# Patient Record
Sex: FEMALE | Race: BLACK OR AFRICAN AMERICAN | NOT HISPANIC OR LATINO | URBAN - METROPOLITAN AREA
[De-identification: names, ages, dates, MRNs, and addresses within clinical notes are randomized per-mention and may not be internally consistent; named-entity substitution may affect disease eponyms.]

---

## 2017-04-19 ENCOUNTER — EMERGENCY (EMERGENCY)
Facility: HOSPITAL | Age: 77
LOS: 1 days | Discharge: HOME | End: 2017-04-19
Attending: EMERGENCY MEDICINE | Admitting: EMERGENCY MEDICINE
Payer: MEDICARE

## 2017-04-19 VITALS
DIASTOLIC BLOOD PRESSURE: 89 MMHG | OXYGEN SATURATION: 99 % | TEMPERATURE: 97 F | HEART RATE: 101 BPM | SYSTOLIC BLOOD PRESSURE: 170 MMHG | RESPIRATION RATE: 18 BRPM

## 2017-04-19 DIAGNOSIS — K57.90 DIVERTICULOSIS OF INTESTINE, PART UNSPECIFIED, WITHOUT PERFORATION OR ABSCESS WITHOUT BLEEDING: ICD-10-CM

## 2017-04-19 DIAGNOSIS — K62.5 HEMORRHAGE OF ANUS AND RECTUM: ICD-10-CM

## 2017-04-19 PROCEDURE — 99284 EMERGENCY DEPT VISIT MOD MDM: CPT | Mod: GC

## 2017-04-19 NOTE — ED ADULT NURSE NOTE - OBJECTIVE STATEMENT
76 y.o female A&Ox4 ambulatory with cane presents to the ed c.o one day of LLQ pain with scant amount of blood with wiping at home. one episode of diarrhea, pt had taken a laxative two days prior. PMH HTN, hysterectomy 3 three years ago, with increased edema of b/l lower extremities and wounds to the bottom of her right second toe and left second toe, states she follows with primary physician. pt was taking tylenol at home for the pain, last time she took it was wednesday morning. Wednesday states she was getting out of her chair and fell to the ground, denies hitting her head / loc. patient takes a baby aspirin daily. abdomen is soft, no changes in pain when moving to the LLQ. denies chest pain, states she is short of breath at baseline and had RODGERS. denies fevers. daughter at the bedside, lives with the patient . Patient undressed and placed into gown, call bell in hand and side rails up for safety. warm blanket provided, vital signs stable, pt in no acute distress.

## 2017-04-20 VITALS
DIASTOLIC BLOOD PRESSURE: 80 MMHG | SYSTOLIC BLOOD PRESSURE: 158 MMHG | RESPIRATION RATE: 18 BRPM | HEART RATE: 90 BPM | OXYGEN SATURATION: 98 %

## 2017-04-20 LAB
ALBUMIN SERPL ELPH-MCNC: 4.5 G/DL — SIGNIFICANT CHANGE UP (ref 3.3–5)
ALP SERPL-CCNC: 57 U/L — SIGNIFICANT CHANGE UP (ref 40–120)
ALT FLD-CCNC: 17 U/L RC — SIGNIFICANT CHANGE UP (ref 10–45)
ANION GAP SERPL CALC-SCNC: 13 MMOL/L — SIGNIFICANT CHANGE UP (ref 5–17)
APPEARANCE UR: CLEAR — SIGNIFICANT CHANGE UP
APTT BLD: 28.6 SEC — SIGNIFICANT CHANGE UP (ref 27.5–37.4)
AST SERPL-CCNC: 27 U/L — SIGNIFICANT CHANGE UP (ref 10–40)
BASOPHILS # BLD AUTO: 0 K/UL — SIGNIFICANT CHANGE UP (ref 0–0.2)
BASOPHILS NFR BLD AUTO: 0.8 % — SIGNIFICANT CHANGE UP (ref 0–2)
BILIRUB SERPL-MCNC: 0.6 MG/DL — SIGNIFICANT CHANGE UP (ref 0.2–1.2)
BILIRUB UR-MCNC: NEGATIVE — SIGNIFICANT CHANGE UP
BLD GP AB SCN SERPL QL: NEGATIVE — SIGNIFICANT CHANGE UP
BUN SERPL-MCNC: 13 MG/DL — SIGNIFICANT CHANGE UP (ref 7–23)
CALCIUM SERPL-MCNC: 9.7 MG/DL — SIGNIFICANT CHANGE UP (ref 8.4–10.5)
CHLORIDE SERPL-SCNC: 104 MMOL/L — SIGNIFICANT CHANGE UP (ref 96–108)
CO2 SERPL-SCNC: 27 MMOL/L — SIGNIFICANT CHANGE UP (ref 22–31)
COLOR SPEC: YELLOW — SIGNIFICANT CHANGE UP
CREAT SERPL-MCNC: 0.78 MG/DL — SIGNIFICANT CHANGE UP (ref 0.5–1.3)
DIFF PNL FLD: NEGATIVE — SIGNIFICANT CHANGE UP
EOSINOPHIL # BLD AUTO: 0.2 K/UL — SIGNIFICANT CHANGE UP (ref 0–0.5)
EOSINOPHIL NFR BLD AUTO: 4.6 % — SIGNIFICANT CHANGE UP (ref 0–6)
GLUCOSE SERPL-MCNC: 97 MG/DL — SIGNIFICANT CHANGE UP (ref 70–99)
GLUCOSE UR QL: NEGATIVE — SIGNIFICANT CHANGE UP
HCT VFR BLD CALC: 37.5 % — SIGNIFICANT CHANGE UP (ref 34.5–45)
HGB BLD-MCNC: 12.9 G/DL — SIGNIFICANT CHANGE UP (ref 11.5–15.5)
INR BLD: 1.08 RATIO — SIGNIFICANT CHANGE UP (ref 0.88–1.16)
KETONES UR-MCNC: NEGATIVE — SIGNIFICANT CHANGE UP
LEUKOCYTE ESTERASE UR-ACNC: NEGATIVE — SIGNIFICANT CHANGE UP
LYMPHOCYTES # BLD AUTO: 1.6 K/UL — SIGNIFICANT CHANGE UP (ref 1–3.3)
LYMPHOCYTES # BLD AUTO: 33 % — SIGNIFICANT CHANGE UP (ref 13–44)
MCHC RBC-ENTMCNC: 27.6 PG — SIGNIFICANT CHANGE UP (ref 27–34)
MCHC RBC-ENTMCNC: 34.4 GM/DL — SIGNIFICANT CHANGE UP (ref 32–36)
MCV RBC AUTO: 80.2 FL — SIGNIFICANT CHANGE UP (ref 80–100)
MONOCYTES # BLD AUTO: 0.3 K/UL — SIGNIFICANT CHANGE UP (ref 0–0.9)
MONOCYTES NFR BLD AUTO: 6.4 % — SIGNIFICANT CHANGE UP (ref 2–14)
NEUTROPHILS # BLD AUTO: 2.7 K/UL — SIGNIFICANT CHANGE UP (ref 1.8–7.4)
NEUTROPHILS NFR BLD AUTO: 55.3 % — SIGNIFICANT CHANGE UP (ref 43–77)
NITRITE UR-MCNC: NEGATIVE — SIGNIFICANT CHANGE UP
PH UR: 5.5 — SIGNIFICANT CHANGE UP (ref 5–8)
PLATELET # BLD AUTO: 237 K/UL — SIGNIFICANT CHANGE UP (ref 150–400)
POTASSIUM SERPL-MCNC: 3.7 MMOL/L — SIGNIFICANT CHANGE UP (ref 3.5–5.3)
POTASSIUM SERPL-SCNC: 3.7 MMOL/L — SIGNIFICANT CHANGE UP (ref 3.5–5.3)
PROT SERPL-MCNC: 7.9 G/DL — SIGNIFICANT CHANGE UP (ref 6–8.3)
PROT UR-MCNC: SIGNIFICANT CHANGE UP
PROTHROM AB SERPL-ACNC: 11.7 SEC — SIGNIFICANT CHANGE UP (ref 9.8–12.7)
RBC # BLD: 4.68 M/UL — SIGNIFICANT CHANGE UP (ref 3.8–5.2)
RBC # FLD: 15.4 % — HIGH (ref 10.3–14.5)
RH IG SCN BLD-IMP: POSITIVE — SIGNIFICANT CHANGE UP
SODIUM SERPL-SCNC: 144 MMOL/L — SIGNIFICANT CHANGE UP (ref 135–145)
SP GR SPEC: 1.01 — SIGNIFICANT CHANGE UP (ref 1.01–1.02)
UROBILINOGEN FLD QL: NEGATIVE — SIGNIFICANT CHANGE UP
WBC # BLD: 4.9 K/UL — SIGNIFICANT CHANGE UP (ref 3.8–10.5)
WBC # FLD AUTO: 4.9 K/UL — SIGNIFICANT CHANGE UP (ref 3.8–10.5)

## 2017-04-20 PROCEDURE — 85610 PROTHROMBIN TIME: CPT

## 2017-04-20 PROCEDURE — 86901 BLOOD TYPING SEROLOGIC RH(D): CPT

## 2017-04-20 PROCEDURE — 86900 BLOOD TYPING SEROLOGIC ABO: CPT

## 2017-04-20 PROCEDURE — 82272 OCCULT BLD FECES 1-3 TESTS: CPT

## 2017-04-20 PROCEDURE — 81003 URINALYSIS AUTO W/O SCOPE: CPT

## 2017-04-20 PROCEDURE — 99284 EMERGENCY DEPT VISIT MOD MDM: CPT | Mod: 25

## 2017-04-20 PROCEDURE — 85027 COMPLETE CBC AUTOMATED: CPT

## 2017-04-20 PROCEDURE — 80053 COMPREHEN METABOLIC PANEL: CPT

## 2017-04-20 PROCEDURE — 74177 CT ABD & PELVIS W/CONTRAST: CPT | Mod: 26

## 2017-04-20 PROCEDURE — 74177 CT ABD & PELVIS W/CONTRAST: CPT

## 2017-04-20 PROCEDURE — 85730 THROMBOPLASTIN TIME PARTIAL: CPT

## 2017-04-20 PROCEDURE — 86850 RBC ANTIBODY SCREEN: CPT

## 2017-04-20 RX ORDER — SODIUM CHLORIDE 9 MG/ML
1000 INJECTION INTRAMUSCULAR; INTRAVENOUS; SUBCUTANEOUS
Qty: 0 | Refills: 0 | Status: DISCONTINUED | OUTPATIENT
Start: 2017-04-20 | End: 2017-04-23

## 2017-04-20 RX ORDER — SODIUM CHLORIDE 9 MG/ML
3 INJECTION INTRAMUSCULAR; INTRAVENOUS; SUBCUTANEOUS ONCE
Qty: 0 | Refills: 0 | Status: COMPLETED | OUTPATIENT
Start: 2017-04-20 | End: 2017-04-20

## 2017-04-20 RX ADMIN — SODIUM CHLORIDE 3 MILLILITER(S): 9 INJECTION INTRAMUSCULAR; INTRAVENOUS; SUBCUTANEOUS at 03:00

## 2017-04-20 NOTE — ED PROVIDER NOTE - MEDICAL DECISION MAKING DETAILS
77yo F with intermittent left lower quadrant pain over last week, worse in certain positions, no fever/chills. +diarrhea today, 1 episode of BRBPR on toilet tissue this am, no syncope/dizziness. no CP/SOB. colonoscopy many years ago. +mild pain with defecation. never had before. pain mild. now resolved.   ROS: no CP/SOB. no cough. no rash. no bleeding. no urinary complaints. no weakness. no vision changes. no HA. no neck/back pain. no extremity swelling.  Well appearing, VS wnl, mild ttp, will CT r/o diverticulitis, check H/H, sounds to be mild bleeding, could potentially DC

## 2017-04-20 NOTE — ED PROVIDER NOTE - PHYSICAL EXAMINATION
Gen: NAD, AOx3  Head: NCAT  HEENT: PERRL, oral mucosa moist, normal conjunctiva, neck supple  Lung: CTAB, no respiratory distress  CV: rrr, no murmur, Normal perfusion  Abd: soft, +LLQ ttp w/o rebound/guarding, ND  Rectal- no hemorrhoids/fissures, brown stool, +guaiac   MSK: b/l nonpitting edema, no visible deformities  Neuro: No focal neurologic deficits  Skin: No rash   Psych: normal affect

## 2017-04-20 NOTE — ED PROVIDER NOTE - PROGRESS NOTE DETAILS
PT doing well, H/H stable. CT scan shows diverticulosis with no itis. Will have pt follow up with GI as outpt. Pt no bloody BM's here.

## 2017-06-26 ENCOUNTER — APPOINTMENT (OUTPATIENT)
Dept: VASCULAR SURGERY | Facility: CLINIC | Age: 77
End: 2017-06-26

## 2017-06-30 ENCOUNTER — APPOINTMENT (OUTPATIENT)
Dept: VASCULAR SURGERY | Facility: CLINIC | Age: 77
End: 2017-06-30

## 2017-06-30 VITALS
HEART RATE: 98 BPM | TEMPERATURE: 98.3 F | WEIGHT: 170 LBS | DIASTOLIC BLOOD PRESSURE: 82 MMHG | HEIGHT: 59 IN | SYSTOLIC BLOOD PRESSURE: 143 MMHG | BODY MASS INDEX: 34.27 KG/M2

## 2017-07-17 ENCOUNTER — INPATIENT (INPATIENT)
Facility: HOSPITAL | Age: 77
LOS: 2 days | Discharge: HOME | End: 2017-07-20
Attending: INTERNAL MEDICINE | Admitting: INTERNAL MEDICINE
Payer: MEDICARE

## 2017-07-17 VITALS
OXYGEN SATURATION: 99 % | RESPIRATION RATE: 18 BRPM | DIASTOLIC BLOOD PRESSURE: 85 MMHG | SYSTOLIC BLOOD PRESSURE: 153 MMHG | TEMPERATURE: 97 F | HEART RATE: 98 BPM

## 2017-07-17 DIAGNOSIS — I48.92 UNSPECIFIED ATRIAL FLUTTER: ICD-10-CM

## 2017-07-17 DIAGNOSIS — I10 ESSENTIAL (PRIMARY) HYPERTENSION: ICD-10-CM

## 2017-07-17 DIAGNOSIS — Z29.9 ENCOUNTER FOR PROPHYLACTIC MEASURES, UNSPECIFIED: ICD-10-CM

## 2017-07-17 LAB
ALBUMIN SERPL ELPH-MCNC: 4.4 G/DL — SIGNIFICANT CHANGE UP (ref 3.3–5)
ALP SERPL-CCNC: 54 U/L — SIGNIFICANT CHANGE UP (ref 40–120)
ALT FLD-CCNC: 9 U/L — SIGNIFICANT CHANGE UP (ref 4–33)
APTT BLD: 28.6 SEC — SIGNIFICANT CHANGE UP (ref 27.5–37.4)
AST SERPL-CCNC: 18 U/L — SIGNIFICANT CHANGE UP (ref 4–32)
BASOPHILS # BLD AUTO: 0.04 K/UL — SIGNIFICANT CHANGE UP (ref 0–0.2)
BASOPHILS NFR BLD AUTO: 0.7 % — SIGNIFICANT CHANGE UP (ref 0–2)
BILIRUB SERPL-MCNC: 0.3 MG/DL — SIGNIFICANT CHANGE UP (ref 0.2–1.2)
BUN SERPL-MCNC: 17 MG/DL — SIGNIFICANT CHANGE UP (ref 7–23)
CALCIUM SERPL-MCNC: 9.9 MG/DL — SIGNIFICANT CHANGE UP (ref 8.4–10.5)
CHLORIDE SERPL-SCNC: 103 MMOL/L — SIGNIFICANT CHANGE UP (ref 98–107)
CK SERPL-CCNC: 202 U/L — HIGH (ref 25–170)
CO2 SERPL-SCNC: 29 MMOL/L — SIGNIFICANT CHANGE UP (ref 22–31)
CREAT SERPL-MCNC: 0.92 MG/DL — SIGNIFICANT CHANGE UP (ref 0.5–1.3)
EOSINOPHIL # BLD AUTO: 0.2 K/UL — SIGNIFICANT CHANGE UP (ref 0–0.5)
EOSINOPHIL NFR BLD AUTO: 3.4 % — SIGNIFICANT CHANGE UP (ref 0–6)
GLUCOSE SERPL-MCNC: 94 MG/DL — SIGNIFICANT CHANGE UP (ref 70–99)
HCT VFR BLD CALC: 36.6 % — SIGNIFICANT CHANGE UP (ref 34.5–45)
HGB BLD-MCNC: 12.5 G/DL — SIGNIFICANT CHANGE UP (ref 11.5–15.5)
IMM GRANULOCYTES # BLD AUTO: 0.01 # — SIGNIFICANT CHANGE UP
IMM GRANULOCYTES NFR BLD AUTO: 0.2 % — SIGNIFICANT CHANGE UP (ref 0–1.5)
INR BLD: 1 — SIGNIFICANT CHANGE UP (ref 0.88–1.17)
LYMPHOCYTES # BLD AUTO: 1.79 K/UL — SIGNIFICANT CHANGE UP (ref 1–3.3)
LYMPHOCYTES # BLD AUTO: 30.9 % — SIGNIFICANT CHANGE UP (ref 13–44)
MCHC RBC-ENTMCNC: 25.8 PG — LOW (ref 27–34)
MCHC RBC-ENTMCNC: 34.2 % — SIGNIFICANT CHANGE UP (ref 32–36)
MCV RBC AUTO: 75.6 FL — LOW (ref 80–100)
MONOCYTES # BLD AUTO: 0.39 K/UL — SIGNIFICANT CHANGE UP (ref 0–0.9)
MONOCYTES NFR BLD AUTO: 6.7 % — SIGNIFICANT CHANGE UP (ref 2–14)
NEUTROPHILS # BLD AUTO: 3.37 K/UL — SIGNIFICANT CHANGE UP (ref 1.8–7.4)
NEUTROPHILS NFR BLD AUTO: 58.1 % — SIGNIFICANT CHANGE UP (ref 43–77)
NRBC # FLD: 0 — SIGNIFICANT CHANGE UP
PLATELET # BLD AUTO: 316 K/UL — SIGNIFICANT CHANGE UP (ref 150–400)
PMV BLD: 10.4 FL — SIGNIFICANT CHANGE UP (ref 7–13)
POTASSIUM SERPL-MCNC: 4.3 MMOL/L — SIGNIFICANT CHANGE UP (ref 3.5–5.3)
POTASSIUM SERPL-SCNC: 4.3 MMOL/L — SIGNIFICANT CHANGE UP (ref 3.5–5.3)
PROT SERPL-MCNC: 7.7 G/DL — SIGNIFICANT CHANGE UP (ref 6–8.3)
PROTHROM AB SERPL-ACNC: 11.2 SEC — SIGNIFICANT CHANGE UP (ref 9.8–13.1)
RBC # BLD: 4.84 M/UL — SIGNIFICANT CHANGE UP (ref 3.8–5.2)
RBC # FLD: 15.8 % — HIGH (ref 10.3–14.5)
SODIUM SERPL-SCNC: 144 MMOL/L — SIGNIFICANT CHANGE UP (ref 135–145)
TROPONIN T SERPL-MCNC: < 0.06 NG/ML — SIGNIFICANT CHANGE UP (ref 0–0.06)
TSH SERPL-MCNC: 2.13 UIU/ML — SIGNIFICANT CHANGE UP (ref 0.27–4.2)
WBC # BLD: 5.8 K/UL — SIGNIFICANT CHANGE UP (ref 3.8–10.5)
WBC # FLD AUTO: 5.8 K/UL — SIGNIFICANT CHANGE UP (ref 3.8–10.5)

## 2017-07-17 PROCEDURE — 71010: CPT | Mod: 26

## 2017-07-17 RX ORDER — FUROSEMIDE 40 MG
20 TABLET ORAL DAILY
Qty: 0 | Refills: 0 | Status: DISCONTINUED | OUTPATIENT
Start: 2017-07-17 | End: 2017-07-20

## 2017-07-17 RX ORDER — ACETAMINOPHEN 500 MG
650 TABLET ORAL ONCE
Qty: 0 | Refills: 0 | Status: COMPLETED | OUTPATIENT
Start: 2017-07-17 | End: 2017-07-17

## 2017-07-17 RX ORDER — HEPARIN SODIUM 5000 [USP'U]/ML
6000 INJECTION INTRAVENOUS; SUBCUTANEOUS EVERY 6 HOURS
Qty: 0 | Refills: 0 | Status: DISCONTINUED | OUTPATIENT
Start: 2017-07-17 | End: 2017-07-19

## 2017-07-17 RX ORDER — HEPARIN SODIUM 5000 [USP'U]/ML
INJECTION INTRAVENOUS; SUBCUTANEOUS
Qty: 25000 | Refills: 0 | Status: DISCONTINUED | OUTPATIENT
Start: 2017-07-17 | End: 2017-07-19

## 2017-07-17 RX ORDER — HEPARIN SODIUM 5000 [USP'U]/ML
3000 INJECTION INTRAVENOUS; SUBCUTANEOUS EVERY 6 HOURS
Qty: 0 | Refills: 0 | Status: DISCONTINUED | OUTPATIENT
Start: 2017-07-17 | End: 2017-07-19

## 2017-07-17 RX ORDER — POTASSIUM CHLORIDE 20 MEQ
0 PACKET (EA) ORAL
Qty: 0 | Refills: 0 | COMMUNITY

## 2017-07-17 RX ADMIN — Medication 650 MILLIGRAM(S): at 22:08

## 2017-07-17 RX ADMIN — HEPARIN SODIUM 1300 UNIT(S)/HR: 5000 INJECTION INTRAVENOUS; SUBCUTANEOUS at 23:06

## 2017-07-17 RX ADMIN — Medication 650 MILLIGRAM(S): at 21:05

## 2017-07-17 NOTE — ED PROVIDER NOTE - MEDICAL DECISION MAKING DETAILS
pt sent here for admission for atrial flutter  Pt withsubscute RODGERS  chronic LE edema  EKG with flutter  rate controlled - prior EKG here with flutter  similar rate  no ECHO  no eval for AC apparent   will discuss with cardiology pt sent here for admission for atrial flutter  Pt with subscute RODGERS  chronic LE edema  EKG with flutter  rate controlled - prior EKG here with flutter  similar rate  no ECHO  no eval for AC apparent   will discuss with cardiology

## 2017-07-17 NOTE — ED ADULT NURSE REASSESSMENT NOTE - NS ED NURSE REASSESS COMMENT FT1
pt. asleep but easily arousable, in NAD, breathes with ease, denies any pain at this time. pt. admitted, verbal report given to LUIS Clemente in RADS1.

## 2017-07-17 NOTE — ED ADULT TRIAGE NOTE - CHIEF COMPLAINT QUOTE
arrives from pmd office for eval of new onset a flutter.  Pt c/o sob for the past few weeks.  pt denies chest pain.

## 2017-07-17 NOTE — H&P ADULT - NSHPREVIEWOFSYSTEMS_GEN_ALL_CORE
Constitutional: No fever, fatigue or weight loss.  Skin: No rash.  Eyes: No recent vision problems or eye pain.  ENT: No congestion, ear pain, or sore throat.  Endocrine: No thyroid problems.  Cardiovascular: No chest pain or palpitation.  Respiratory: + shortness of breath. No cough, congestion, or wheezing.  Gastrointestinal: No abdominal pain, nausea, vomiting, or diarrhea.  Genitourinary: No dysuria.  Musculoskeletal: No joint swelling.  Neurologic: No headache.

## 2017-07-17 NOTE — ED ADULT NURSE REASSESSMENT NOTE - NS ED NURSE REASSESS COMMENT FT1
rec'd pt. a&ox4, noted a-flutter on cardiac monitor, with g20 saline lock on left ac with no ss of infiltration. denies any chest pain nor SOB at this time. c/o pain on neck at this time. MD made aware. pillow provided. will continue to monitor

## 2017-07-17 NOTE — ED ADULT NURSE NOTE - OBJECTIVE STATEMENT
pt to rm21. alert,oriented x3. skin warm,dry. denies ch pains. reports increased diff breathing x 3 days. eval by dr friedman.  sent from [facility] md for eval of a flutter. will continue to monitor. float rn

## 2017-07-17 NOTE — ED PROVIDER NOTE - OBJECTIVE STATEMENT
Pt sent in for admission for atrial flutter  Pt has chronic exertional dypsnes  b/l LE edema  no reprted cough  chest pain or fever  denies diarrhea or vomiting  denies abd pain  Saw outpt MD who referred her to Dr Suárez who saw her today and sent her to the ED

## 2017-07-17 NOTE — H&P ADULT - NSHPLABSRESULTS_GEN_ALL_CORE
LABS:                        12.5   5.80  )-----------( 316      ( 17 Jul 2017 18:25 )             36.6     07-17    144  |  103  |  17  ----------------------------<  94  4.3   |  29  |  0.92    Ca    9.9      17 Jul 2017 18:25    TPro  7.7  /  Alb  4.4  /  TBili  0.3  /  DBili  x   /  AST  18  /  ALT  9   /  AlkPhos  54  07-17    PT/INR - ( 17 Jul 2017 18:25 )   PT: 11.2 SEC;   INR: 1.00          PTT - ( 17 Jul 2017 18:25 )  PTT:28.6 SEC    CAPILLARY BLOOD GLUCOSE    CARDIAC MARKERS ( 17 Jul 2017 18:25 )  x     / < 0.06 ng/mL / 202 u/L / x     / x

## 2017-07-17 NOTE — H&P ADULT - PROBLEM SELECTOR PLAN 1
Admit to telemetry. Rate is currently controlled.   [id number] score of 4. Start heparin drip with no bolus, full anticoagulation.   TTE ordered. Venous doppler ordered.   check cbc,tsh,lipid, hemoglobin a1c, bmp with mag and phos.   f/u MD note

## 2017-07-17 NOTE — H&P ADULT - PSH
angiogram of RLE to R/O DVT-  ~ 3-4 years ago- Negative test  angiogram of RLE to R/O DVT-  ~ 3-4 years ago- Negative test  S/P dilatation and curettage  in 1994

## 2017-07-17 NOTE — H&P ADULT - HISTORY OF PRESENT ILLNESS
77 y/o F with h/o HTN presents to the ED for atrial flutter. Pt states she saw her out patient doctor who referred her to Dr. Suárez for her atrial flutter. Pt was sent her to be admitted by Dr. Sow. Pt states she has shortness of breath when she walks a few blocks. Pt also states she has lower extremity edema for the past few weeks. Pt denies LOC, syncope, fever, chills, chest pain, palpitations, numbness, tingling, fever, chills, dysuria, urinary/bowel incontinence or any other complaints at this time.

## 2017-07-17 NOTE — ED PROVIDER NOTE - PROGRESS NOTE DETAILS
Meet  called service x2  never got through (on phone for 10 min) also called page number no response  sent in for adm  discussed case with cards  PA

## 2017-07-17 NOTE — H&P ADULT - NSHPPHYSICALEXAM_GEN_ALL_CORE
GENERAL APPEARANCE: Well developed, well nourished, alert and cooperative, and appears to be in no acute distress.  HEAD: normocephalic.  EYES: PERRL, EOMI.   EARS: External auditory canals clear, hearing grossly intact.  NECK: Neck supple, non-tender without lymphadenopathy, masses or thyromegaly.  CARDIAC: Normal S1 and S2. No S3, S4 or murmurs. Rhythm is regular.   LUNGS: Clear to auscultation and percussion without rales, rhonchi, wheezing or diminished breath sounds.  ABDOMEN: Positive bowel sounds. Soft, nondistended, nontender. No guarding or rebound. No masses.  EXTREMITIES: No significant deformity or joint abnormality. 2+ edema. Peripheral pulses intact.   NEUROLOGICAL: Strength and sensation symmetric and intact throughout.   SKIN: Skin normal color, texture and turgor with no lesions or eruptions.  PSYCHIATRIC: The mental examination revealed the patient was oriented to person, place, and time. The patient was able to demonstrate good judgement and reason, without hallucinations, abnormal affect or abnormal behaviors during the examination. Patient is not suicidal.

## 2017-07-18 LAB
APTT BLD: 121.8 SEC — CRITICAL HIGH (ref 27.5–37.4)
APTT BLD: 186.7 SEC — CRITICAL HIGH (ref 27.5–37.4)
APTT BLD: > 200 SEC — CRITICAL HIGH (ref 27.5–37.4)
BUN SERPL-MCNC: 13 MG/DL — SIGNIFICANT CHANGE UP (ref 7–23)
CALCIUM SERPL-MCNC: 9.3 MG/DL — SIGNIFICANT CHANGE UP (ref 8.4–10.5)
CHLORIDE SERPL-SCNC: 106 MMOL/L — SIGNIFICANT CHANGE UP (ref 98–107)
CHOLEST SERPL-MCNC: 174 MG/DL — SIGNIFICANT CHANGE UP (ref 120–199)
CK MB BLD-MCNC: 3.11 NG/ML — SIGNIFICANT CHANGE UP (ref 1–4.7)
CK SERPL-CCNC: 149 U/L — SIGNIFICANT CHANGE UP (ref 25–170)
CO2 SERPL-SCNC: 27 MMOL/L — SIGNIFICANT CHANGE UP (ref 22–31)
CREAT SERPL-MCNC: 0.74 MG/DL — SIGNIFICANT CHANGE UP (ref 0.5–1.3)
GLUCOSE SERPL-MCNC: 93 MG/DL — SIGNIFICANT CHANGE UP (ref 70–99)
HBA1C BLD-MCNC: 5.6 % — SIGNIFICANT CHANGE UP (ref 4–5.6)
HCT VFR BLD CALC: 33.4 % — LOW (ref 34.5–45)
HDLC SERPL-MCNC: 68 MG/DL — HIGH (ref 45–65)
HGB BLD-MCNC: 11.6 G/DL — SIGNIFICANT CHANGE UP (ref 11.5–15.5)
LIPID PNL WITH DIRECT LDL SERPL: 105 MG/DL — SIGNIFICANT CHANGE UP
MAGNESIUM SERPL-MCNC: 2.2 MG/DL — SIGNIFICANT CHANGE UP (ref 1.6–2.6)
MCHC RBC-ENTMCNC: 26.7 PG — LOW (ref 27–34)
MCHC RBC-ENTMCNC: 34.7 % — SIGNIFICANT CHANGE UP (ref 32–36)
MCV RBC AUTO: 76.8 FL — LOW (ref 80–100)
NRBC # FLD: 0 — SIGNIFICANT CHANGE UP
PHOSPHATE SERPL-MCNC: 3.8 MG/DL — SIGNIFICANT CHANGE UP (ref 2.5–4.5)
PLATELET # BLD AUTO: 268 K/UL — SIGNIFICANT CHANGE UP (ref 150–400)
PMV BLD: 10 FL — SIGNIFICANT CHANGE UP (ref 7–13)
POTASSIUM SERPL-MCNC: 4.2 MMOL/L — SIGNIFICANT CHANGE UP (ref 3.5–5.3)
POTASSIUM SERPL-SCNC: 4.2 MMOL/L — SIGNIFICANT CHANGE UP (ref 3.5–5.3)
RBC # BLD: 4.35 M/UL — SIGNIFICANT CHANGE UP (ref 3.8–5.2)
RBC # FLD: 15.2 % — HIGH (ref 10.3–14.5)
SODIUM SERPL-SCNC: 145 MMOL/L — SIGNIFICANT CHANGE UP (ref 135–145)
TRIGL SERPL-MCNC: 40 MG/DL — SIGNIFICANT CHANGE UP (ref 10–149)
TROPONIN T SERPL-MCNC: < 0.06 NG/ML — SIGNIFICANT CHANGE UP (ref 0–0.06)
WBC # BLD: 4.87 K/UL — SIGNIFICANT CHANGE UP (ref 3.8–10.5)
WBC # FLD AUTO: 4.87 K/UL — SIGNIFICANT CHANGE UP (ref 3.8–10.5)

## 2017-07-18 RX ORDER — METOPROLOL TARTRATE 50 MG
25 TABLET ORAL DAILY
Qty: 0 | Refills: 0 | Status: DISCONTINUED | OUTPATIENT
Start: 2017-07-18 | End: 2017-07-18

## 2017-07-18 RX ORDER — METOPROLOL TARTRATE 50 MG
25 TABLET ORAL DAILY
Qty: 0 | Refills: 0 | Status: DISCONTINUED | OUTPATIENT
Start: 2017-07-18 | End: 2017-07-20

## 2017-07-18 RX ADMIN — HEPARIN SODIUM 0 UNIT(S)/HR: 5000 INJECTION INTRAVENOUS; SUBCUTANEOUS at 13:34

## 2017-07-18 RX ADMIN — Medication 20 MILLIGRAM(S): at 05:12

## 2017-07-18 RX ADMIN — HEPARIN SODIUM 1100 UNIT(S)/HR: 5000 INJECTION INTRAVENOUS; SUBCUTANEOUS at 08:00

## 2017-07-18 RX ADMIN — HEPARIN SODIUM 900 UNIT(S)/HR: 5000 INJECTION INTRAVENOUS; SUBCUTANEOUS at 14:39

## 2017-07-18 RX ADMIN — HEPARIN SODIUM 700 UNIT(S)/HR: 5000 INJECTION INTRAVENOUS; SUBCUTANEOUS at 21:16

## 2017-07-18 RX ADMIN — HEPARIN SODIUM 0 UNIT(S)/HR: 5000 INJECTION INTRAVENOUS; SUBCUTANEOUS at 07:10

## 2017-07-18 NOTE — CONSULT NOTE ADULT - SUBJECTIVE AND OBJECTIVE BOX
EP ATTENDING      HISTORY OF PRESENT ILLNESS: She is a pleasant 75 y/o female who was diagnosed with atypical atrial flutter (likely mitral flutter) in April 2017. She was not seen by a cardiologist nor was A/C initiated. She is now admitted with dyspnea and lower ext edema, and EP is consulted for further recommendations. Echo pending, TSH normal. CHADS-VASC 4. She denies palpitations, angina or syncope.      PAST MEDICAL & SURGICAL HISTORY:  HTN (hypertension)  S/P dilatation and curettage: in 1994  atypical atrial flutter    MEDICATIONS  (STANDING):  furosemide    Tablet 20 milliGRAM(s) Oral daily  heparin  Infusion.  Unit(s)/Hr (13 mL/Hr) IV Continuous <Continuous>    No Known Allergies    FAMILY HISTORY:  No pertinent family history in first degree relatives    Non-contributary for premature coronary disease or sudden cardiac death    SOCIAL HISTORY:    [x ] Non-smoker  [ ] Smoker  [ ] Alcohol      REVIEW OF SYSTEMS:  [ ]chest pain  [x  ]shortness of breath  [  ]palpitations  [  ]syncope  [ ]near syncope [ ]upper extremity weakness   [ ] lower extremity weakness  [  ]diplopia  [  ]altered mental status   [  ]fevers  [ ]chills [ ]nausea  [ ]vomitting  [  ]dysphagia    [ ]abdominal pain  [ ]melena  [ ]BRBPR    [  ]epistaxis  [  ]rash    [x ]lower extremity edema        [x ] All others negative	  [ ] Unable to obtain    PHYSICAL EXAM:  T(C): 36.4 (07-18-17 @ 05:12), Max: 36.4 (07-18-17 @ 05:12)  HR: 69 (07-18-17 @ 05:12) (69 - 98)  BP: 119/46 (07-18-17 @ 05:12) (119/46 - 163/84)  RR: 14 (07-18-17 @ 05:12) (14 - 18)  SpO2: 100% (07-18-17 @ 05:12) (98% - 100%)  Wt(kg): --    no JVD  IRR, no murmurs  CTAB  soft nt/nd  no c/c/e    TELEMETRY: atypical AFL  ECG: atypical AFL    Echo: pending  NST: n/a  Cath: n/a  	  	  LABS:	 	                          11.6   4.87  )-----------( 268      ( 18 Jul 2017 05:00 )             33.4     07-18    145  |  106  |  13  ----------------------------<  93  4.2   |  27  |  0.74    Ca    9.3      18 Jul 2017 05:00  Phos  3.8     07-18  Mg     2.2     07-18    TPro  7.7  /  Alb  4.4  /  TBili  0.3  /  DBili  x   /  AST  18  /  ALT  9   /  AlkPhos  54  07-17    proBNP:   Lipid Profile:   HgA1c: Hemoglobin A1C, Whole Blood: 5.6 % (07-18 @ 05:00)    TSH: Thyroid Stimulating Hormone, Serum: 2.13 uIU/mL (07-17 @ 18:25)      ASSESSMENT/PLAN: She is a pleasant 75 y/o female who was diagnosed with atypical atrial flutter (likely mitral flutter) in April 2017. She was not seen by a cardiologist nor was A/C initiated. She is now admitted with dyspnea and lower ext edema, and EP is consulted for further recommendations. Echo pending, TSH normal. CHADS-VASC 4. She denies palpitations, angina or syncope.    -agree with heparin. Consider change to lovenox to avoid frequent blood draws. Recommend lifelong A/C for CVA prevention  -start toprol xl 25mg daily  -get echo  -final EP reccs pending echo    Seferino Bustos M.D., [facility]  [phone]

## 2017-07-19 ENCOUNTER — TRANSCRIPTION ENCOUNTER (OUTPATIENT)
Age: 77
End: 2017-07-19

## 2017-07-19 LAB
APTT BLD: 67.6 SEC — HIGH (ref 27.5–37.4)
APTT BLD: 76.2 SEC — HIGH (ref 27.5–37.4)
APTT BLD: 79.5 SEC — HIGH (ref 27.5–37.4)
BASOPHILS # BLD AUTO: 0.02 K/UL — SIGNIFICANT CHANGE UP (ref 0–0.2)
BASOPHILS NFR BLD AUTO: 0.4 % — SIGNIFICANT CHANGE UP (ref 0–2)
BUN SERPL-MCNC: 12 MG/DL — SIGNIFICANT CHANGE UP (ref 7–23)
CALCIUM SERPL-MCNC: 9.1 MG/DL — SIGNIFICANT CHANGE UP (ref 8.4–10.5)
CHLORIDE SERPL-SCNC: 105 MMOL/L — SIGNIFICANT CHANGE UP (ref 98–107)
CO2 SERPL-SCNC: 26 MMOL/L — SIGNIFICANT CHANGE UP (ref 22–31)
CREAT SERPL-MCNC: 0.74 MG/DL — SIGNIFICANT CHANGE UP (ref 0.5–1.3)
EOSINOPHIL # BLD AUTO: 0.21 K/UL — SIGNIFICANT CHANGE UP (ref 0–0.5)
EOSINOPHIL NFR BLD AUTO: 4.1 % — SIGNIFICANT CHANGE UP (ref 0–6)
GLUCOSE SERPL-MCNC: 82 MG/DL — SIGNIFICANT CHANGE UP (ref 70–99)
HCT VFR BLD CALC: 34.6 % — SIGNIFICANT CHANGE UP (ref 34.5–45)
HCT VFR BLD CALC: 34.6 % — SIGNIFICANT CHANGE UP (ref 34.5–45)
HGB BLD-MCNC: 11.8 G/DL — SIGNIFICANT CHANGE UP (ref 11.5–15.5)
HGB BLD-MCNC: 11.8 G/DL — SIGNIFICANT CHANGE UP (ref 11.5–15.5)
IMM GRANULOCYTES # BLD AUTO: 0.02 # — SIGNIFICANT CHANGE UP
IMM GRANULOCYTES NFR BLD AUTO: 0.4 % — SIGNIFICANT CHANGE UP (ref 0–1.5)
INR BLD: 1.03 — SIGNIFICANT CHANGE UP (ref 0.88–1.17)
LYMPHOCYTES # BLD AUTO: 1.85 K/UL — SIGNIFICANT CHANGE UP (ref 1–3.3)
LYMPHOCYTES # BLD AUTO: 35.7 % — SIGNIFICANT CHANGE UP (ref 13–44)
MAGNESIUM SERPL-MCNC: 2.1 MG/DL — SIGNIFICANT CHANGE UP (ref 1.6–2.6)
MCHC RBC-ENTMCNC: 25.8 PG — LOW (ref 27–34)
MCHC RBC-ENTMCNC: 25.8 PG — LOW (ref 27–34)
MCHC RBC-ENTMCNC: 34.1 % — SIGNIFICANT CHANGE UP (ref 32–36)
MCHC RBC-ENTMCNC: 34.1 % — SIGNIFICANT CHANGE UP (ref 32–36)
MCV RBC AUTO: 75.5 FL — LOW (ref 80–100)
MCV RBC AUTO: 75.5 FL — LOW (ref 80–100)
MONOCYTES # BLD AUTO: 0.35 K/UL — SIGNIFICANT CHANGE UP (ref 0–0.9)
MONOCYTES NFR BLD AUTO: 6.8 % — SIGNIFICANT CHANGE UP (ref 2–14)
NEUTROPHILS # BLD AUTO: 2.73 K/UL — SIGNIFICANT CHANGE UP (ref 1.8–7.4)
NEUTROPHILS NFR BLD AUTO: 52.6 % — SIGNIFICANT CHANGE UP (ref 43–77)
NRBC # FLD: 0 — SIGNIFICANT CHANGE UP
NRBC # FLD: 0 — SIGNIFICANT CHANGE UP
PLATELET # BLD AUTO: 297 K/UL — SIGNIFICANT CHANGE UP (ref 150–400)
PLATELET # BLD AUTO: 297 K/UL — SIGNIFICANT CHANGE UP (ref 150–400)
PMV BLD: 11.1 FL — SIGNIFICANT CHANGE UP (ref 7–13)
PMV BLD: 11.1 FL — SIGNIFICANT CHANGE UP (ref 7–13)
POTASSIUM SERPL-MCNC: 4 MMOL/L — SIGNIFICANT CHANGE UP (ref 3.5–5.3)
POTASSIUM SERPL-SCNC: 4 MMOL/L — SIGNIFICANT CHANGE UP (ref 3.5–5.3)
PROTHROM AB SERPL-ACNC: 11.6 SEC — SIGNIFICANT CHANGE UP (ref 9.8–13.1)
RBC # BLD: 4.58 M/UL — SIGNIFICANT CHANGE UP (ref 3.8–5.2)
RBC # BLD: 4.58 M/UL — SIGNIFICANT CHANGE UP (ref 3.8–5.2)
RBC # FLD: 15.9 % — HIGH (ref 10.3–14.5)
RBC # FLD: 15.9 % — HIGH (ref 10.3–14.5)
SODIUM SERPL-SCNC: 143 MMOL/L — SIGNIFICANT CHANGE UP (ref 135–145)
WBC # BLD: 5.18 K/UL — SIGNIFICANT CHANGE UP (ref 3.8–10.5)
WBC # BLD: 5.18 K/UL — SIGNIFICANT CHANGE UP (ref 3.8–10.5)
WBC # FLD AUTO: 5.18 K/UL — SIGNIFICANT CHANGE UP (ref 3.8–10.5)
WBC # FLD AUTO: 5.18 K/UL — SIGNIFICANT CHANGE UP (ref 3.8–10.5)

## 2017-07-19 PROCEDURE — 93970 EXTREMITY STUDY: CPT | Mod: 26

## 2017-07-19 PROCEDURE — 93306 TTE W/DOPPLER COMPLETE: CPT | Mod: 26

## 2017-07-19 RX ORDER — SENNA PLUS 8.6 MG/1
2 TABLET ORAL AT BEDTIME
Qty: 0 | Refills: 0 | Status: DISCONTINUED | OUTPATIENT
Start: 2017-07-19 | End: 2017-07-19

## 2017-07-19 RX ORDER — POLYETHYLENE GLYCOL 3350 17 G/17G
17 POWDER, FOR SOLUTION ORAL ONCE
Qty: 0 | Refills: 0 | Status: DISCONTINUED | OUTPATIENT
Start: 2017-07-19 | End: 2017-07-19

## 2017-07-19 RX ORDER — ENOXAPARIN SODIUM 100 MG/ML
75 INJECTION SUBCUTANEOUS EVERY 12 HOURS
Qty: 0 | Refills: 0 | Status: DISCONTINUED | OUTPATIENT
Start: 2017-07-19 | End: 2017-07-19

## 2017-07-19 RX ORDER — METOPROLOL TARTRATE 50 MG
1 TABLET ORAL
Qty: 0 | Refills: 0 | COMMUNITY
Start: 2017-07-19

## 2017-07-19 RX ORDER — RIVAROXABAN 15 MG-20MG
20 KIT ORAL EVERY 24 HOURS
Qty: 0 | Refills: 0 | Status: DISCONTINUED | OUTPATIENT
Start: 2017-07-19 | End: 2017-07-20

## 2017-07-19 RX ORDER — DOCUSATE SODIUM 100 MG
100 CAPSULE ORAL THREE TIMES A DAY
Qty: 0 | Refills: 0 | Status: DISCONTINUED | OUTPATIENT
Start: 2017-07-19 | End: 2017-07-19

## 2017-07-19 RX ADMIN — RIVAROXABAN 20 MILLIGRAM(S): KIT at 21:45

## 2017-07-19 RX ADMIN — Medication 100 MILLIGRAM(S): at 12:11

## 2017-07-19 RX ADMIN — HEPARIN SODIUM 700 UNIT(S)/HR: 5000 INJECTION INTRAVENOUS; SUBCUTANEOUS at 04:14

## 2017-07-19 RX ADMIN — Medication 20 MILLIGRAM(S): at 05:13

## 2017-07-19 RX ADMIN — ENOXAPARIN SODIUM 75 MILLIGRAM(S): 100 INJECTION SUBCUTANEOUS at 12:10

## 2017-07-19 RX ADMIN — Medication 25 MILLIGRAM(S): at 05:13

## 2017-07-19 NOTE — DISCHARGE NOTE ADULT - HOSPITAL COURSE
HPI:  75 y/o F with h/o HTN presents to the ED for atrial flutter. Pt states she saw her out patient doctor who referred her to Dr. Suárez for her atrial flutter. Pt was sent her to be admitted by Dr. Sow. Pt states she has shortness of breath when she walks a few blocks. Pt also states she has lower extremity edema for the past few weeks. Pt denies LOC, syncope, fever, chills, chest pain, palpitations, numbness, tingling, fever, chills, dysuria, urinary/bowel incontinence or any other complaints at this time. (17 Jul 2017 21:55)    On admission:  CEX2: negative   EKG: atrial flutter @ 82 bpm   7/19 LE dopplers: Incomplete evaluation of the calf veins. No evidence of deep vein thrombosis in the visualized veins of both lower extremities.  7/19 TTE: EF 66% 1. Mitral annular calcification, otherwise normal mitral valve. Mild mitral regurgitation. 2. Mildly dilated left atrium.  LA volume index = 35 cc/m2. 3. Normal left ventricular internal dimensions and wall thicknesses. 4. Normal left ventricular systolic function. No segmental wall motion abnormalities. 5. Normal right ventricular size and function. 6. Estimated right ventricular systolic pressure equals 42 mm Hg, assuming right atrial pressure equals 10 mm Hg, consistent with mild pulmonary hypertension.    Patient evaluated by electrophysiology. Found to be in aflutter.  Started on heparin gtt.  Started on toprol xl 25mg po qd and lasix 20mg po qd. Recommended lifelong A/C for CVA prevention.  Since echo normal, will opt for rate control strategy as patient is largely asymptomatic.     INCOMPLETE HPI:  75 y/o F with h/o HTN presents to the ED for atrial flutter. Pt states she saw her out patient doctor who referred her to Dr. Suárez for her atrial flutter. Pt was sent her to be admitted by Dr. Sow. Pt states she has shortness of breath when she walks a few blocks. Pt also states she has lower extremity edema for the past few weeks. Pt denies LOC, syncope, fever, chills, chest pain, palpitations, numbness, tingling, fever, chills, dysuria, urinary/bowel incontinence or any other complaints at this time. (17 Jul 2017 21:55)    On admission:  CEX2: negative   EKG: atrial flutter @ 82 bpm   7/19 LE dopplers: Incomplete evaluation of the calf veins. No evidence of deep vein thrombosis in the visualized veins of both lower extremities.  7/19 TTE: EF 66% 1. Mitral annular calcification, otherwise normal mitral valve. Mild mitral regurgitation. 2. Mildly dilated left atrium.  LA volume index = 35 cc/m2. 3. Normal left ventricular internal dimensions and wall thicknesses. 4. Normal left ventricular systolic function. No segmental wall motion abnormalities. 5. Normal right ventricular size and function. 6. Estimated right ventricular systolic pressure equals 42 mm Hg, assuming right atrial pressure equals 10 mm Hg, consistent with mild pulmonary hypertension.    Patient evaluated by electrophysiology. Found to be in aflutter.  Started on heparin gtt.  Started on toprol xl 25mg po qd and lasix 20mg po qd. Recommended lifelong A/C for CVA prevention.  Since echo normal, will opt for rate control strategy as patient is largely asymptomatic. HPI:  75 y/o F with h/o HTN presents to the ED for atrial flutter. Pt states she saw her out patient doctor who referred her to Dr. Suárez for her atrial flutter. Pt was sent her to be admitted by Dr. Sow. Pt states she has shortness of breath when she walks a few blocks. Pt also states she has lower extremity edema for the past few weeks. Pt denies LOC, syncope, fever, chills, chest pain, palpitations, numbness, tingling, fever, chills, dysuria, urinary/bowel incontinence or any other complaints at this time. (17 Jul 2017 21:55)    On admission:  CEX2: negative   EKG: atrial flutter @ 82 bpm   7/19 LE dopplers: Incomplete evaluation of the calf veins. No evidence of deep vein thrombosis in the visualized veins of both lower extremities.  7/19 TTE: EF 66% 1. Mitral annular calcification, otherwise normal mitral valve. Mild mitral regurgitation. 2. Mildly dilated left atrium.  LA volume index = 35 cc/m2. 3. Normal left ventricular internal dimensions and wall thicknesses. 4. Normal left ventricular systolic function. No segmental wall motion abnormalities. 5. Normal right ventricular size and function. 6. Estimated right ventricular systolic pressure equals 42 mm Hg, assuming right atrial pressure equals 10 mm Hg, consistent with mild pulmonary hypertension.    Patient evaluated by electrophysiology. Found to be in aflutter.  Started on heparin gtt.  Started on toprol xl 25mg po qd and lasix 20mg po qd. Recommended lifelong A/C for CVA prevention.  Since echo normal, will opt for rate control strategy as patient is largely asymptomatic.   If workup is consistent with a tachycardia induced cardiomyopathy then will recommend ESTRELLA-DCCV  7/19 TTE: EF 66% 1. Mitral annular calcification, otherwise normal mitral  valve. Mild mitral regurgitation. 2. Mildly dilated left atrium.  LA volume index = 35 cc/m2. 3. Normal left ventricular internal dimensions and wall thicknesses. 4. Normal left ventricular systolic function. No segmental wall motion abnormalities. 5. Normal right ventricular size and function. 6. Estimated right ventricular systolic pressure equals 42 mm Hg, assuming right atrial pressure equals 10 mm Hg, consistent with mild pulmonary hypertension.  7/19 CARDIO recommending  -lifelong anticoagulation.

## 2017-07-19 NOTE — PROGRESS NOTE ADULT - SUBJECTIVE AND OBJECTIVE BOX
EP ATTENDING    Tele: rate controlled atypical AFL    no palpitations, no syncope, no angina    furosemide    Tablet 20 milliGRAM(s) Oral daily  metoprolol succinate ER 25 milliGRAM(s) Oral daily  senna 2 Tablet(s) Oral at bedtime  docusate sodium 100 milliGRAM(s) Oral three times a day  polyethylene glycol 3350 17 Gram(s) Oral once PRN  enoxaparin Injectable 75 milliGRAM(s) SubCutaneous every 12 hours                            11.8   5.18  )-----------( 297      ( 19 Jul 2017 05:24 )             34.6       07-19    143  |  105  |  12  ----------------------------<  82  4.0   |  26  |  0.74    Ca    9.1      19 Jul 2017 05:24  Phos  3.8     07-18  Mg     2.1     07-19    TPro  7.7  /  Alb  4.4  /  TBili  0.3  /  DBili  x   /  AST  18  /  ALT  9   /  AlkPhos  54  07-17      CARDIAC MARKERS ( 18 Jul 2017 01:50 )  x     / < 0.06 ng/mL / 149 u/L / 3.11 ng/mL / x      CARDIAC MARKERS ( 17 Jul 2017 18:25 )  x     / < 0.06 ng/mL / 202 u/L / x     / x            T(C): 36.5 (07-19-17 @ 05:09), Max: 37 (07-18-17 @ 12:48)  HR: 71 (07-19-17 @ 05:09) (71 - 81)  BP: 123/68 (07-19-17 @ 05:09) (113/62 - 124/65)  RR: 18 (07-19-17 @ 05:09) (18 - 18)  SpO2: 100% (07-19-17 @ 05:09) (99% - 100%)  Wt(kg): --    no JVD  IRR, no murmurs  CTAB  soft nt/nd  no c/c/e    Echo pending  TSH normal    ASSESSMENT/PLAN: She is a pleasant 77 y/o female who was diagnosed with atypical atrial flutter (likely mitral flutter) in April 2017. She was not seen by a cardiologist nor was A/C initiated. She is now admitted with dyspnea and lower ext edema, and EP is consulted for further recommendations. Echo pending, TSH normal. CHADS-VASC 4. She denies palpitations, angina or syncope.    -change heparin drip to lovenox. Recommend lifelong A/C for CVA prevention. Will eventually d/c home on whichever NOAC is covered by her insurance plan  -continue toprol xl 25mg daily and lasix 20 po daily  -get echo  -final EP reccs pending echo. If echo normal then will opt for a rate control strategy as patient is largely asymptomatic. If workup is consistent with a tachycardia induced cardiomyopathy then will recommend ESTRELLA-VALERIY Bustos M.D., [facility]  [phone]

## 2017-07-19 NOTE — PHYSICAL THERAPY INITIAL EVALUATION ADULT - PERTINENT HX OF CURRENT PROBLEM, REHAB EVAL
77 y/o F with h/o HTN presents to the ED for atrial flutter. Pt states she saw her out patient doctor who referred her to Dr. Suárez for her atrial flutter. Pt was sent her to be admitted by Dr. Sow. Pt states she has shortness of breath when she walks a few blocks.

## 2017-07-19 NOTE — DISCHARGE NOTE ADULT - CARE PROVIDER_API CALL
Seferino Bustos), Cardiac Electrophysiology; Cardiovascular Disease; Internal Medicine; Nuclear Cardiology  [address]  [place] [address]  Phone: [phone]  Fax: [phone] Seferino Bustos), Cardiac Electrophysiology; Cardiovascular Disease; Internal Medicine; Nuclear Cardiology  [address]  [place] [address]  Phone: [phone]  Fax: [phone]    Rafy Suárez), Cardiology  [address]  [place] [address]  Phone: [phone]  Fax: [phone]

## 2017-07-19 NOTE — DISCHARGE NOTE ADULT - PATIENT PORTAL LINK FT
“You can access the [facility] Patient Portal, offered by [facility], by registering with the following website: [email or web address]”

## 2017-07-19 NOTE — DISCHARGE NOTE ADULT - PLAN OF CARE
Heart rate control and anticoagulation. Continue metoprolol as prescribed.  Continue xarelto as prescribed.  Follow up with electrophysiology (Dr. Bustos) within 1-2 weeks. Continue metoprolol and lasix as prescribed.  Low salt diet.  Follow up with your PCP and cardiologist.

## 2017-07-19 NOTE — DISCHARGE NOTE ADULT - COMMUNITY RESOURCES
Extended [facility] [phone] for reinstatement of home attendent tomorrow Friday 7/21

## 2017-07-19 NOTE — DISCHARGE NOTE ADULT - ADDITIONAL INSTRUCTIONS
Follow up with Dr. Bustos (electrophysiologist) within 1-2 weeks; call for appointment.  Follow up with your PCP and cardiologist within 1-2 weeks; call for appointment. Follow up with Dr. Bustos (electrophysiologist) within 1-2 weeks; call for appointment.  Follow up with your PCP and cardiologist Dr Sow within 1-2 weeks; call for appointment.-[phone] Follow up with Dr. Bustos (electrophysiologist) within 1-2 weeks; call for appointment.  Follow up with your PCP and cardiologist Dr Suárez  on 8/7/17 at 400 pm  ; call to verify  appointment.-[phone]

## 2017-07-19 NOTE — DISCHARGE NOTE ADULT - CARE PLAN
Principal Discharge DX:	Atrial flutter  Goal:	Heart rate control and anticoagulation.  Instructions for follow-up, activity and diet:	Continue metoprolol as prescribed.  Continue xarelto as prescribed.  Follow up with electrophysiology (Dr. Bustos) within 1-2 weeks.  Secondary Diagnosis:	HTN (hypertension)  Instructions for follow-up, activity and diet:	Continue metoprolol and lasix as prescribed.  Low salt diet.  Follow up with your PCP and cardiologist.

## 2017-07-19 NOTE — PROGRESS NOTE ADULT - SUBJECTIVE AND OBJECTIVE BOX
SUBJECTIVE: No Cp, SOB      MEDICATIONS  (STANDING):  furosemide    Tablet 20 milliGRAM(s) Oral daily  metoprolol succinate ER 25 milliGRAM(s) Oral daily  senna 2 Tablet(s) Oral at bedtime  docusate sodium 100 milliGRAM(s) Oral three times a day  enoxaparin Injectable 75 milliGRAM(s) SubCutaneous every 12 hours    LABS:                        11.8   5.18  )-----------( 297      ( 19 Jul 2017 05:24 )             34.6     143  |  105  |  12  ----------------------------<  82  4.0   |  26  |  0.74    Ca    9.1      19 Jul 2017 05:24  Phos  3.8     07-18  Mg     2.1     07-19    TPro  7.7  /  Alb  4.4  /  TBili  0.3  /  DBili  x   /  AST  18  /  ALT  9   /  AlkPhos  54  07-17  PT/INR - ( 19 Jul 2017 05:24 )   PT: 11.6 SEC;   INR: 1.03     PTT - ( 19 Jul 2017 09:50 )  PTT:76.2 SEC  CARDIAC MARKERS ( 18 Jul 2017 01:50 )  x     / < 0.06 ng/mL / 149 u/L / 3.11 ng/mL / x      CARDIAC MARKERS ( 17 Jul 2017 18:25 )  x     / < 0.06 ng/mL / 202 u/L / x     / x        PHYSICAL EXAM:  Vital Signs Last 24 Hrs  T(C): 36.6 (19 Jul 2017 12:48), Max: 36.6 (18 Jul 2017 22:15)  T(F): 97.8 (19 Jul 2017 12:48), Max: 97.8 (18 Jul 2017 22:15)  HR: 70 (19 Jul 2017 12:48) (70 - 81)  BP: 113/61 (19 Jul 2017 12:48) (113/61 - 123/68)  RR: 18 (19 Jul 2017 12:48) (18 - 18)  SpO2: 99% (19 Jul 2017 12:48) (99% - 100%)    HEENT: Normal Oral mucosa, PERRL, EOMI  Lymphatic: No obvious lymphadenopathy, No edema  Cardiovascular: Normal S1S2, No JVD, 1/6 MARYANN, Peripheral pulses palpable 2+ B/L  Respiratory: Lungs clear to auscultation, normal effort  Gastrointestinal: Abdomen soft, ND, NT, +BS  Skin: Warm, dry, intact. No cyanosis, No rash.  Musculoskeletal: Normal ROM, normal strength  Psychiatric: Appropriate Mood and Affect      DIAGNOSTIC DATA  TELEMETRY: stypical aflutter rate controlled    ASSESSMENT AND PLAN:  75 y/o female PMH HTN, who was diagnosed with atypical atrial flutter (likely mitral flutter) in April 2017. She was not seen by a cardiologist nor was A/C initiated. She is now admitted with dyspnea and lower ext edema    --Echo pending,   --TSH normal.   --CHADS-VASC =4. Recommend lifelong A/C for CVA prevention. Will eventually d/c home on whichever NOAC is covered by her insurance plan  --continue toprol xl 25mg daily and lasix 20 po daily  --further plans pending echo results    Makenna Vazquez PA-C  [facility]  [address], [address]   [place] [address]  office [phone]  pager [phone]

## 2017-07-19 NOTE — DISCHARGE NOTE ADULT - MEDICATION SUMMARY - MEDICATIONS TO TAKE
I will START or STAY ON the medications listed below when I get home from the hospital:    rivaroxaban 20 mg oral tablet  -- 1 tab(s) by mouth every 24 hours  -- Indication: For Atrial flutter    metoprolol succinate 25 mg oral tablet, extended release  -- 1 tab(s) by mouth once a day -for bladder spasms  -- Indication: For cardiac    furosemide 20 mg oral tablet  -- 1 tab(s) by mouth once a day  -- Indication: For HTN (hypertension)

## 2017-07-20 VITALS
SYSTOLIC BLOOD PRESSURE: 133 MMHG | DIASTOLIC BLOOD PRESSURE: 68 MMHG | TEMPERATURE: 99 F | RESPIRATION RATE: 18 BRPM | HEART RATE: 83 BPM | OXYGEN SATURATION: 99 %

## 2017-07-20 LAB
BUN SERPL-MCNC: 19 MG/DL — SIGNIFICANT CHANGE UP (ref 7–23)
CALCIUM SERPL-MCNC: 9.5 MG/DL — SIGNIFICANT CHANGE UP (ref 8.4–10.5)
CHLORIDE SERPL-SCNC: 106 MMOL/L — SIGNIFICANT CHANGE UP (ref 98–107)
CO2 SERPL-SCNC: 24 MMOL/L — SIGNIFICANT CHANGE UP (ref 22–31)
CREAT SERPL-MCNC: 0.83 MG/DL — SIGNIFICANT CHANGE UP (ref 0.5–1.3)
GLUCOSE SERPL-MCNC: 88 MG/DL — SIGNIFICANT CHANGE UP (ref 70–99)
HCT VFR BLD CALC: 35.8 % — SIGNIFICANT CHANGE UP (ref 34.5–45)
HGB BLD-MCNC: 12.6 G/DL — SIGNIFICANT CHANGE UP (ref 11.5–15.5)
INR BLD: 1.54 — HIGH (ref 0.88–1.17)
MAGNESIUM SERPL-MCNC: 2.1 MG/DL — SIGNIFICANT CHANGE UP (ref 1.6–2.6)
MCHC RBC-ENTMCNC: 27 PG — SIGNIFICANT CHANGE UP (ref 27–34)
MCHC RBC-ENTMCNC: 35.2 % — SIGNIFICANT CHANGE UP (ref 32–36)
MCV RBC AUTO: 76.8 FL — LOW (ref 80–100)
NRBC # FLD: 0 — SIGNIFICANT CHANGE UP
PLATELET # BLD AUTO: 299 K/UL — SIGNIFICANT CHANGE UP (ref 150–400)
PMV BLD: 10.9 FL — SIGNIFICANT CHANGE UP (ref 7–13)
POTASSIUM SERPL-MCNC: 4.3 MMOL/L — SIGNIFICANT CHANGE UP (ref 3.5–5.3)
POTASSIUM SERPL-SCNC: 4.3 MMOL/L — SIGNIFICANT CHANGE UP (ref 3.5–5.3)
PROTHROM AB SERPL-ACNC: 17.4 SEC — HIGH (ref 9.8–13.1)
RBC # BLD: 4.66 M/UL — SIGNIFICANT CHANGE UP (ref 3.8–5.2)
RBC # FLD: 15.7 % — HIGH (ref 10.3–14.5)
SODIUM SERPL-SCNC: 142 MMOL/L — SIGNIFICANT CHANGE UP (ref 135–145)
WBC # BLD: 5.07 K/UL — SIGNIFICANT CHANGE UP (ref 3.8–10.5)
WBC # FLD AUTO: 5.07 K/UL — SIGNIFICANT CHANGE UP (ref 3.8–10.5)

## 2017-07-20 RX ORDER — METOPROLOL TARTRATE 50 MG
1 TABLET ORAL
Qty: 30 | Refills: 0 | OUTPATIENT
Start: 2017-07-20 | End: 2017-08-19

## 2017-07-20 RX ORDER — RIVAROXABAN 15 MG-20MG
1 KIT ORAL
Qty: 30 | Refills: 0 | OUTPATIENT
Start: 2017-07-20 | End: 2017-08-19

## 2017-07-20 RX ORDER — FUROSEMIDE 40 MG
1 TABLET ORAL
Qty: 0 | Refills: 0 | COMMUNITY

## 2017-07-20 RX ORDER — RIVAROXABAN 15 MG-20MG
1 KIT ORAL
Qty: 30 | Refills: 0
Start: 2017-07-20 | End: 2017-08-19

## 2017-07-20 RX ORDER — FUROSEMIDE 40 MG
1 TABLET ORAL
Qty: 30 | Refills: 0
Start: 2017-07-20 | End: 2017-08-19

## 2017-07-20 RX ADMIN — Medication 20 MILLIGRAM(S): at 06:29

## 2017-07-20 RX ADMIN — Medication 25 MILLIGRAM(S): at 06:29

## 2017-07-20 NOTE — PROGRESS NOTE ADULT - SUBJECTIVE AND OBJECTIVE BOX
EP ATTENDING    Tele: rate controlled atypical AFL    no palpitations, no syncope, no angina    furosemide    Tablet 20 milliGRAM(s) Oral daily  metoprolol succinate ER 25 milliGRAM(s) Oral daily  rivaroxaban 20 milliGRAM(s) Oral every 24 hours                            12.6   5.07  )-----------( 299      ( 20 Jul 2017 06:15 )             35.8       07-20    142  |  106  |  19  ----------------------------<  88  4.3   |  24  |  0.83    Ca    9.5      20 Jul 2017 06:15  Mg     2.1     07-20        CARDIAC MARKERS ( 18 Jul 2017 01:50 )  x     / < 0.06 ng/mL / 149 u/L / 3.11 ng/mL / x      CARDIAC MARKERS ( 17 Jul 2017 18:25 )  x     / < 0.06 ng/mL / 202 u/L / x     / x            T(C): 36.5 (07-20-17 @ 06:24), Max: 36.7 (07-19-17 @ 21:10)  HR: 69 (07-20-17 @ 06:24) (69 - 71)  BP: 126/70 (07-20-17 @ 06:24) (110/55 - 126/70)  RR: 16 (07-20-17 @ 06:24) (16 - 18)  SpO2: 100% (07-20-17 @ 06:24) (99% - 100%)  Wt(kg): --    I&O's Summary    no JVD  IRR, no murmurs  CTAB  soft nt/nd  no c/c/e    Echo unremarkable  TSH normal    ASSESSMENT/PLAN: She is a pleasant 75 y/o female who was diagnosed with atypical atrial flutter (likely mitral flutter) in April 2017. She was not seen by a cardiologist nor was A/C initiated. She is now admitted with dyspnea and lower ext edema, and EP is consulted for further recommendations. Echo unremarkable, TSH normal. CHADS-VASC 4. She denies palpitations, angina or syncope.    -continue toprol xl 25mg daily and lasix 20 po daily and xarelto 20mg daily  -lifelong A/C  -d/c home  -f/u with [facility] as scheduled  -no further EP workup needed    Seferino Bustos M.D., [facility]  [phone]

## 2017-07-20 NOTE — PROGRESS NOTE ADULT - SUBJECTIVE AND OBJECTIVE BOX
SUBJECTIVE: No Cp, SOB      MEDICATIONS  (STANDING):  furosemide    Tablet 20 milliGRAM(s) Oral daily  metoprolol succinate ER 25 milliGRAM(s) Oral daily  rivaroxaban 20 milliGRAM(s) Oral every 24 hours    LABS:                        12.6   5.07  )-----------( 299      ( 20 Jul 2017 06:15 )             35.8     Hemoglobin: 12.6 g/dL (07-20 @ 06:15)  Hemoglobin: 11.8 g/dL (07-19 @ 05:24)  Hemoglobin: 11.8 g/dL (07-19 @ 05:24)  Hemoglobin: 11.6 g/dL (07-18 @ 05:00)  Hemoglobin: 12.5 g/dL (07-17 @ 18:25)    07-20    142  |  106  |  19  ----------------------------<  88  4.3   |  24  |  0.83    Ca    9.5      20 Jul 2017 06:15  Mg     2.1     07-20    Creatinine Trend: 0.83<--, 0.74<--, 0.74<--, 0.92<--  PT/INR - ( 20 Jul 2017 06:15 )   PT: 17.4 SEC;   INR: 1.54       CARDIAC MARKERS ( 18 Jul 2017 01:50 )  x     / < 0.06 ng/mL / 149 u/L / 3.11 ng/mL / x      CARDIAC MARKERS ( 17 Jul 2017 18:25 )  x     / < 0.06 ng/mL / 202 u/L / x     / x          PHYSICAL EXAM  Vital Signs Last 24 Hrs  T(C): 36.5 (20 Jul 2017 06:24), Max: 36.7 (19 Jul 2017 21:10)  T(F): 97.7 (20 Jul 2017 06:24), Max: 98 (19 Jul 2017 21:10)  HR: 69 (20 Jul 2017 06:24) (69 - 71)  BP: 126/70 (20 Jul 2017 06:24) (110/55 - 126/70)  RR: 16 (20 Jul 2017 06:24) (16 - 18)  SpO2: 100% (20 Jul 2017 06:24) (99% - 100%)      HEENT: Normal Oral mucosa, PERRL, EOMI  Lymphatic: No obvious lymphadenopathy, No edema  Cardiovascular: Normal S1S2, No JVD, 1/6 MARYANN  Respiratory: Lungs clear to auscultation, normal effort  Gastrointestinal: Abdomen soft, ND, NT, +BS  Skin: Warm, dry, intact. No cyanosis, No rash.  Musculoskeletal: Normal ROM, normal strength  Psychiatric: Appropriate Mood and Affect      DIAGNOSTIC DATA  TELEMETRY: stypical aflutter rate controlled    < from: Transthoracic Echocardiogram (07.19.17 @ 14:49) >  CONCLUSIONS:  1. Mitral annular calcification, otherwise normal mitral  valve. Mild mitral regurgitation.  2. Mildly dilated left atrium.  LA volume index = 35 cc/m2.  3. Normal left ventricular internal dimensions and wall  thicknesses.  4. Normal left ventricular systolic function. No segmental  wall motion abnormalities.  5. Normal right ventricular size and function.  6. Estimated right ventricular systolic pressure equals 42  mm Hg, assuming right atrial pressure equals 10 mm Hg,  consistent with mild pulmonary hypertension.  ------------------------------------------------------------------------  Confirmed on  7/19/2017 - 15:46:42 by Ángel Green M.D.    < end of copied text >      ASSESSMENT AND PLAN:  77 y/o female PMH HTN, who was diagnosed with atypical atrial flutter (likely mitral flutter) in April 2017. She was not seen by a cardiologist nor was A/C initiated. She is now admitted with dyspnea and lower ext edema s/p IV diuresis    --TSH normal.   --CHADS-VASC =4. Recommend lifelong A/C for CVA prevention. Started on Xarelto 20 QD.  --continue toprol xl 25mg daily (vrates well controlled and patient largely asymptomatic) and lasix 20 po daily  --Echo noted, plan for DC home and OP f/u Dr Suárez Monday August 7 at 4PM    Makenna Vazquez PA-C  [facility]  [address], [address]   [place] [address]  office [phone]  pager [phone]

## 2017-10-04 ENCOUNTER — INPATIENT (INPATIENT)
Facility: HOSPITAL | Age: 77
LOS: 7 days | Discharge: HOME HEALTH | End: 2017-10-12
Attending: HOSPITALIST | Admitting: HOSPITALIST
Payer: MEDICARE

## 2017-10-04 VITALS
DIASTOLIC BLOOD PRESSURE: 73 MMHG | WEIGHT: 169.98 LBS | RESPIRATION RATE: 16 BRPM | HEIGHT: 57 IN | SYSTOLIC BLOOD PRESSURE: 124 MMHG | HEART RATE: 132 BPM | OXYGEN SATURATION: 100 % | TEMPERATURE: 98 F

## 2017-10-04 DIAGNOSIS — I10 ESSENTIAL (PRIMARY) HYPERTENSION: ICD-10-CM

## 2017-10-04 DIAGNOSIS — R79.89 OTHER SPECIFIED ABNORMAL FINDINGS OF BLOOD CHEMISTRY: ICD-10-CM

## 2017-10-04 DIAGNOSIS — L03.116 CELLULITIS OF LEFT LOWER LIMB: ICD-10-CM

## 2017-10-04 DIAGNOSIS — R74.8 ABNORMAL LEVELS OF OTHER SERUM ENZYMES: ICD-10-CM

## 2017-10-04 DIAGNOSIS — I48.92 UNSPECIFIED ATRIAL FLUTTER: ICD-10-CM

## 2017-10-04 DIAGNOSIS — R10.12 LEFT UPPER QUADRANT PAIN: ICD-10-CM

## 2017-10-04 DIAGNOSIS — R60.0 LOCALIZED EDEMA: ICD-10-CM

## 2017-10-04 LAB
ALBUMIN SERPL ELPH-MCNC: 2.5 G/DL — LOW (ref 3.3–5)
ALP SERPL-CCNC: 353 U/L — HIGH (ref 40–120)
ALT FLD-CCNC: 163 U/L — HIGH (ref 12–78)
ANION GAP SERPL CALC-SCNC: 12 MMOL/L — SIGNIFICANT CHANGE UP (ref 5–17)
AST SERPL-CCNC: 226 U/L — HIGH (ref 15–37)
BILIRUB SERPL-MCNC: 1.7 MG/DL — HIGH (ref 0.2–1.2)
BUN SERPL-MCNC: 38 MG/DL — HIGH (ref 7–23)
CALCIUM SERPL-MCNC: 8.9 MG/DL — SIGNIFICANT CHANGE UP (ref 8.5–10.1)
CHLORIDE SERPL-SCNC: 106 MMOL/L — SIGNIFICANT CHANGE UP (ref 96–108)
CK MB BLD-MCNC: 1.8 % — SIGNIFICANT CHANGE UP (ref 0–3.5)
CK MB CFR SERPL CALC: 4.1 NG/ML — HIGH (ref 0.5–3.6)
CK SERPL-CCNC: 230 U/L — HIGH (ref 26–192)
CO2 SERPL-SCNC: 26 MMOL/L — SIGNIFICANT CHANGE UP (ref 22–31)
CREAT SERPL-MCNC: 0.81 MG/DL — SIGNIFICANT CHANGE UP (ref 0.5–1.3)
GLUCOSE SERPL-MCNC: 112 MG/DL — HIGH (ref 70–99)
HCT VFR BLD CALC: 32.5 % — LOW (ref 34.5–45)
HGB BLD-MCNC: 11 G/DL — LOW (ref 11.5–15.5)
LACTATE SERPL-SCNC: 1.6 MMOL/L — SIGNIFICANT CHANGE UP (ref 0.7–2)
LYMPHOCYTES # BLD AUTO: 5 % — LOW (ref 13–44)
MCHC RBC-ENTMCNC: 27.8 PG — SIGNIFICANT CHANGE UP (ref 27–34)
MCHC RBC-ENTMCNC: 33.9 GM/DL — SIGNIFICANT CHANGE UP (ref 32–36)
MCV RBC AUTO: 81.9 FL — SIGNIFICANT CHANGE UP (ref 80–100)
MONOCYTES NFR BLD AUTO: 9 % — SIGNIFICANT CHANGE UP (ref 2–14)
NEUTROPHILS NFR BLD AUTO: 83 % — HIGH (ref 43–77)
NT-PROBNP SERPL-SCNC: 3211 PG/ML — HIGH (ref 0–450)
PLATELET # BLD AUTO: 344 K/UL — SIGNIFICANT CHANGE UP (ref 150–400)
POTASSIUM SERPL-MCNC: 3.4 MMOL/L — LOW (ref 3.5–5.3)
POTASSIUM SERPL-SCNC: 3.4 MMOL/L — LOW (ref 3.5–5.3)
PROT SERPL-MCNC: 7.5 GM/DL — SIGNIFICANT CHANGE UP (ref 6–8.3)
RBC # BLD: 3.97 M/UL — SIGNIFICANT CHANGE UP (ref 3.8–5.2)
RBC # FLD: 15.4 % — HIGH (ref 11–15)
SODIUM SERPL-SCNC: 144 MMOL/L — SIGNIFICANT CHANGE UP (ref 135–145)
TROPONIN I SERPL-MCNC: 1.49 NG/ML — HIGH (ref 0.01–0.04)
WBC # BLD: 23.8 K/UL — HIGH (ref 3.8–10.5)
WBC # FLD AUTO: 23.8 K/UL — HIGH (ref 3.8–10.5)

## 2017-10-04 PROCEDURE — 74177 CT ABD & PELVIS W/CONTRAST: CPT | Mod: 26

## 2017-10-04 PROCEDURE — 71010: CPT | Mod: 26

## 2017-10-04 PROCEDURE — 93970 EXTREMITY STUDY: CPT | Mod: 26

## 2017-10-04 PROCEDURE — 99223 1ST HOSP IP/OBS HIGH 75: CPT

## 2017-10-04 PROCEDURE — 99285 EMERGENCY DEPT VISIT HI MDM: CPT

## 2017-10-04 RX ORDER — FUROSEMIDE 40 MG
40 TABLET ORAL EVERY 12 HOURS
Qty: 0 | Refills: 0 | Status: DISCONTINUED | OUTPATIENT
Start: 2017-10-04 | End: 2017-10-09

## 2017-10-04 RX ORDER — VANCOMYCIN HCL 1 G
1000 VIAL (EA) INTRAVENOUS ONCE
Qty: 0 | Refills: 0 | Status: COMPLETED | OUTPATIENT
Start: 2017-10-04 | End: 2017-10-04

## 2017-10-04 RX ORDER — PIPERACILLIN AND TAZOBACTAM 4; .5 G/20ML; G/20ML
3.38 INJECTION, POWDER, LYOPHILIZED, FOR SOLUTION INTRAVENOUS EVERY 8 HOURS
Qty: 0 | Refills: 0 | Status: DISCONTINUED | OUTPATIENT
Start: 2017-10-04 | End: 2017-10-12

## 2017-10-04 RX ORDER — RIVAROXABAN 15 MG-20MG
20 KIT ORAL EVERY 24 HOURS
Qty: 0 | Refills: 0 | Status: DISCONTINUED | OUTPATIENT
Start: 2017-10-04 | End: 2017-10-12

## 2017-10-04 RX ORDER — CEFTRIAXONE 500 MG/1
1 INJECTION, POWDER, FOR SOLUTION INTRAMUSCULAR; INTRAVENOUS ONCE
Qty: 0 | Refills: 0 | Status: COMPLETED | OUTPATIENT
Start: 2017-10-04 | End: 2017-10-04

## 2017-10-04 RX ORDER — VANCOMYCIN HCL 1 G
1000 VIAL (EA) INTRAVENOUS EVERY 12 HOURS
Qty: 0 | Refills: 0 | Status: DISCONTINUED | OUTPATIENT
Start: 2017-10-04 | End: 2017-10-12

## 2017-10-04 RX ORDER — METOPROLOL TARTRATE 50 MG
25 TABLET ORAL DAILY
Qty: 0 | Refills: 0 | Status: DISCONTINUED | OUTPATIENT
Start: 2017-10-04 | End: 2017-10-12

## 2017-10-04 RX ORDER — FUROSEMIDE 40 MG
40 TABLET ORAL ONCE
Qty: 0 | Refills: 0 | Status: COMPLETED | OUTPATIENT
Start: 2017-10-04 | End: 2017-10-04

## 2017-10-04 RX ADMIN — CEFTRIAXONE 100 GRAM(S): 500 INJECTION, POWDER, FOR SOLUTION INTRAMUSCULAR; INTRAVENOUS at 15:54

## 2017-10-04 RX ADMIN — Medication 250 MILLIGRAM(S): at 16:54

## 2017-10-04 RX ADMIN — Medication 40 MILLIGRAM(S): at 18:22

## 2017-10-04 RX ADMIN — PIPERACILLIN AND TAZOBACTAM 25 GRAM(S): 4; .5 INJECTION, POWDER, LYOPHILIZED, FOR SOLUTION INTRAVENOUS at 21:54

## 2017-10-04 NOTE — H&P ADULT - HISTORY OF PRESENT ILLNESS
76yo F w/ pmh inclusive of htn, obesity, a. flutter on xarelto, c/o BLE edema w/ weeping X 1 week, intermittent dyspnea on exertion- patient is normally on lasix and yet two days ago styated to get pain and erythema with increased swelling. she denies fever . also complain of of intermittent abdominal pain with no nausea vomiting or diarrhea

## 2017-10-04 NOTE — ED ADULT NURSE NOTE - OBJECTIVE STATEMENT
Patient alert, stating that she was sick last week with an upper respiratory infection, was taking Coridin, states she felt week and went to ambulate this morning and her left leg collapsed under neath her. Patient denies hitting her head or any part of herself. Denies any pain. Is on Xaralto.

## 2017-10-04 NOTE — H&P ADULT - NSHPPHYSICALEXAM_GEN_ALL_CORE
GENERAL: NAD well-developed  HEAD:  Atraumatic, Normocephalic  EYES: EOMI, PERRLA, conjunctiva and sclera clear  ENMT: No tonsillar erythema, exudates, or enlargement; Moist mucous membranes, Good dentition, No lesions  NECK: Supple, No JVD, Normal thyroid  NERVOUS SYSTEM:  Alert & Oriented X3, Good concentration; Motor Strength 5/5 B/L upper and lower extremities; DTRs 2+ intact and symmetric  CHEST/LUNG: Clear to percussion bilaterally; No rales, rhonchi, wheezing, or rubs  HEART: Regular rate and rhythm; No murmurs, rubs, or gallops  ABDOMEN: mild tenderness upper epigastric area , Nondistended; Bowel sounds present  EXTREMITIES:  bilateral brawney edema with left leg  erythema /warmth and weeping of   LYMPH: No lymphadenopathy   SKIN: No rashes or lesions

## 2017-10-04 NOTE — ED PROVIDER NOTE - OBJECTIVE STATEMENT
Pertinent PMH/PSH/FHx/SHx and Review of Systems contained within:  78yo F w/ pmh inclusive of htn, obesity, a. flutter on xarelto, c/o BLE edema w/ weeping X 1 week, intermittent dyspnea on exertion Pertinent PMH/PSH/FHx/SHx and Review of Systems contained within:  76yo F w/ pmh inclusive of htn, obesity, a. flutter on xarelto, diastolic chf on lasix, c/o BLE edema w/ weeping X 1 week, intermittent dyspnea on exertion. No fever/chills, No photophobia/eye pain/changes in vision, No ear pain/sore throat/dysphagia, No chest pain/palpitations, no wheeze/stridor, No abdominal pain, No N/V/D, no dysuria/frequency/discharge, No neck/back pain, no changes in neurological status/function.

## 2017-10-04 NOTE — ED PROVIDER NOTE - MEDICAL DECISION MAKING DETAILS
[age]yo w/ pmh as above here for BLE edema (L>R). H&P, along w/ tachycardia and leukocytosis consistent w/ sepsis 2/2 to cellulitis, as well as chf exacerbation, and likely demand ischemia. IV abx, lasix (will hold fluids for now given chf exacerbation and fact that pt's lactate is w/i normal range). Admit to medicine w/ cards consult.

## 2017-10-04 NOTE — ED PROVIDER NOTE - PHYSICAL EXAMINATION
Gen: Alert, in mild distress  Head: NC, AT, EOMI, normal lids/conjunctiva  ENT:  normal hearing, patent oropharynx without erythema/exudate, uvula midline  Neck: +supple, no tenderness/meningismus/JVD, +Trachea midline  Chest: no chest wall tenderness, equal chest rise  Pulm: Bilateral BS, normal resp effort, mild crackles in b/l bases, no wheeze/stridor/retractions  CV: tachycardic, no M/R/G, +dist pulses  Abd: soft, NT/ND, +BS, no hepatosplenomegaly  Rectal: deferred  Mskel: 3+pitting edema BLE; erythema/warmth/ttp of left leg  Skin: cellulitis of left leg  Neuro: AAOx3, no sensory/motor deficits, CN 2-12 intact

## 2017-10-04 NOTE — H&P ADULT - NSHPLABSRESULTS_GEN_ALL_CORE
11.0   23.8  )-----------( 344      ( 04 Oct 2017 15:48 )             32.5   10-04    144  |  106  |  38<H>  ----------------------------<  112<H>  3.4<L>   |  26  |  0.81    Ca    8.9      04 Oct 2017 15:48    TPro  7.5  /  Alb  2.5<L>  /  TBili  1.7<H>  /  DBili  x   /  AST  226<H>  /  ALT  163<H>  /  AlkPhos  353<H>  10-04  < from: Xray Chest 1 View AP/PA. (10.04.17 @ 18:52) >    IMPRESSION:    No focal air space opacities.    < from: US Duplex Venous Lower Ext Complete, Bilateral (10.04.17 @ 16:24) >    No evidence of bilateral lower extremity deep venous thrombosis.

## 2017-10-04 NOTE — ED PROVIDER NOTE - CARE PLAN
Principal Discharge DX:	Leg edema  Secondary Diagnosis:	Troponin level elevated  Secondary Diagnosis:	Cellulitis

## 2017-10-05 DIAGNOSIS — E87.6 HYPOKALEMIA: ICD-10-CM

## 2017-10-05 LAB
ANION GAP SERPL CALC-SCNC: 11 MMOL/L — SIGNIFICANT CHANGE UP (ref 5–17)
APPEARANCE UR: CLEAR — SIGNIFICANT CHANGE UP
BILIRUB UR-MCNC: NEGATIVE — SIGNIFICANT CHANGE UP
BUN SERPL-MCNC: 27 MG/DL — HIGH (ref 7–23)
CALCIUM SERPL-MCNC: 8.3 MG/DL — LOW (ref 8.5–10.1)
CHLORIDE SERPL-SCNC: 107 MMOL/L — SIGNIFICANT CHANGE UP (ref 96–108)
CO2 SERPL-SCNC: 27 MMOL/L — SIGNIFICANT CHANGE UP (ref 22–31)
COLOR SPEC: YELLOW — SIGNIFICANT CHANGE UP
CREAT SERPL-MCNC: 0.71 MG/DL — SIGNIFICANT CHANGE UP (ref 0.5–1.3)
DIFF PNL FLD: NEGATIVE — SIGNIFICANT CHANGE UP
GLUCOSE SERPL-MCNC: 111 MG/DL — HIGH (ref 70–99)
GLUCOSE UR QL: NEGATIVE MG/DL — SIGNIFICANT CHANGE UP
HAV IGM SER-ACNC: SIGNIFICANT CHANGE UP
HBV CORE IGM SER-ACNC: SIGNIFICANT CHANGE UP
HBV SURFACE AG SER-ACNC: SIGNIFICANT CHANGE UP
HCT VFR BLD CALC: 30.2 % — LOW (ref 34.5–45)
HCV AB S/CO SERPL IA: 0.1 S/CO — SIGNIFICANT CHANGE UP
HCV AB SERPL-IMP: SIGNIFICANT CHANGE UP
HGB BLD-MCNC: 10.2 G/DL — LOW (ref 11.5–15.5)
KETONES UR-MCNC: NEGATIVE — SIGNIFICANT CHANGE UP
LEUKOCYTE ESTERASE UR-ACNC: NEGATIVE — SIGNIFICANT CHANGE UP
MAGNESIUM SERPL-MCNC: 2.6 MG/DL — SIGNIFICANT CHANGE UP (ref 1.6–2.6)
MCHC RBC-ENTMCNC: 27.1 PG — SIGNIFICANT CHANGE UP (ref 27–34)
MCHC RBC-ENTMCNC: 33.6 GM/DL — SIGNIFICANT CHANGE UP (ref 32–36)
MCV RBC AUTO: 80.5 FL — SIGNIFICANT CHANGE UP (ref 80–100)
NITRITE UR-MCNC: NEGATIVE — SIGNIFICANT CHANGE UP
PH UR: 5 — SIGNIFICANT CHANGE UP (ref 5–8)
PLATELET # BLD AUTO: 357 K/UL — SIGNIFICANT CHANGE UP (ref 150–400)
POTASSIUM SERPL-MCNC: 3.2 MMOL/L — LOW (ref 3.5–5.3)
POTASSIUM SERPL-SCNC: 3.2 MMOL/L — LOW (ref 3.5–5.3)
PROT UR-MCNC: NEGATIVE MG/DL — SIGNIFICANT CHANGE UP
RBC # BLD: 3.76 M/UL — LOW (ref 3.8–5.2)
RBC # FLD: 15.9 % — HIGH (ref 11–15)
SODIUM SERPL-SCNC: 145 MMOL/L — SIGNIFICANT CHANGE UP (ref 135–145)
SP GR SPEC: 1 — LOW (ref 1.01–1.02)
TROPONIN I SERPL-MCNC: 0.92 NG/ML — HIGH (ref 0.01–0.04)
TROPONIN I SERPL-MCNC: 1.03 NG/ML — HIGH (ref 0.01–0.04)
TROPONIN I SERPL-MCNC: 1.19 NG/ML — HIGH (ref 0.01–0.04)
UROBILINOGEN FLD QL: NEGATIVE MG/DL — SIGNIFICANT CHANGE UP
WBC # BLD: 22.1 K/UL — HIGH (ref 3.8–10.5)
WBC # FLD AUTO: 22.1 K/UL — HIGH (ref 3.8–10.5)

## 2017-10-05 PROCEDURE — 93010 ELECTROCARDIOGRAM REPORT: CPT

## 2017-10-05 PROCEDURE — 99233 SBSQ HOSP IP/OBS HIGH 50: CPT

## 2017-10-05 PROCEDURE — 99223 1ST HOSP IP/OBS HIGH 75: CPT

## 2017-10-05 RX ORDER — POTASSIUM CHLORIDE 20 MEQ
40 PACKET (EA) ORAL EVERY 4 HOURS
Qty: 0 | Refills: 0 | Status: COMPLETED | OUTPATIENT
Start: 2017-10-05 | End: 2017-10-05

## 2017-10-05 RX ADMIN — Medication 40 MILLIEQUIVALENT(S): at 09:44

## 2017-10-05 RX ADMIN — Medication 250 MILLIGRAM(S): at 17:53

## 2017-10-05 RX ADMIN — Medication 40 MILLIEQUIVALENT(S): at 12:09

## 2017-10-05 RX ADMIN — PIPERACILLIN AND TAZOBACTAM 25 GRAM(S): 4; .5 INJECTION, POWDER, LYOPHILIZED, FOR SOLUTION INTRAVENOUS at 07:14

## 2017-10-05 RX ADMIN — Medication 250 MILLIGRAM(S): at 06:05

## 2017-10-05 RX ADMIN — PIPERACILLIN AND TAZOBACTAM 25 GRAM(S): 4; .5 INJECTION, POWDER, LYOPHILIZED, FOR SOLUTION INTRAVENOUS at 13:27

## 2017-10-05 RX ADMIN — RIVAROXABAN 20 MILLIGRAM(S): KIT at 17:53

## 2017-10-05 RX ADMIN — Medication 40 MILLIGRAM(S): at 17:53

## 2017-10-05 RX ADMIN — PIPERACILLIN AND TAZOBACTAM 25 GRAM(S): 4; .5 INJECTION, POWDER, LYOPHILIZED, FOR SOLUTION INTRAVENOUS at 22:34

## 2017-10-05 RX ADMIN — Medication 40 MILLIGRAM(S): at 06:15

## 2017-10-05 NOTE — PROGRESS NOTE ADULT - ASSESSMENT
78yo female with a PMH of HTN, Aflutter on Xarelto; admitted c/o BLE edema w/ weeping X 1 week with difficulty walking, associated with intermittent dyspnea on exertion; patient is normally on lasix and compliant but two days ago started to get pain and erythema with increased swelling. She denies fever/chills.  Denied chest pain/palpitations/syncope. In ER, troponin mildly elevated but found to be in Aflutter with RVR.... HRs to 120-130s.  Given bbs/lasix. LE doppler negative for DVT. ECG/tele:  Aflutter 90-120s with rate-related changes

## 2017-10-05 NOTE — CONSULT NOTE ADULT - ASSESSMENT
78yo lady with a PMH of HTN, Aflutter on Xarelto; admitted c/o BLE edema w/ weeping X 1 week with difficulty walking, associated with intermittent dyspnea on exertion; patient is normally on lasix and compliant but two days ago developed pain and erythema with increased swelling. She denies fever/chills.  Denied chest pain/palpitations/syncope.  In ER, troponin mildly elevated but found to be in Aflutter with RVR.... HRs to 120-130s.  Given bbs/lasix... feeling much better.  HRs now 90s on tele.  LE doppler negative for DVT.    -monitor on tele  -trend Jayna  -daily EKG  -2D echo 76yo lady with a PMH of HTN, Aflutter on Xarelto; admitted c/o BLE edema w/ weeping X 1 week with difficulty walking, associated with intermittent dyspnea on exertion; patient is normally on lasix and compliant but two days ago developed pain and erythema with increased swelling. She denies fever/chills.  Denied chest pain/palpitations/syncope.  In ER, troponin mildly elevated but found to be in Aflutter with RVR.... HRs to 120-130s.  Given bbs/lasix... feeling much better.  HRs now 90s on tele.  LE doppler negative for DVT.  Of note, pt with similar admission to [facility] 7/2017.... edema NOT 2/2 CHF but 2/2 uncontrolled flutter.  LVEF 60% at that time.  D/c'd home then with medical therapy... bbs and anticoagulation.    -monitor on tele  -trend Jayna  -daily EKG  -2D echo  -rate control with bbs as tolerated... SBP ~100s  -cont with AC with Xarelto  -treat cellulitis with abx.. on vanco/zosyn  -when she has completed her abx course and she is euvolemic, will discuss with EPS about ablation therapy for flutter; will need ischemic eval prior

## 2017-10-05 NOTE — PHYSICAL THERAPY INITIAL EVALUATION ADULT - GENERAL OBSERVATIONS, REHAB EVAL
Pt seen supine in bed, alert and Ox4, cardiac monitor intact, IV intact, bilateral lower extremity stasis dermatitis from mid calf and distally. Pt /58, , O2 96% on room air.

## 2017-10-05 NOTE — CONSULT NOTE ADULT - SUBJECTIVE AND OBJECTIVE BOX
CARDIOLOGY CONSULT NOTE    Patient is a 77y Female with a known history of :  Atrial flutter with rapid ventricular response (I48.92)  Left upper quadrant pain (R10.12)  Elevated liver function tests (R79.89)  Leg edema (R60.0)  Troponin level elevated (R74.8)  Essential hypertension (I10)  Cellulitis of left lower extremity (L03.116)    HPI:  76yo lady with a PMH of HTN, Aflutter on Xarelto; admitted c/o BLE edema w/ weeping X 1 week with difficulty walking, associated with intermittent dyspnea on exertion; patient is normally on lasix and compliant but two days ago started to get pain and erythema with increased swelling. She denies fever/chills.  Denied chest pain/palpitations/syncope.  In ER, troponin mildly elevated but found to be in Aflutter with RVR.... HRs to 120-130s.  Given bbs/lasix... feeling much better.  HRs now 90s on tele.  LE doppler negative for DVT.    REVIEW OF SYSTEMS:    CONSTITUTIONAL: No fever, weight loss; + fatigue  EYES: No eye pain, visual disturbances, or discharge  ENMT:  No difficulty hearing, tinnitus, vertigo; No sinus or throat pain  NECK: No pain or stiffness  BREASTS: No pain, masses, or nipple discharge  RESPIRATORY: No cough, wheezing, chills or hemoptysis; + shortness of breath  CARDIOVASCULAR: No chest pain, palpitations, dizziness; + leg swelling  GASTROINTESTINAL: No abdominal or epigastric pain. No nausea, vomiting, or hematemesis; No diarrhea or constipation. No melena or hematochezia.  GENITOURINARY: No dysuria, frequency, hematuria, or incontinence  NEUROLOGICAL: No headaches, memory loss, loss of strength, numbness, or tremors  SKIN: LLE cellulitis  LYMPH NODES: No enlarged glands  ENDOCRINE: No heat or cold intolerance; No hair loss  MUSCULOSKELETAL: No joint pain or swelling; No muscle, back, or extremity pain  PSYCHIATRIC: No depression, anxiety, mood swings, or difficulty sleeping  HEME/LYMPH: No easy bruising, or bleeding gums  ALLERGY AND IMMUNOLOGIC: No hives or eczema    MEDICATIONS  (STANDING):  furosemide   Injectable 40 milliGRAM(s) IV Push every 12 hours  metoprolol succinate ER 25 milliGRAM(s) Oral daily  piperacillin/tazobactam IVPB. 3.375 Gram(s) IV Intermittent every 8 hours  rivaroxaban 20 milliGRAM(s) Oral every 24 hours  vancomycin  IVPB 1000 milliGRAM(s) IV Intermittent every 12 hours    MEDICATIONS  (PRN):      ALLERGIES: No Known Allergies      FAMILY HISTORY:  No pertinent family history in first degree relatives      PHYSICAL EXAMINATION:  -----------------------------  T(C): 37.1 (10-05-17 @ 11:48), Max: 37.4 (10-04-17 @ 22:30)  HR: 104 (10-05-17 @ 11:48) (103 - 132)  BP: 100/53 (10-05-17 @ 11:48) (100/53 - 124/73)  RR: 18 (10-05-17 @ 11:48) (14 - 18)  SpO2: 95% (10-05-17 @ 11:48) (95% - 100%)  Wt(kg): --    Height (cm): 144.78 (10-04 @ 22:30)  Weight (kg): 75 (10-04 @ 22:30)  BMI (kg/m2): 35.8 (10-04 @ 22:30)  BSA (m2): 1.66 (10-04 @ 22:30)    Constitutional: well developed, normal appearance, well groomed, well nourished, no deformities and no acute distress.   Eyes: the conjunctiva exhibited no abnormalities and the eyelids demonstrated no xanthelasmas.   HEENT: normal oral mucosa, no oral pallor and no oral cyanosis.   Neck: normal jugular venous A waves present, normal jugular venous V waves present and no jugular venous blum A waves.   Pulmonary: no respiratory distress, normal respiratory rhythm and effort; decreased BS at bases with mild fine rales.   Cardiovascular: tachy irreg; 2/6SM at LLSB  Abdomen: soft, non-tender, no hepato-splenomegaly and no abdominal mass palpated.   Musculoskeletal: the gait could not be assessed..   Extremities: no clubbing of the fingernails, no localized cyanosis, no petechial hemorrhages and no ischemic changes.   Skin: +venous stasis, 2+LE edema  Psychiatric: oriented to person, place, and time, the affect was normal, the mood was normal and not feeling anxious.     LABS:   --------  10-05    145  |  107  |  27<H>  ----------------------------<  111<H>  3.2<L>   |  27  |  0.71    Ca    8.3<L>      05 Oct 2017 07:29  Mg     2.6     10-05    TPro  7.5  /  Alb  2.5<L>  /  TBili  1.7<H>  /  DBili  x   /  AST  226<H>  /  ALT  163<H>  /  AlkPhos  353<H>  10-04                         10.2   22.1  )-----------( 357      ( 05 Oct 2017 07:30 )             30.2       10-04 @ 15:48 BNP: 3211 pg/mL    10-05 @ 07:29 CPK total:--, CKMB --, Troponin I - 1.030 ng/mL<H>  10-05 @ 00:22 CPK total:--, CKMB --, Troponin I - 1.190 ng/mL<H>  10-04 @ 15:48 CPK total:--, CKMB --, Troponin I - 1.490 ng/mL<H>          RADIOLOGY:  -----------------  ECG/tele:  Aflutter 90-120s with rate-related changes CARDIOLOGY CONSULT NOTE    Patient is a 77y Female with a known history of :  Atrial flutter with rapid ventricular response (I48.92)  Left upper quadrant pain (R10.12)  Elevated liver function tests (R79.89)  Leg edema (R60.0)  Troponin level elevated (R74.8)  Essential hypertension (I10)  Cellulitis of left lower extremity (L03.116)    HPI:  76yo lady with a PMH of HTN, Aflutter on Xarelto; admitted c/o BLE edema w/ weeping X 1 week with difficulty walking, associated with intermittent dyspnea on exertion; patient is normally on lasix and compliant but two days ago started to get pain and erythema with increased swelling. She denies fever/chills.  Denied chest pain/palpitations/syncope.  In ER, troponin mildly elevated but found to be in Aflutter with RVR.... HRs to 120-130s.  Given bbs/lasix... feeling much better.  HRs now 90s on tele.  LE doppler negative for DVT.    Of note, pt with similar admission to [facility] 7/2017.... edema NOT 2/2 CHF but 2/2 uncontrolled flutter.  LVEF 60% at that time.  D/c'd home then with medical therapy... bbs and anticoagulation.    REVIEW OF SYSTEMS:    CONSTITUTIONAL: No fever, weight loss; + fatigue  EYES: No eye pain, visual disturbances, or discharge  ENMT:  No difficulty hearing, tinnitus, vertigo; No sinus or throat pain  NECK: No pain or stiffness  BREASTS: No pain, masses, or nipple discharge  RESPIRATORY: No cough, wheezing, chills or hemoptysis; + shortness of breath  CARDIOVASCULAR: No chest pain, palpitations, dizziness; + leg swelling  GASTROINTESTINAL: No abdominal or epigastric pain. No nausea, vomiting, or hematemesis; No diarrhea or constipation. No melena or hematochezia.  GENITOURINARY: No dysuria, frequency, hematuria, or incontinence  NEUROLOGICAL: No headaches, memory loss, loss of strength, numbness, or tremors  SKIN: LLE cellulitis  LYMPH NODES: No enlarged glands  ENDOCRINE: No heat or cold intolerance; No hair loss  MUSCULOSKELETAL: No joint pain or swelling; No muscle, back, or extremity pain  PSYCHIATRIC: No depression, anxiety, mood swings, or difficulty sleeping  HEME/LYMPH: No easy bruising, or bleeding gums  ALLERGY AND IMMUNOLOGIC: No hives or eczema    MEDICATIONS  (STANDING):  furosemide   Injectable 40 milliGRAM(s) IV Push every 12 hours  metoprolol succinate ER 25 milliGRAM(s) Oral daily  piperacillin/tazobactam IVPB. 3.375 Gram(s) IV Intermittent every 8 hours  rivaroxaban 20 milliGRAM(s) Oral every 24 hours  vancomycin  IVPB 1000 milliGRAM(s) IV Intermittent every 12 hours    MEDICATIONS  (PRN):      ALLERGIES: No Known Allergies      FAMILY HISTORY:  No pertinent family history in first degree relatives      PHYSICAL EXAMINATION:  -----------------------------  T(C): 37.1 (10-05-17 @ 11:48), Max: 37.4 (10-04-17 @ 22:30)  HR: 104 (10-05-17 @ 11:48) (103 - 132)  BP: 100/53 (10-05-17 @ 11:48) (100/53 - 124/73)  RR: 18 (10-05-17 @ 11:48) (14 - 18)  SpO2: 95% (10-05-17 @ 11:48) (95% - 100%)  Wt(kg): --    Height (cm): 144.78 (10-04 @ 22:30)  Weight (kg): 75 (10-04 @ 22:30)  BMI (kg/m2): 35.8 (10-04 @ 22:30)  BSA (m2): 1.66 (10-04 @ 22:30)    Constitutional: well developed, normal appearance, well groomed, well nourished, no deformities and no acute distress.   Eyes: the conjunctiva exhibited no abnormalities and the eyelids demonstrated no xanthelasmas.   HEENT: normal oral mucosa, no oral pallor and no oral cyanosis.   Neck: normal jugular venous A waves present, normal jugular venous V waves present and no jugular venous blum A waves.   Pulmonary: no respiratory distress, normal respiratory rhythm and effort; decreased BS at bases with mild fine rales.   Cardiovascular: tachy irreg; 2/6SM at LLSB  Abdomen: soft, non-tender, no hepato-splenomegaly and no abdominal mass palpated.   Musculoskeletal: the gait could not be assessed..   Extremities: no clubbing of the fingernails, no localized cyanosis, no petechial hemorrhages and no ischemic changes.   Skin: +venous stasis, 2+LE edema  Psychiatric: oriented to person, place, and time, the affect was normal, the mood was normal and not feeling anxious.     LABS:   --------  10-05    145  |  107  |  27<H>  ----------------------------<  111<H>  3.2<L>   |  27  |  0.71    Ca    8.3<L>      05 Oct 2017 07:29  Mg     2.6     10-05    TPro  7.5  /  Alb  2.5<L>  /  TBili  1.7<H>  /  DBili  x   /  AST  226<H>  /  ALT  163<H>  /  AlkPhos  353<H>  10-04                         10.2   22.1  )-----------( 357      ( 05 Oct 2017 07:30 )             30.2       10-04 @ 15:48 BNP: 3211 pg/mL    10-05 @ 07:29 CPK total:--, CKMB --, Troponin I - 1.030 ng/mL<H>  10-05 @ 00:22 CPK total:--, CKMB --, Troponin I - 1.190 ng/mL<H>  10-04 @ 15:48 CPK total:--, CKMB --, Troponin I - 1.490 ng/mL<H>          RADIOLOGY:  -----------------  ECG/tele:  Aflutter 90-120s with rate-related changes

## 2017-10-05 NOTE — PHYSICAL THERAPY INITIAL EVALUATION ADULT - CRITERIA FOR SKILLED THERAPEUTIC INTERVENTIONS
therapy frequency/predicted duration of therapy intervention/functional limitations in following categories/anticipated discharge recommendation/risk reduction/prevention/short term rehab pending functional assessment./impairments found

## 2017-10-05 NOTE — CONSULT NOTE ADULT - SUBJECTIVE AND OBJECTIVE BOX
Infectious Diseases - Attending at Dr. Willard    HPI:  76yo F w/ pmh inclusive of htn, obesity, a. flutter on xarelto, c/o BLE edema w/ weeping X 1 week, intermittent dyspnea on exertion- patient is normally on lasix and yet two days ago styated to get pain and erythema with increased swelling. she denies fever . also complain of of intermittent abdominal pain with no nausea vomiting or diarrhea (04 Oct 2017 19:56)      PAST MEDICAL & SURGICAL HISTORY:  Bronchitis  Obesity (BMI 30-39.9)  HTN (hypertension)  Cyst, ovarian  S/P dilatation and curettage: in   angiogram of RLE to R/O DVT-  ~ 3-4 years ago- Negative test: angiogram of RLE to R/O DVT-  ~ 3-4 years ago- Negative test      Allergies    No Known Allergies    Intolerances        FAMILY HISTORY:  No pertinent family history in first degree relatives      SOCIAL HISTORY:    REVIEW OF SYSTEMS:    Constitutional: No fever, weight loss or fatigue  Eyes: No eye pain, visual disturbances, or discharge  ENT:  No difficulty hearing, tinnitus, vertigo; No sinus or throat pain  Neck: No pain or stiffness  Respiratory: No cough, wheezing, chills or hemoptysis  Cardiovascular: No chest pain, palpitations, shortness of breath, dizziness or leg swelling  Gastrointestinal: No abdominal or epigastric pain. No nausea, vomiting or hematemesis; No diarrhea or constipation. No melena or hematochezia.  Genitourinary: No dysuria, frequency, hematuria or incontinence  Neurological: No headaches, memory loss, loss of strength, numbness or tremors  Skin: No itching, burning, rashes or lesions       MEDICATIONS  (STANDING):  furosemide   Injectable 40 milliGRAM(s) IV Push every 12 hours  metoprolol succinate ER 25 milliGRAM(s) Oral daily  piperacillin/tazobactam IVPB. 3.375 Gram(s) IV Intermittent every 8 hours  rivaroxaban 20 milliGRAM(s) Oral every 24 hours  vancomycin  IVPB 1000 milliGRAM(s) IV Intermittent every 12 hours    MEDICATIONS  (PRN):      Vital Signs Last 24 Hrs  T(C): 37.4 (05 Oct 2017 17:35), Max: 37.4 (05 Oct 2017 17:35)  T(F): 99.4 (05 Oct 2017 17:35), Max: 99.4 (05 Oct 2017 17:35)  HR: 95 (05 Oct 2017 17:35) (95 - 112)  BP: 97/57 (05 Oct 2017 17:35) (97/57 - 105/52)  BP(mean): --  RR: 16 (05 Oct 2017 17:35) (16 - 18)  SpO2: 98% (05 Oct 2017 17:35) (95% - 98%)    PHYSICAL EXAM:    Constitutional: NAD, well-groomed, well-developed  HEENT: PERRLA, EOMI, Normal Hearing, MMM  Neck: No LAD, No JVD  Back: Normal spine flexure, No CVA tenderness  Respiratory: CTAB/L  Cardiovascular: S1 and S2, RRR, no M/G/R  Gastrointestinal: BS+, soft, NT/ND  Extremities: No peripheral edema  Vascular: 2+ peripheral pulses  Neurological: A/O x 3, no focal deficits  Skin: No rashes      LABS:                        10.2   22.1  )-----------( 357      ( 05 Oct 2017 07:30 )             30.2     10-05    145  |  107  |  27<H>  ----------------------------<  111<H>  3.2<L>   |  27  |  0.71    Ca    8.3<L>      05 Oct 2017 07:29  Mg     2.6     10-05    TPro  7.5  /  Alb  2.5<L>  /  TBili  1.7<H>  /  DBili  x   /  AST  226<H>  /  ALT  163<H>  /  AlkPhos  353<H>  10-04      Urinalysis Basic - ( 05 Oct 2017 01:24 )    Color: Yellow / Appearance: Clear / S.005 / pH: x  Gluc: x / Ketone: Negative  / Bili: Negative / Urobili: Negative mg/dL   Blood: x / Protein: Negative mg/dL / Nitrite: Negative   Leuk Esterase: Negative / RBC: x / WBC x   Sq Epi: x / Non Sq Epi: x / Bacteria: x        MICROBIOLOGY:  RECENT CULTURES:        RADIOLOGY & ADDITIONAL STUDIES: Infectious Diseases - Attending at Dr. Willard    HPI:  76yo F w/ pmh inclusive of htn, obesity, a. flutter on xarelto, c/o BLE edema w/ weeping X 1 week, intermittent dyspnea on exertion- patient is normally on lasix and yet two days ago started to get pain and erythema with increased swelling. she denies fever . also complain of of intermittent abdominal pain with no nausea vomiting or diarrhea (04 Oct 2017 19:56)  C/o of pain in left leg      PAST MEDICAL & SURGICAL HISTORY:  Bronchitis  Obesity (BMI 30-39.9)  HTN (hypertension)  Cyst, ovarian  S/P dilatation and curettage: in   angiogram of RLE to R/O DVT-  ~ 3-4 years ago- Negative test: angiogram of RLE to R/O DVT-  ~ 3-4 years ago- Negative test      Allergies    No Known Allergies    Intolerances        FAMILY HISTORY:  No pertinent family history in first degree relatives      SOCIAL HISTORY: no smoking    REVIEW OF SYSTEMS:    Constitutional: No fever, weight loss or fatigue  Eyes: No eye pain, visual disturbances, or discharge  ENT:  No difficulty hearing, tinnitus, vertigo; No sinus or throat pain  Neck: No pain or stiffness  Respiratory: No cough, wheezing, chills or hemoptysis  Cardiovascular: No chest pain, palpitations, shortness of breath, dizziness or leg swelling  Gastrointestinal: No abdominal or epigastric pain. No nausea, vomiting or hematemesis; No diarrhea or constipation. No melena or hematochezia.  Genitourinary: No dysuria, frequency, hematuria or incontinence  Neurological: No headaches, memory loss, loss of strength, numbness or tremors  Skin: LLE pain and swelling      MEDICATIONS  (STANDING):  furosemide   Injectable 40 milliGRAM(s) IV Push every 12 hours  metoprolol succinate ER 25 milliGRAM(s) Oral daily  piperacillin/tazobactam IVPB. 3.375 Gram(s) IV Intermittent every 8 hours  rivaroxaban 20 milliGRAM(s) Oral every 24 hours  vancomycin  IVPB 1000 milliGRAM(s) IV Intermittent every 12 hours    MEDICATIONS  (PRN):      Vital Signs Last 24 Hrs  T(C): 37.4 (05 Oct 2017 17:35), Max: 37.4 (05 Oct 2017 17:35)  T(F): 99.4 (05 Oct 2017 17:35), Max: 99.4 (05 Oct 2017 17:35)  HR: 95 (05 Oct 2017 17:35) (95 - 112)  BP: 97/57 (05 Oct 2017 17:35) (97/57 - 105/52)  BP(mean): --  RR: 16 (05 Oct 2017 17:35) (16 - 18)  SpO2: 98% (05 Oct 2017 17:35) (95% - 98%)    PHYSICAL EXAM:    Constitutional: NAD, well-groomed, well-developed  HEENT: PERRLA, EOMI, Normal Hearing, MMM  Neck: No LAD, No JVD  Back: Normal spine flexure, No CVA tenderness  Respiratory: CTAB/L  Cardiovascular: S1 and S2, RRR, no M/G/R  Gastrointestinal: BS+, soft, NT/ND  Extremities: No peripheral edema  Vascular: 2+ peripheral pulses  Neurological: A/O x 3, no focal deficits  Skin: LLE swelling ,with skin peeling off with some weeping, leg is very tender ,pt is not allowing to lift it up       LABS:                        10.2   22.1  )-----------( 357      ( 05 Oct 2017 07:30 )             30.2     10-05    145  |  107  |  27<H>  ----------------------------<  111<H>  3.2<L>   |  27  |  0.71    Ca    8.3<L>      05 Oct 2017 07:29  Mg     2.6     10-05    TPro  7.5  /  Alb  2.5<L>  /  TBili  1.7<H>  /  DBili  x   /  AST  226<H>  /  ALT  163<H>  /  AlkPhos  353<H>  10-04      Urinalysis Basic - ( 05 Oct 2017 01:24 )    Color: Yellow / Appearance: Clear / S.005 / pH: x  Gluc: x / Ketone: Negative  / Bili: Negative / Urobili: Negative mg/dL   Blood: x / Protein: Negative mg/dL / Nitrite: Negative   Leuk Esterase: Negative / RBC: x / WBC x   Sq Epi: x / Non Sq Epi: x / Bacteria: x        MICROBIOLOGY:  RECENT CULTURES:          RADIOLOGY & ADDITIONAL STUDIES:

## 2017-10-05 NOTE — PROGRESS NOTE ADULT - SUBJECTIVE AND OBJECTIVE BOX
Patient is a 77y old  Female who presents with a chief complaint of left leg swelling and weeping (04 Oct 2017 19:56)      OVERNIGHT EVENTS: no specific complaints. feeling much better. +generalized weakness    REVIEW OF SYSTEMS: denies chest pain/SOB, diaphoresis, no F/C, cough, dizziness, headache, blurry vision, nausea, vomiting, abdominal pain. All others review of systems negative     MEDICATIONS  (STANDING):  furosemide   Injectable 40 milliGRAM(s) IV Push every 12 hours  metoprolol succinate ER 25 milliGRAM(s) Oral daily  piperacillin/tazobactam IVPB. 3.375 Gram(s) IV Intermittent every 8 hours  rivaroxaban 20 milliGRAM(s) Oral every 24 hours  vancomycin  IVPB 1000 milliGRAM(s) IV Intermittent every 12 hours    MEDICATIONS  (PRN):      Allergies    No Known Allergies    Intolerances        T(F): 98.8 (10-05-17 @ 11:48), Max: 99.4 (10-04-17 @ 22:30)  HR: 108 (10-05-17 @ 15:05) (103 - 127)  BP: 98/55 (10-05-17 @ 15:05) (98/55 - 109/64)  RR: 18 (10-05-17 @ 15:05) (14 - 18)  SpO2: 97% (10-05-17 @ 15:05) (95% - 100%)  Wt(kg): --    PHYSICAL EXAM:  GENERAL: NAD, well-groomed, well-developed  HEAD:  Atraumatic, Normocephalic  EYES: EOMI, PERRLA, conjunctiva and sclera clear  ENMT: No tonsillar erythema, exudates, or enlargement; Moist mucous membranes, Good dentition, No lesions  NECK: Supple, No JVD, Normal thyroid  NERVOUS SYSTEM:  Alert & Oriented X3, Good concentration; Motor Strength 4/5 B/L upper and lower extremities; DTRs 2+ intact and symmetric  CHEST/LUNG: Clear to percussion bilaterally; No rales, rhonchi, wheezing, or rubs BL  HEART: irreg/irreg  ABDOMEN: Soft, Nontender, Nondistended; Bowel sounds present  EXTREMITIES:  2+ Peripheral edema BL LE L>R, +chronic venous stasis changes, hyperpigmented skin  LYMPH: No lymphadenopathy noted  SKIN: No rashes or lesions    LABS:                        10.2   22.1  )-----------( 357      ( 05 Oct 2017 07:30 )             30.2     10    145  |  107  |  27<H>  ----------------------------<  111<H>  3.2<L>   |  27  |  0.71    Ca    8.3<L>      05 Oct 2017 07:29  Mg     2.6     10-05    TPro  7.5  /  Alb  2.5<L>  /  TBili  1.7<H>  /  DBili  x   /  AST  226<H>  /  ALT  163<H>  /  AlkPhos  353<H>  10      Urinalysis Basic - ( 05 Oct 2017 01:24 )    Color: Yellow / Appearance: Clear / S.005 / pH: x  Gluc: x / Ketone: Negative  / Bili: Negative / Urobili: Negative mg/dL   Blood: x / Protein: Negative mg/dL / Nitrite: Negative   Leuk Esterase: Negative / RBC: x / WBC x   Sq Epi: x / Non Sq Epi: x / Bacteria: x      Cultures;   CAPILLARY BLOOD GLUCOSE        Lipid panel:     CARDIAC MARKERS ( 05 Oct 2017 07:29 )  1.030 ng/mL / x     / x     / x     / x      CARDIAC MARKERS ( 05 Oct 2017 00:22 )  1.190 ng/mL / x     / x     / x     / x      CARDIAC MARKERS ( 04 Oct 2017 15:48 )  1.490 ng/mL / x     / 230 U/L / x     / 4.1 ng/mL        RADIOLOGY & ADDITIONAL TESTS:    Imaging Personally Reviewed:  [x ] YES    CXR- < from: Xray Chest 1 View AP/PA. (10.04.17 @ 18:52) >  No focal air space opacities.    CT ABD-  < from: CT Abdomen and Pelvis w/ IV Cont (10.04.17 @ 20:14) >  No biliary ductal dilatation.  Colonic diverticulosis without CT evidence of acute diverticulitis.    TTE-  1. Mitral annular calcification, otherwise normal mitral  valve. Mild mitral regurgitation.  2. Mildly dilated left atrium.  LA volume index = 35 cc/m2.  3. Normal left ventricular internal dimensions and wall  thicknesses.  4. Normal left ventricular systolic function. No segmental  wall motion abnormalities.  5. Normal right ventricular size and function.  6. Estimated right ventricular systolic pressure equals 42  mm Hg, assuming right atrial pressure equals 10 mm Hg,  consistent with mild pulmonary hypertension.    Consultant(s) Notes Reviewed:  [x ] YES     Care Discussed with [x ] Consultants [X ] Patient [ ] Family  [x ]    [x ]  Other; RN

## 2017-10-05 NOTE — PHYSICAL THERAPY INITIAL EVALUATION ADULT - MODIFIED CLINICAL TEST OF SENSORY INTEGRATION IN BALANCE TEST
Barthel Index: Feeding Score _10__, Bathing Score __0_, Grooming Score _0__, Dressing Score 0___, Bowels Score __10_, Bladder Score __10_, Toilet Score __0_, Transfers Score 5___, Mobility Score _0__, Stairs Score 0___,     Total Score ___35

## 2017-10-06 LAB
ANION GAP SERPL CALC-SCNC: 11 MMOL/L — SIGNIFICANT CHANGE UP (ref 5–17)
BASOPHILS # BLD AUTO: 0.1 K/UL — SIGNIFICANT CHANGE UP (ref 0–0.2)
BASOPHILS NFR BLD AUTO: 0.2 % — SIGNIFICANT CHANGE UP (ref 0–2)
BUN SERPL-MCNC: 24 MG/DL — HIGH (ref 7–23)
CALCIUM SERPL-MCNC: 8.3 MG/DL — LOW (ref 8.5–10.1)
CHLORIDE SERPL-SCNC: 107 MMOL/L — SIGNIFICANT CHANGE UP (ref 96–108)
CO2 SERPL-SCNC: 28 MMOL/L — SIGNIFICANT CHANGE UP (ref 22–31)
CREAT SERPL-MCNC: 0.83 MG/DL — SIGNIFICANT CHANGE UP (ref 0.5–1.3)
CULTURE RESULTS: NO GROWTH — SIGNIFICANT CHANGE UP
EOSINOPHIL # BLD AUTO: 0.1 K/UL — SIGNIFICANT CHANGE UP (ref 0–0.5)
EOSINOPHIL NFR BLD AUTO: 0.3 % — SIGNIFICANT CHANGE UP (ref 0–6)
GLUCOSE SERPL-MCNC: 104 MG/DL — HIGH (ref 70–99)
HCT VFR BLD CALC: 29.4 % — LOW (ref 34.5–45)
HGB BLD-MCNC: 9.9 G/DL — LOW (ref 11.5–15.5)
LYMPHOCYTES # BLD AUTO: 1.4 K/UL — SIGNIFICANT CHANGE UP (ref 1–3.3)
LYMPHOCYTES # BLD AUTO: 5.6 % — LOW (ref 13–44)
MAGNESIUM SERPL-MCNC: 2.3 MG/DL — SIGNIFICANT CHANGE UP (ref 1.6–2.6)
MCHC RBC-ENTMCNC: 27.8 PG — SIGNIFICANT CHANGE UP (ref 27–34)
MCHC RBC-ENTMCNC: 33.8 GM/DL — SIGNIFICANT CHANGE UP (ref 32–36)
MCV RBC AUTO: 82.3 FL — SIGNIFICANT CHANGE UP (ref 80–100)
MONOCYTES # BLD AUTO: 1.2 K/UL — HIGH (ref 0–0.9)
MONOCYTES NFR BLD AUTO: 5.1 % — SIGNIFICANT CHANGE UP (ref 2–14)
NEUTROPHILS # BLD AUTO: 21.8 K/UL — HIGH (ref 1.8–7.4)
NEUTROPHILS NFR BLD AUTO: 88.8 % — HIGH (ref 43–77)
PLATELET # BLD AUTO: 392 K/UL — SIGNIFICANT CHANGE UP (ref 150–400)
POTASSIUM SERPL-MCNC: 3.5 MMOL/L — SIGNIFICANT CHANGE UP (ref 3.5–5.3)
POTASSIUM SERPL-SCNC: 3.5 MMOL/L — SIGNIFICANT CHANGE UP (ref 3.5–5.3)
RBC # BLD: 3.57 M/UL — LOW (ref 3.8–5.2)
RBC # FLD: 15.1 % — HIGH (ref 11–15)
SODIUM SERPL-SCNC: 146 MMOL/L — HIGH (ref 135–145)
SPECIMEN SOURCE: SIGNIFICANT CHANGE UP
VANCOMYCIN TROUGH SERPL-MCNC: 17.4 UG/ML — SIGNIFICANT CHANGE UP (ref 10–20)
WBC # BLD: 24.5 K/UL — HIGH (ref 3.8–10.5)
WBC # FLD AUTO: 24.5 K/UL — HIGH (ref 3.8–10.5)

## 2017-10-06 PROCEDURE — 99233 SBSQ HOSP IP/OBS HIGH 50: CPT

## 2017-10-06 PROCEDURE — 99232 SBSQ HOSP IP/OBS MODERATE 35: CPT

## 2017-10-06 RX ORDER — IBUPROFEN 200 MG
400 TABLET ORAL ONCE
Qty: 0 | Refills: 0 | Status: COMPLETED | OUTPATIENT
Start: 2017-10-06 | End: 2017-10-06

## 2017-10-06 RX ORDER — OXYCODONE AND ACETAMINOPHEN 5; 325 MG/1; MG/1
2 TABLET ORAL EVERY 6 HOURS
Qty: 0 | Refills: 0 | Status: DISCONTINUED | OUTPATIENT
Start: 2017-10-06 | End: 2017-10-11

## 2017-10-06 RX ADMIN — OXYCODONE AND ACETAMINOPHEN 2 TABLET(S): 5; 325 TABLET ORAL at 14:37

## 2017-10-06 RX ADMIN — Medication 40 MILLIGRAM(S): at 05:49

## 2017-10-06 RX ADMIN — Medication 250 MILLIGRAM(S): at 18:57

## 2017-10-06 RX ADMIN — Medication 250 MILLIGRAM(S): at 05:31

## 2017-10-06 RX ADMIN — PIPERACILLIN AND TAZOBACTAM 25 GRAM(S): 4; .5 INJECTION, POWDER, LYOPHILIZED, FOR SOLUTION INTRAVENOUS at 21:41

## 2017-10-06 RX ADMIN — RIVAROXABAN 20 MILLIGRAM(S): KIT at 18:57

## 2017-10-06 RX ADMIN — Medication 400 MILLIGRAM(S): at 05:49

## 2017-10-06 RX ADMIN — PIPERACILLIN AND TAZOBACTAM 25 GRAM(S): 4; .5 INJECTION, POWDER, LYOPHILIZED, FOR SOLUTION INTRAVENOUS at 06:54

## 2017-10-06 RX ADMIN — OXYCODONE AND ACETAMINOPHEN 2 TABLET(S): 5; 325 TABLET ORAL at 15:07

## 2017-10-06 RX ADMIN — PIPERACILLIN AND TAZOBACTAM 25 GRAM(S): 4; .5 INJECTION, POWDER, LYOPHILIZED, FOR SOLUTION INTRAVENOUS at 14:38

## 2017-10-06 NOTE — PROGRESS NOTE ADULT - PROBLEM SELECTOR PLAN 1
cont vanco and zosyn  c/o of disproportionate leg pain CT leg with iv contrast to r/o deep tissue infeciton as WBC count is still high as well  vanco level

## 2017-10-06 NOTE — PROGRESS NOTE ADULT - ASSESSMENT
· Assessment		  76yo lady with a PMH of HTN, Aflutter on Xarelto; admitted c/o BLE edema w/ weeping X 1 week with difficulty walking, associated with intermittent dyspnea on exertion; patient is normally on lasix and compliant but two days ago developed pain and erythema with increased swelling. She denies fever/chills.  Denied chest pain/palpitations/syncope.  In ER, troponin mildly elevated but found to be in Aflutter with RVR.... HRs to 120-130s.  Given bbs/lasix... feeling much better.  HRs now 90s on tele.  LE doppler negative for DVT.  Of note, pt with similar admission to [facility] 7/2017.... edema NOT 2/2 CHF but 2/2 uncontrolled flutter.  LVEF 60% at that time.  D/c'd home then with medical therapy... bbs and anticoagulation.    -2D echo 7/17 showing   1. Mitral annular calcification, otherwise normal mitral  valve. Mild mitral regurgitation.  2. Mildly dilated left atrium.  LA volume index = 35 cc/m2.  3. Normal left ventricular internal dimensions and wall  thicknesses.  4. Normal left ventricular systolic function. No segmental  wall motion abnormalities.  5. Normal right ventricular size and function.  6. Estimated right ventricular systolic pressure equals 42  mm Hg, assuming right atrial pressure equals 10 mm Hg,  consistent with mild pulmonary hypertension.    -rate control with bbs as tolerated... SBP ~100s. BB not listed as a home medication and daughter to bring in all meds for review since it is unclear what rate control medication is at home.   -cont with AC with Xarelto  -treat cellulitis with abx.. on vanco/zosyn  -when she has completed her abx course and she is euvolemic, will discuss with EPS about ablation therapy for flutter; will need ischemic eval prior

## 2017-10-06 NOTE — PROGRESS NOTE ADULT - ASSESSMENT
76yo female with a PMH of HTN, Aflutter on Xarelto; admitted c/o BLE edema w/ weeping X 1 week with difficulty walking, associated with intermittent dyspnea on exertion; patient is normally on lasix and compliant but two days ago started to get pain and erythema with increased swelling. She denies fever/chills.  Denied chest pain/palpitations/syncope. In ER, troponin mildly elevated but found to be in Aflutter with RVR.... HRs to 120-130s.  Given bbs/lasix. LE doppler negative for DVT. ECG/tele:  Aflutter 90-120s with rate-related changes. Also noted with Cellulitis of left lower extremity. started on IV Zosyn, vanco. Evaluated by ID. bcx No growth to date.

## 2017-10-06 NOTE — PROGRESS NOTE ADULT - SUBJECTIVE AND OBJECTIVE BOX
Patient is a 77y old  Female who presents with a chief complaint of left leg swelling and weeping (04 Oct 2017 19:56)      OVERNIGHT EVENTS: no overnight events     REVIEW OF SYSTEMS: denies chest pain/SOB, diaphoresis, no F/C, cough, dizziness, headache, blurry vision, nausea, vomiting, abdominal pain. All others review of systems negative     MEDICATIONS  (STANDING):  furosemide   Injectable 40 milliGRAM(s) IV Push every 12 hours  metoprolol succinate ER 25 milliGRAM(s) Oral daily  piperacillin/tazobactam IVPB. 3.375 Gram(s) IV Intermittent every 8 hours  rivaroxaban 20 milliGRAM(s) Oral every 24 hours  vancomycin  IVPB 1000 milliGRAM(s) IV Intermittent every 12 hours    MEDICATIONS  (PRN):  oxyCODONE    5 mG/acetaminophen 325 mG 2 Tablet(s) Oral every 6 hours PRN Moderate Pain (4 - 6)      Allergies    No Known Allergies    Intolerances        T(F): 98.8 (10-06-17 @ 12:15), Max: 99.4 (10-05-17 @ 17:35)  HR: 89 (10-06-17 @ 12:15) (87 - 108)  BP: 87/43 (10-06-17 @ 12:15) (87/43 - 101/56)  RR: 16 (10-06-17 @ 12:15) (16 - 18)  SpO2: 98% (10-06-17 @ 12:15) (97% - 98%)  Wt(kg): --    PHYSICAL EXAM:  GENERAL: NAD, well-groomed, well-developed  HEAD:  Atraumatic, Normocephalic  EYES: EOMI, PERRLA, conjunctiva and sclera clear  ENMT: No tonsillar erythema, exudates, or enlargement; Moist mucous membranes, Good dentition, No lesions  NECK: Supple, No JVD, Normal thyroid  NERVOUS SYSTEM:  Alert & Oriented X3, Good concentration; Motor Strength 4/5 B/L upper and lower extremities; DTRs 2+ intact and symmetric  CHEST/LUNG: Clear to percussion bilaterally; No rales, rhonchi, wheezing, or rubs BL  HEART: irreg/irreg  ABDOMEN: Soft, Nontender, Nondistended; Bowel sounds present  EXTREMITIES:  2+ Peripheral edema BL LE L>R, +chronic venous stasis changes, hyperpigmented skin  LYMPH: No lymphadenopathy noted  SKIN:  +chronic venous stasis changes, hyperpigmented skin      LABS:                        9.9    24.5  )-----------( 392      ( 06 Oct 2017 06:48 )             29.4     10    146<H>  |  107  |  24<H>  ----------------------------<  104<H>  3.5   |  28  |  0.83    Ca    8.3<L>      06 Oct 2017 06:48  Mg     2.3     10    TPro  7.5  /  Alb  2.5<L>  /  TBili  1.7<H>  /  DBili  x   /  AST  226<H>  /  ALT  163<H>  /  AlkPhos  353<H>  10      Urinalysis Basic - ( 05 Oct 2017 01:24 )    Color: Yellow / Appearance: Clear / S.005 / pH: x  Gluc: x / Ketone: Negative  / Bili: Negative / Urobili: Negative mg/dL   Blood: x / Protein: Negative mg/dL / Nitrite: Negative   Leuk Esterase: Negative / RBC: x / WBC x   Sq Epi: x / Non Sq Epi: x / Bacteria: x      Cultures;   Culture - Blood (collected 04 Oct 2017 23:42)  Source: .Blood Blood-Peripheral  Preliminary Report (06 Oct 2017 01:02):    No growth to date.    Culture - Blood (collected 04 Oct 2017 23:23)  Source: .Blood Blood-Peripheral  Preliminary Report (06 Oct 2017 01:02):    No growth to date.    CAPILLARY BLOOD GLUCOSE        Lipid panel:     CARDIAC MARKERS ( 05 Oct 2017 15:26 )  .915 ng/mL / x     / x     / x     / x      CARDIAC MARKERS ( 05 Oct 2017 07:29 )  1.030 ng/mL / x     / x     / x     / x      CARDIAC MARKERS ( 05 Oct 2017 00:22 )  1.190 ng/mL / x     / x     / x     / x      CARDIAC MARKERS ( 04 Oct 2017 15:48 )  1.490 ng/mL / x     / 230 U/L / x     / 4.1 ng/mL    RADIOLOGY & ADDITIONAL TESTS:    Imaging Personally Reviewed:  [x ] YES    CXR- < from: Xray Chest 1 View AP/PA. (10.04.17 @ 18:52) >  No focal air space opacities.    CT ABD-  < from: CT Abdomen and Pelvis w/ IV Cont (10.04.17 @ 20:14) >  No biliary ductal dilatation.  Colonic diverticulosis without CT evidence of acute diverticulitis.    TTE-  1. Mitral annular calcification, otherwise normal mitral  valve. Mild mitral regurgitation.  2. Mildly dilated left atrium.  LA volume index = 35 cc/m2.  3. Normal left ventricular internal dimensions and wall  thicknesses.  4. Normal left ventricular systolic function. No segmental  wall motion abnormalities.  5. Normal right ventricular size and function.  6. Estimated right ventricular systolic pressure equals 42  mm Hg, assuming right atrial pressure equals 10 mm Hg,  consistent with mild pulmonary hypertension.    Consultant(s) Notes Reviewed:  [x ] YES     Care Discussed with [x ] Consultants [X ] Patient [ ] Family  [x ]    [x ]  Other; RN

## 2017-10-06 NOTE — PROGRESS NOTE ADULT - SUBJECTIVE AND OBJECTIVE BOX
Patient is a 77y old  Female who presents with a chief complaint of left leg swelling and weeping (04 Oct 2017 19:56)      INTERVAL HPI / OVERNIGHT EVENTS: left leg pain and swelling    MEDICATIONS  (STANDING):  furosemide   Injectable 40 milliGRAM(s) IV Push every 12 hours  metoprolol succinate ER 25 milliGRAM(s) Oral daily  piperacillin/tazobactam IVPB. 3.375 Gram(s) IV Intermittent every 8 hours  rivaroxaban 20 milliGRAM(s) Oral every 24 hours  vancomycin  IVPB 1000 milliGRAM(s) IV Intermittent every 12 hours    MEDICATIONS  (PRN):  oxyCODONE    5 mG/acetaminophen 325 mG 2 Tablet(s) Oral every 6 hours PRN Moderate Pain (4 - 6)      Vital Signs Last 24 Hrs  Tmax: afebrile  HR: 88 (07 Oct 2017 16:48) (87 - 94)  BP: 112/62 (07 Oct 2017 16:48) (93/44 - 112/62)  BP(mean): --  RR: 22 (07 Oct 2017 16:48) (18 - 22)  SpO2: 98% (07 Oct 2017 16:48) (97% - 98%)    PHYSICAL EXAM:  General :NAD  Constitutional:  well-groomed, well-developed  Respiratory: CTAB/L  Cardiovascular: S1 and S2, RRR, no M/G/R  Gastrointestinal: BS+, soft, NT/ND  Extremities: No peripheral edema  Vascular: 2+ peripheral pulses  Skin: No rashes      LABS:  WBC 22--> 24  CR 0.83            MICROBIOLOGY:  RECENT CULTURES:  10-05 .Urine Clean Catch (Midstream) XXXX XXXX   No growth    10-04 .Blood Blood-Peripheral XXXX XXXX   No growth to date.    10-04 .Blood Blood-Peripheral XXXX XXXX   No growth to date.          RADIOLOGY & ADDITIONAL STUDIES:

## 2017-10-06 NOTE — PROGRESS NOTE ADULT - SUBJECTIVE AND OBJECTIVE BOX
Patient is a 77y old  Female who presents with a chief complaint of left leg swelling and weeping (04 Oct 2017 19:56)  HPI:  78yo lady with a PMH of HTN, Aflutter on Xarelto; admitted c/o BLE edema w/ weeping X 1 week with difficulty walking, associated with intermittent dyspnea on exertion; patient is normally on lasix  but two days ago started to get pain and erythema with increased swelling. She denies fever/chills.  Denied chest pain/palpitations/syncope.  In ER, troponin mildly elevated but found to be in Aflutter with RVR.... HRs to 120-130s.  Given bbs/lasix... feeling much better.  HRs now 90s on tele.  LE doppler negative for DVT.    Of note, pt with similar admission to [facility] 7/2017.... edema NOT 2/2 CHF but 2/2 uncontrolled flutter.  LVEF 60% at that time.  D/c'd home then with medical therapy... bbs and anticoagulation.      PAST MEDICAL & SURGICAL HISTORY:  Bronchitis  Obesity (BMI 30-39.9)  HTN (hypertension)  Cyst, ovarian  S/P dilatation and curettage: in 1994  angiogram of RLE to R/O DVT-  ~ 3-4 years ago- Negative test: angiogram of RLE to R/O DVT-  ~ 3-4 years ago- Negative test    Home Medications:  Coricidin 325 mg-2 mg oral tablet:  (04 Oct 2017 18:57)  DuoNeb 0.5 mg-2.5 mg/3 mL inhalation solution:  (04 Oct 2017 18:57)  Xarelto 20 mg oral tablet: 1 tab(s) orally once a day (in the evening) (04 Oct 2017 18:57)      INTERVAL HISTORY:  	  MEDICATIONS:  MEDICATIONS  (STANDING):  furosemide   Injectable 40 milliGRAM(s) IV Push every 12 hours  metoprolol succinate ER 25 milliGRAM(s) Oral daily  piperacillin/tazobactam IVPB. 3.375 Gram(s) IV Intermittent every 8 hours  rivaroxaban 20 milliGRAM(s) Oral every 24 hours  vancomycin  IVPB 1000 milliGRAM(s) IV Intermittent every 12 hours    MEDICATIONS  (PRN):  oxyCODONE    5 mG/acetaminophen 325 mG 2 Tablet(s) Oral every 6 hours PRN Moderate Pain (4 - 6)      Vitals:  T(F): 97.8 (10-06-17 @ 04:48), Max: 99.4 (10-05-17 @ 17:35)  HR: 107 (10-06-17 @ 04:48) (87 - 108)  BP: 101/56 (10-06-17 @ 04:48) (97/57 - 101/56)  RR: 16 (10-06-17 @ 04:48) (16 - 18)  SpO2: 98% (10-06-17 @ 04:48) (95% - 98%)  Wt(kg): --    10-05 @ 07:01  -  10-06 @ 07:00  --------------------------------------------------------  IN:    Oral Fluid: 720 mL    Solution: 100 mL  Total IN: 820 mL    OUT:    Voided: 2 mL  Total OUT: 2 mL    Total NET: 818 mL      PHYSICAL EXAM:  Neuro: Awake, responsive  CV: S1 S2 IRReg, Aflutter  Lungs: CTABL anteriorly. dim bibas  GI: Soft, BS +, ND, NT  Extremities: 3+ LE edema L>R    TELEMETRY: A-flutter 's  	      DIAGNOSTIC TESTING:    [ ] Echocardiogram:  < from: Transthoracic Echocardiogram (07.19.17 @ 14:49) >  Patient name: MITZY ARELLANO  YOB: 1940   Age: 76 (F)   MR#: [id number]  Study Date: 7/19/2017  [facility]ина [facility]  Study quality: Technically Fair  Referring Physician: Noam Sow MD  Blood Pressure: 130/61 mmHg  Height: 150 cm  Weight: 78 kg  BSA: 1.7 m2  ------------------------------------------------------------------------  PROCEDURE: Transthoracic echocardiogram with 2-D, M-Mode  and complete spectral and color flow Doppler.  INDICATION: Atypical atrial flutter (I48.4)  ------------------------------------------------------------------------  DIMENSIONS:  Dimensions:     Normal Values:  LA:     3.2 cm    2.0 - 4.0 cm  Ao:     2.6 cm    2.0 - 3.8 cm  SEPTUM: 0.6 cm    0.6 - 1.2 cm  PWT:    0.6 cm    0.6 - 1.1 cm  LVIDd:  3.9 cm    3.0 - 5.6 cm  LVIDs:  2.5 cm    1.8 - 4.0 cm  Derived Variables:  LVMI: 36 g/m2  RWT: 0.30  Fractional short: 36 %  Ejection Fraction (Teicholtz): 66 %  ------------------------------------------------------------------------  OBSERVATIONS:  Mitral Valve: Mitral annular calcification, otherwise  normal mitral valve. Mild mitral regurgitation.  Aortic Root: Normal aortic root.  Aortic Valve: Calcified trileaflet aortic valve with normal  opening.  Left Atrium: Mildly dilated left atrium.  LA volume index =  35 cc/m2.  Left Ventricle: Normal left ventricular systolic function.  No segmental wall motion abnormalities. Normal left  ventricular internal dimensions and wall thicknesses.  Right Heart: Mild right atrial enlargement. Normal right  ventricular size and function. Normal tricuspid valve.  Mild-moderate tricuspid regurgitation. Normal pulmonic  valve.  Mild pulmonic regurgitation.  Pericardium/PleuraNormal pericardium with no pericardial  effusion.  Hemodynamic: Estimated right ventricular systolic pressure  equals 42 mm Hg, assuming right atrial pressure equals 10  mm Hg, consistent with mild pulmonary hypertension.  ------------------------------------------------------------------------  CONCLUSIONS:  1. Mitral annular calcification, otherwise normal mitral  valve. Mild mitral regurgitation.  2. Mildly dilated left atrium.  LA volume index = 35 cc/m2.  3. Normal left ventricular internal dimensions and wall  thicknesses.  4. Normal left ventricular systolic function. No segmental  wall motion abnormalities.  5. Normal right ventricular size and function.  6. Estimated right ventricular systolic pressure equals 42  mm Hg, assuming right atrial pressure equals 10 mm Hg,  consistent with mild pulmonary hypertension.  ------------------------------------------------------------------------  Confirmed on  7/19/2017 - 15:46:42 by Ángel Green M.D.  ------------------------------------------------------------------------    < end of copied text >    OTHER: 	    LABS:	 	    CARDIAC MARKERS:  Troponin I, Serum: .915 ng/mL (10-05 @ 15:26)  Troponin I, Serum: 1.030 ng/mL (10-05 @ 07:29)  Troponin I, Serum: 1.190 ng/mL (10-05 @ 00:22)  Troponin I, Serum: 1.490 ng/mL (10-04 @ 15:48)                        9.9    24.5  )-----------( 392      ( 06 Oct 2017 06:48 )             29.4 ,                       10.2   22.1  )-----------( 357      ( 05 Oct 2017 07:30 )             30.2 ,                       11.0   23.8  )-----------( 344      ( 04 Oct 2017 15:48 )             32.5   10-06    146<H>  |  107  |  24<H>  ----------------------------<  104<H>  3.5   |  28  |  0.83    Ca    8.3<L>      06 Oct 2017 06:48  Mg     2.3     10-06    TPro  7.5  /  Alb  2.5<L>  /  TBili  1.7<H>  /  DBili  x   /  AST  226<H>  /  ALT  163<H>  /  AlkPhos  353<H>  10-04    proBNP: Serum Pro-Brain Natriuretic Peptide: 3211 pg/mL (10-04 @ 15:48)       Culture - Blood (collected 04 Oct 2017 23:42)  Source: .Blood Blood-Peripheral  Preliminary Report (06 Oct 2017 01:02):    No growth to date.    Culture - Blood (collected 04 Oct 2017 23:23)  Source: .Blood Blood-Peripheral  Preliminary Report (06 Oct 2017 01:02):    No growth to date.

## 2017-10-07 DIAGNOSIS — D72.829 ELEVATED WHITE BLOOD CELL COUNT, UNSPECIFIED: ICD-10-CM

## 2017-10-07 LAB
ANION GAP SERPL CALC-SCNC: 11 MMOL/L — SIGNIFICANT CHANGE UP (ref 5–17)
BASOPHILS # BLD AUTO: 0.1 K/UL — SIGNIFICANT CHANGE UP (ref 0–0.2)
BASOPHILS NFR BLD AUTO: 0.3 % — SIGNIFICANT CHANGE UP (ref 0–2)
BUN SERPL-MCNC: 27 MG/DL — HIGH (ref 7–23)
CALCIUM SERPL-MCNC: 8.2 MG/DL — LOW (ref 8.5–10.1)
CHLORIDE SERPL-SCNC: 105 MMOL/L — SIGNIFICANT CHANGE UP (ref 96–108)
CO2 SERPL-SCNC: 27 MMOL/L — SIGNIFICANT CHANGE UP (ref 22–31)
CREAT SERPL-MCNC: 0.71 MG/DL — SIGNIFICANT CHANGE UP (ref 0.5–1.3)
EOSINOPHIL # BLD AUTO: 0.1 K/UL — SIGNIFICANT CHANGE UP (ref 0–0.5)
EOSINOPHIL NFR BLD AUTO: 0.4 % — SIGNIFICANT CHANGE UP (ref 0–6)
GLUCOSE SERPL-MCNC: 103 MG/DL — HIGH (ref 70–99)
HCT VFR BLD CALC: 27.3 % — LOW (ref 34.5–45)
HGB BLD-MCNC: 9.4 G/DL — LOW (ref 11.5–15.5)
LYMPHOCYTES # BLD AUTO: 1.3 K/UL — SIGNIFICANT CHANGE UP (ref 1–3.3)
LYMPHOCYTES # BLD AUTO: 5.7 % — LOW (ref 13–44)
MCHC RBC-ENTMCNC: 28.1 PG — SIGNIFICANT CHANGE UP (ref 27–34)
MCHC RBC-ENTMCNC: 34.6 GM/DL — SIGNIFICANT CHANGE UP (ref 32–36)
MCV RBC AUTO: 81.3 FL — SIGNIFICANT CHANGE UP (ref 80–100)
MONOCYTES # BLD AUTO: 1.1 K/UL — HIGH (ref 0–0.9)
MONOCYTES NFR BLD AUTO: 4.7 % — SIGNIFICANT CHANGE UP (ref 2–14)
NEUTROPHILS # BLD AUTO: 19.9 K/UL — HIGH (ref 1.8–7.4)
NEUTROPHILS NFR BLD AUTO: 88.9 % — HIGH (ref 43–77)
PLATELET # BLD AUTO: 455 K/UL — HIGH (ref 150–400)
POTASSIUM SERPL-MCNC: 3.7 MMOL/L — SIGNIFICANT CHANGE UP (ref 3.5–5.3)
POTASSIUM SERPL-SCNC: 3.7 MMOL/L — SIGNIFICANT CHANGE UP (ref 3.5–5.3)
RBC # BLD: 3.36 M/UL — LOW (ref 3.8–5.2)
RBC # FLD: 15.4 % — HIGH (ref 11–15)
SODIUM SERPL-SCNC: 143 MMOL/L — SIGNIFICANT CHANGE UP (ref 135–145)
WBC # BLD: 22.9 K/UL — HIGH (ref 3.8–10.5)
WBC # FLD AUTO: 22.9 K/UL — HIGH (ref 3.8–10.5)

## 2017-10-07 PROCEDURE — 99233 SBSQ HOSP IP/OBS HIGH 50: CPT

## 2017-10-07 PROCEDURE — 99232 SBSQ HOSP IP/OBS MODERATE 35: CPT

## 2017-10-07 RX ADMIN — PIPERACILLIN AND TAZOBACTAM 25 GRAM(S): 4; .5 INJECTION, POWDER, LYOPHILIZED, FOR SOLUTION INTRAVENOUS at 15:13

## 2017-10-07 RX ADMIN — RIVAROXABAN 20 MILLIGRAM(S): KIT at 17:29

## 2017-10-07 RX ADMIN — OXYCODONE AND ACETAMINOPHEN 2 TABLET(S): 5; 325 TABLET ORAL at 17:10

## 2017-10-07 RX ADMIN — Medication 250 MILLIGRAM(S): at 18:01

## 2017-10-07 RX ADMIN — OXYCODONE AND ACETAMINOPHEN 2 TABLET(S): 5; 325 TABLET ORAL at 03:45

## 2017-10-07 RX ADMIN — OXYCODONE AND ACETAMINOPHEN 2 TABLET(S): 5; 325 TABLET ORAL at 09:23

## 2017-10-07 RX ADMIN — Medication 40 MILLIGRAM(S): at 18:01

## 2017-10-07 RX ADMIN — OXYCODONE AND ACETAMINOPHEN 2 TABLET(S): 5; 325 TABLET ORAL at 08:58

## 2017-10-07 RX ADMIN — Medication 250 MILLIGRAM(S): at 05:10

## 2017-10-07 RX ADMIN — OXYCODONE AND ACETAMINOPHEN 2 TABLET(S): 5; 325 TABLET ORAL at 03:09

## 2017-10-07 RX ADMIN — OXYCODONE AND ACETAMINOPHEN 2 TABLET(S): 5; 325 TABLET ORAL at 17:58

## 2017-10-07 RX ADMIN — PIPERACILLIN AND TAZOBACTAM 25 GRAM(S): 4; .5 INJECTION, POWDER, LYOPHILIZED, FOR SOLUTION INTRAVENOUS at 06:16

## 2017-10-07 RX ADMIN — PIPERACILLIN AND TAZOBACTAM 25 GRAM(S): 4; .5 INJECTION, POWDER, LYOPHILIZED, FOR SOLUTION INTRAVENOUS at 22:35

## 2017-10-07 RX ADMIN — Medication 25 MILLIGRAM(S): at 05:58

## 2017-10-07 NOTE — PROGRESS NOTE ADULT - SUBJECTIVE AND OBJECTIVE BOX
Patient is a 77y old  Female who presents with a chief complaint of left leg swelling and weeping (04 Oct 2017 19:56)      OVERNIGHT EVENTS: no overnight event     REVIEW OF SYSTEMS: denies chest pain/SOB, diaphoresis, no F/C, cough, dizziness, headache, blurry vision, nausea, vomiting, abdominal pain. All others review of systems negative     MEDICATIONS  (STANDING):  furosemide   Injectable 40 milliGRAM(s) IV Push every 12 hours  metoprolol succinate ER 25 milliGRAM(s) Oral daily  piperacillin/tazobactam IVPB. 3.375 Gram(s) IV Intermittent every 8 hours  rivaroxaban 20 milliGRAM(s) Oral every 24 hours  vancomycin  IVPB 1000 milliGRAM(s) IV Intermittent every 12 hours    MEDICATIONS  (PRN):  oxyCODONE    5 mG/acetaminophen 325 mG 2 Tablet(s) Oral every 6 hours PRN Moderate Pain (4 - 6)      Allergies    No Known Allergies    Intolerances        T(F): 98.8 (10-07-17 @ 10:59), Max: 99.2 (10-07-17 @ 00:24)  HR: 87 (10-07-17 @ 10:59) (87 - 95)  BP: 102/55 (10-07-17 @ 10:59) (87/43 - 102/55)  RR: 20 (10-07-17 @ 10:59) (16 - 20)  SpO2: 98% (10-07-17 @ 10:59) (97% - 98%)  Wt(kg): --    PHYSICAL EXAM:  GENERAL: NAD, well-groomed, well-developed  HEAD:  Atraumatic, Normocephalic  EYES: EOMI, PERRLA, conjunctiva and sclera clear  ENMT: No tonsillar erythema, exudates, or enlargement; Moist mucous membranes, Good dentition, No lesions  NECK: Supple, No JVD, Normal thyroid  NERVOUS SYSTEM:  Alert & Oriented X3, Good concentration; Motor Strength 4/5 B/L upper and lower extremities; DTRs 2+ intact and symmetric  CHEST/LUNG: Clear to percussion bilaterally; No rales, rhonchi, wheezing, or rubs BL  HEART: irreg/irreg  ABDOMEN: Soft, Nontender, Nondistended; Bowel sounds present  EXTREMITIES:  2+ Peripheral edema BL LE L>R, +chronic venous stasis changes, hyperpigmented skin  LYMPH: No lymphadenopathy noted  SKIN:  +chronic venous stasis changes, hyperpigmented skin    LABS:                        9.4    22.9  )-----------( 455      ( 07 Oct 2017 08:18 )             27.3     10-07    143  |  105  |  27<H>  ----------------------------<  103<H>  3.7   |  27  |  0.71    Ca    8.2<L>      07 Oct 2017 08:18  Mg     2.3     10-06          Cultures;   CAPILLARY BLOOD GLUCOSE        Lipid panel:     CARDIAC MARKERS ( 05 Oct 2017 15:26 )  .915 ng/mL / x     / x     / x     / x            RADIOLOGY & ADDITIONAL TESTS:    Imaging Personally Reviewed:  [x ] YES      Consultant(s) Notes Reviewed:  [ x] YES     Care Discussed with [x ] Consultants [X ] Patient [ ] Family  [x ]    [x ]  Other; RN

## 2017-10-07 NOTE — PROGRESS NOTE ADULT - ASSESSMENT
76yo F with a PMH of HTN, Aflutter on Xarelto; admitted c/o BLE edema w/ weeping X 1 week with difficulty walking, associated with intermittent dyspnea on exertion; patient is normally on lasix and compliant but developed pain and erythema with increased swelling.  troponin mildly elevated but found to be in Aflutter with RVR.... HRs to 120-130s.  Given bbs/lasix... feeling much better.  LE doppler negative for DVT.  Of note, pt with similar admission to [facility] 7/2017.... edema NOT 2/2 CHF but 2/2 uncontrolled flutter.  LVEF 60% at that time.  D/c'd home then with medical therapy... bbs and anticoagulation.    Con't with:  MEDICATIONS  (STANDING):  furosemide   Injectable 40 milliGRAM(s) IV Push every 12 hours  metoprolol succinate ER 25 milliGRAM(s) Oral daily  piperacillin/tazobactam IVPB. 3.375 Gram(s) IV Intermittent every 8 hours  rivaroxaban 20 milliGRAM(s) Oral every 24 hours  vancomycin  IVPB 1000 milliGRAM(s) IV Intermittent every 12 hours    Perhaps EP / ischemic evaluation soon if agreeable.    Ángel Espitia MD, FACC

## 2017-10-07 NOTE — PROGRESS NOTE ADULT - ASSESSMENT
78yo female with a PMH of HTN, Aflutter on Xarelto; admitted c/o BLE edema w/ weeping X 1 week with difficulty walking, associated with intermittent dyspnea on exertion; patient is normally on lasix and compliant but two days ago started to get pain and erythema with increased swelling. She denies fever/chills.  Denied chest pain/palpitations/syncope. In ER, troponin mildly elevated but found to be in Aflutter with RVR.... HRs to 120-130s.  Given bbs/lasix. LE doppler negative for DVT. ECG/tele:  Aflutter 90-120s with rate-related changes. Also noted with Cellulitis of left lower extremity. started on IV Zosyn, vanco. Evaluated by ID. bcx No growth to date.

## 2017-10-08 ENCOUNTER — TRANSCRIPTION ENCOUNTER (OUTPATIENT)
Age: 77
End: 2017-10-08

## 2017-10-08 LAB
ANION GAP SERPL CALC-SCNC: 10 MMOL/L — SIGNIFICANT CHANGE UP (ref 5–17)
BUN SERPL-MCNC: 22 MG/DL — SIGNIFICANT CHANGE UP (ref 7–23)
CALCIUM SERPL-MCNC: 8.2 MG/DL — LOW (ref 8.5–10.1)
CHLORIDE SERPL-SCNC: 103 MMOL/L — SIGNIFICANT CHANGE UP (ref 96–108)
CO2 SERPL-SCNC: 27 MMOL/L — SIGNIFICANT CHANGE UP (ref 22–31)
CREAT SERPL-MCNC: 0.84 MG/DL — SIGNIFICANT CHANGE UP (ref 0.5–1.3)
GLUCOSE SERPL-MCNC: 99 MG/DL — SIGNIFICANT CHANGE UP (ref 70–99)
HCT VFR BLD CALC: 29 % — LOW (ref 34.5–45)
HGB BLD-MCNC: 9.7 G/DL — LOW (ref 11.5–15.5)
MAGNESIUM SERPL-MCNC: 2.2 MG/DL — SIGNIFICANT CHANGE UP (ref 1.6–2.6)
MCHC RBC-ENTMCNC: 27.7 PG — SIGNIFICANT CHANGE UP (ref 27–34)
MCHC RBC-ENTMCNC: 33.4 GM/DL — SIGNIFICANT CHANGE UP (ref 32–36)
MCV RBC AUTO: 82.8 FL — SIGNIFICANT CHANGE UP (ref 80–100)
PLATELET # BLD AUTO: 428 K/UL — HIGH (ref 150–400)
POTASSIUM SERPL-MCNC: 3.8 MMOL/L — SIGNIFICANT CHANGE UP (ref 3.5–5.3)
POTASSIUM SERPL-SCNC: 3.8 MMOL/L — SIGNIFICANT CHANGE UP (ref 3.5–5.3)
RBC # BLD: 3.5 M/UL — LOW (ref 3.8–5.2)
RBC # FLD: 14.9 % — SIGNIFICANT CHANGE UP (ref 11–15)
SODIUM SERPL-SCNC: 140 MMOL/L — SIGNIFICANT CHANGE UP (ref 135–145)
WBC # BLD: 18.2 K/UL — HIGH (ref 3.8–10.5)
WBC # FLD AUTO: 18.2 K/UL — HIGH (ref 3.8–10.5)

## 2017-10-08 PROCEDURE — 99233 SBSQ HOSP IP/OBS HIGH 50: CPT

## 2017-10-08 RX ADMIN — OXYCODONE AND ACETAMINOPHEN 2 TABLET(S): 5; 325 TABLET ORAL at 18:02

## 2017-10-08 RX ADMIN — OXYCODONE AND ACETAMINOPHEN 2 TABLET(S): 5; 325 TABLET ORAL at 06:00

## 2017-10-08 RX ADMIN — PIPERACILLIN AND TAZOBACTAM 25 GRAM(S): 4; .5 INJECTION, POWDER, LYOPHILIZED, FOR SOLUTION INTRAVENOUS at 13:38

## 2017-10-08 RX ADMIN — RIVAROXABAN 20 MILLIGRAM(S): KIT at 18:04

## 2017-10-08 RX ADMIN — Medication 250 MILLIGRAM(S): at 18:03

## 2017-10-08 RX ADMIN — PIPERACILLIN AND TAZOBACTAM 25 GRAM(S): 4; .5 INJECTION, POWDER, LYOPHILIZED, FOR SOLUTION INTRAVENOUS at 06:45

## 2017-10-08 RX ADMIN — OXYCODONE AND ACETAMINOPHEN 2 TABLET(S): 5; 325 TABLET ORAL at 05:21

## 2017-10-08 RX ADMIN — Medication 25 MILLIGRAM(S): at 06:00

## 2017-10-08 RX ADMIN — PIPERACILLIN AND TAZOBACTAM 25 GRAM(S): 4; .5 INJECTION, POWDER, LYOPHILIZED, FOR SOLUTION INTRAVENOUS at 21:59

## 2017-10-08 RX ADMIN — OXYCODONE AND ACETAMINOPHEN 2 TABLET(S): 5; 325 TABLET ORAL at 18:49

## 2017-10-08 RX ADMIN — Medication 40 MILLIGRAM(S): at 18:02

## 2017-10-08 RX ADMIN — Medication 250 MILLIGRAM(S): at 05:21

## 2017-10-08 NOTE — DISCHARGE NOTE ADULT - MEDICATION SUMMARY - MEDICATIONS TO TAKE
I will START or STAY ON the medications listed below when I get home from the hospital:    aspirin 81 mg oral delayed release tablet  -- 1 tab(s) by mouth once a day  -- Indication: For Atrial flutter with rapid ventricular response    rivaroxaban 20 mg oral tablet  -- 1 tab(s) by mouth every 24 hours  -- Indication: For Atrial flutter with rapid ventricular response    doxycycline hyclate 100 mg oral capsule  -- 1 cap(s) by mouth 2 times a day   -- Avoid prolonged or excessive exposure to direct and/or artificial sunlight while taking this medication.  Do not take this drug if you are pregnant.  Finish all this medication unless otherwise directed by prescriber.  Medication should be taken with plenty of water.    -- Indication: For Cellulitis of left lower extremity    metoprolol succinate 25 mg oral tablet, extended release  -- 1 tab(s) by mouth once a day  -- Indication: For Cellulitis of left lower extremity    Keflex 500 mg oral capsule  -- 1 cap(s) by mouth every 12 hours   -- Finish all this medication unless otherwise directed by prescriber.    -- Indication: For Cellulitis of left lower extremity    furosemide 20 mg oral tablet  -- 1 tab(s) by mouth once a day  -- Indication: For Leg edema I will START or STAY ON the medications listed below when I get home from the hospital:    aspirin 81 mg oral delayed release tablet  -- 1 tab(s) by mouth once a day  -- Indication: For Atrial flutter with rapid ventricular response    rivaroxaban 20 mg oral tablet  -- 1 tab(s) by mouth every 24 hours  -- Indication: For Atrial flutter with rapid ventricular response    doxycycline hyclate 100 mg oral capsule  -- 1 cap(s) by mouth 2 times a day  -- Indication: For Cellulitis of left lower extremity    metoprolol succinate 25 mg oral tablet, extended release  -- 1 tab(s) by mouth once a day  -- Indication: For Essential hypertension    Keflex 500 mg oral capsule  -- 1 cap(s) by mouth every 12 hours  -- Indication: For Cellulitis of left lower extremity    furosemide 20 mg oral tablet  -- 1 tab(s) by mouth once a day  -- Indication: For Leg edema

## 2017-10-08 NOTE — DISCHARGE NOTE ADULT - SECONDARY DIAGNOSIS.
Cellulitis of left lower extremity Leg edema Troponin level elevated Essential hypertension Obesity (BMI 30-39.9) Decubitus ulcer of left heel, unstageable

## 2017-10-08 NOTE — DISCHARGE NOTE ADULT - PROVIDER TOKENS
TOKEN:'[email or web address]' TOKEN:'[email or web address]',FREE:[LAST:[Your],FIRST:[PMD()],PHONE:[(   )    -],FAX:[(   )    -]] TOKEN:'[id number]',FREE:[LAST:[Your],FIRST:[PMD()],PHONE:[(   )    -],FAX:[(   )    -]],TOKEN:'[id number]'

## 2017-10-08 NOTE — DISCHARGE NOTE ADULT - CARE PLAN
Principal Discharge DX:	Atrial flutter with rapid ventricular response  Goal:	rate controlled  Instructions for follow-up, activity and diet:	cont on beta blockers, xarelto. follow up with pmd and cardiology  Secondary Diagnosis:	Cellulitis of left lower extremity  Secondary Diagnosis:	Leg edema  Secondary Diagnosis:	Troponin level elevated  Secondary Diagnosis:	Essential hypertension Principal Discharge DX:	Atrial flutter with rapid ventricular response  Goal:	rate controlled  Instructions for follow-up, activity and diet:	cont on beta blockers, xarelto. follow up with pmd and cardiology  Secondary Diagnosis:	Cellulitis of left lower extremity  Instructions for follow-up, activity and diet:	complete abx course as prescribed  Secondary Diagnosis:	Leg edema  Secondary Diagnosis:	Troponin level elevated  Instructions for follow-up, activity and diet:	follow up with your cardiologist  Secondary Diagnosis:	Essential hypertension  Instructions for follow-up, activity and diet:	monitor BP, Principal Discharge DX:	Atrial flutter with rapid ventricular response  Goal:	rate controlled  Instructions for follow-up, activity and diet:	cont on beta blockers, xarelto. follow up with pmd and cardiology  Secondary Diagnosis:	Cellulitis of left lower extremity  Instructions for follow-up, activity and diet:	complete abx course as prescribed  Secondary Diagnosis:	Leg edema  Instructions for follow-up, activity and diet:	follow up with your PMD, complete abx course, leg elevation, compression stockings  Secondary Diagnosis:	Troponin level elevated  Instructions for follow-up, activity and diet:	follow up with your cardiologist  Secondary Diagnosis:	Obesity (BMI 30-39.9)  Instructions for follow-up, activity and diet:	exercise/ diet as tolerated Principal Discharge DX:	Atrial flutter with rapid ventricular response  Goal:	rate controlled  Instructions for follow-up, activity and diet:	cont on beta blockers, xarelto. follow up with pmd and cardiology  Secondary Diagnosis:	Cellulitis of left lower extremity  Instructions for follow-up, activity and diet:	complete abx course as prescribed  Secondary Diagnosis:	Leg edema  Instructions for follow-up, activity and diet:	follow up with your PMD, complete abx course, leg elevation, compression stockings  Secondary Diagnosis:	Troponin level elevated  Instructions for follow-up, activity and diet:	follow up with your cardiologist  Secondary Diagnosis:	Obesity (BMI 30-39.9)  Instructions for follow-up, activity and diet:	exercise/ diet as tolerated  Secondary Diagnosis:	Decubitus ulcer of left heel, unstageable  Instructions for follow-up, activity and diet:	santyl ointment locally and of loading.

## 2017-10-08 NOTE — PROGRESS NOTE ADULT - ASSESSMENT
78yo female with a PMH of HTN, Aflutter on Xarelto; admitted c/o BLE edema w/ weeping X 1 week with difficulty walking, associated with intermittent dyspnea on exertion; patient is normally on lasix and compliant but two days ago started to get pain and erythema with increased swelling. She denies fever/chills.  Denied chest pain/palpitations/syncope. In ER, troponin mildly elevated but found to be in Aflutter with RVR.... HRs to 120-130s.  Given bbs/lasix. LE doppler negative for DVT. ECG/tele:  Aflutter 90-120s with rate-related changes. Also noted with Cellulitis of left lower extremity. started on IV Zosyn, vanco. Evaluated by ID. bcx No growth to date. EF-66%. Per cardiology-  EP / ischemic evaluation soon if agreeable.

## 2017-10-08 NOTE — DISCHARGE NOTE ADULT - PLAN OF CARE
rate controlled cont on beta blockers, xarelto. follow up with pmd and cardiology complete abx course as prescribed follow up with your cardiologist monitor BP, follow up with your PMD, complete abx course, leg elevation, compression stockings exercise/ diet as tolerated santyl ointment locally and of loading.

## 2017-10-08 NOTE — PROGRESS NOTE ADULT - PROBLEM SELECTOR PLAN 1
-Cont on zosyn , vanco, follow up bcx, ID is following   -trend fever spike, trend CBC, BMP, vancomycin trough level

## 2017-10-08 NOTE — DISCHARGE NOTE ADULT - HOSPITAL COURSE
78yo female with a PMH of HTN, Aflutter on Xarelto; admitted c/o BLE edema w/ weeping X 1 week with difficulty walking, associated with intermittent dyspnea on exertion; patient is normally on lasix and compliant but two days ago started to get pain and erythema with increased swelling. She denies fever/chills.  Denied chest pain/palpitations/syncope. In ER, troponin mildly elevated but found to be in Aflutter with RVR.... HRs to 120-130s.  Given bbs/lasix. LE doppler negative for DVT. ECG/tele:  Aflutter 90-120s with rate-related changes. Also noted with Cellulitis of left lower extremity. started on IV Zosyn, vanco. Evaluated by ID. bcx No growth to date. EF-66%. Per cardiology-  EP / ischemic evaluation soon if agreeable. 76yo female with a PMH of HTN, Aflutter on Xarelto; admitted c/o BLE edema w/ weeping X 1 week with difficulty walking, associated with intermittent dyspnea on exertion; patient is normally on lasix and compliant but two days ago started to get pain and erythema with increased swelling. She denies fever/chills.  Denied chest pain/palpitations/syncope. In ER, troponin mildly elevated but found to be in Aflutter with RVR.... HRs to 120-130s.  Given bbs/lasix. LE doppler negative for DVT. ECG/tele:  Aflutter 90-120s with rate-related changes. Also noted with Cellulitis of left lower extremity. started on IV Zosyn, vanco. Evaluated by ID. bcx No growth to date. As per pt she was seen by vascular doc as outpt and was suggested compression stockings, dry ,scaly,dark, peeling skin likely venous component ,   - out of bed to chair, elevate legs, pain control, PT eval=MARIANNA,   - mineral oil BID.    EF-66%.    -cardiology on board, As per cards=likely need ischemic cardiac w/u & EPS as outpatient once pt is clinically stable   Pt f/u with Dr. Suárez as out patient.   Discussed all the above w/ daughterCeretha [phone] upon pt's request who stated that she will talk to her mother and make a decision regarding MARIANNA vs going home   Called pt's PMD  [phone], was on hold for >20 minx2 76yo female with a PMH of HTN, Aflutter on Xarelto; admitted c/o BLE edema w/ weeping X 1 week with difficulty walking, associated with intermittent dyspnea on exertion; patient is normally on lasix and compliant but two days ago started to get pain and erythema with increased swelling. She denies fever/chills.  Denied chest pain/palpitations/syncope. In ER, troponin mildly elevated but found to be in Aflutter with RVR.... HRs to 120-130s.  Given bbs/lasix. LE doppler negative for DVT. ECG/tele:  Aflutter 90-120s with rate-related changes. Also noted with Cellulitis of left lower extremity. started on IV Zosyn, vanco. Evaluated by ID. bcx No growth to date. As per pt she was seen by vascular doc as outpt and was suggested compression stockings, dry ,scaly,dark, peeling skin likely venous component ,   - out of bed to chair, elevate legs, pain control, PT eval=MARIANNA,   - mineral oil BID.    EF-66%.    -cardiology on board, As per cards=likely need ischemic cardiac w/u & EPS as outpatient once pt is clinically stable   Pt f/u with Dr. Suárez as out patient.   Discussed all the above w/ daughter Ceretha [phone] upon pt's request who stated that she will talk to her mother and make a decision regarding MARIANNA vs going home   Called pt's PMD  [phone], was on hold for >20 minx2 but could not reach him  Pt seen by  , Recommending to place santyl ointment locally and of loading for left heel unstageable 3x4 cms ulcer

## 2017-10-08 NOTE — PROGRESS NOTE ADULT - SUBJECTIVE AND OBJECTIVE BOX
Patient is a 77y old  Female who presents with a chief complaint of left leg swelling and weeping (04 Oct 2017 19:56)      OVERNIGHT EVENTS: c/o LLE pain persistent but managable w/current pain regimen. Telemetry- A Flutter rate controlled     REVIEW OF SYSTEMS: denies chest pain/SOB, diaphoresis, no F/C, cough, dizziness, headache, blurry vision, nausea, vomiting, abdominal pain. All others review of systems negative     MEDICATIONS  (STANDING):  furosemide   Injectable 40 milliGRAM(s) IV Push every 12 hours  metoprolol succinate ER 25 milliGRAM(s) Oral daily  piperacillin/tazobactam IVPB. 3.375 Gram(s) IV Intermittent every 8 hours  rivaroxaban 20 milliGRAM(s) Oral every 24 hours  vancomycin  IVPB 1000 milliGRAM(s) IV Intermittent every 12 hours    MEDICATIONS  (PRN):  oxyCODONE    5 mG/acetaminophen 325 mG 2 Tablet(s) Oral every 6 hours PRN Moderate Pain (4 - 6)      Allergies    No Known Allergies    Intolerances        T(F): 99.8 (10-08-17 @ 05:36), Max: 99.8 (10-08-17 @ 05:36)  HR: 90 (10-08-17 @ 05:36) (86 - 90)  BP: 107/55 (10-08-17 @ 05:36) (102/55 - 112/62)  RR: 18 (10-08-17 @ 05:36) (18 - 22)  SpO2: 97% (10-08-17 @ 05:36) (97% - 98%)  Wt(kg): --    PHYSICAL EXAM:  GENERAL: NAD, well-groomed, well-developed  HEAD:  Atraumatic, Normocephalic  EYES: EOMI, PERRLA, conjunctiva and sclera clear  ENMT: No tonsillar erythema, exudates, or enlargement; Moist mucous membranes, Good dentition, No lesions  NECK: Supple, No JVD, Normal thyroid  NERVOUS SYSTEM:  Alert & Oriented X3, Good concentration; Motor Strength 4/5 B/L upper and lower extremities; DTRs 2+ intact and symmetric  CHEST/LUNG: Clear to percussion bilaterally; No rales, rhonchi, wheezing, or rubs BL  HEART: irreg/irreg  ABDOMEN: Soft, Nontender, Nondistended; Bowel sounds present  EXTREMITIES:  2+ Peripheral edema BL LE L>R, +chronic venous stasis changes, hyperpigmented skin  LYMPH: No lymphadenopathy noted  SKIN:  +chronic venous stasis changes, hyperpigmented skin    LABS:                        9.7    18.2  )-----------( 428      ( 08 Oct 2017 07:12 )             29.0     10-08    140  |  103  |  22  ----------------------------<  99  3.8   |  27  |  0.84    Ca    8.2<L>      08 Oct 2017 07:12  Mg     2.2     10-08      Cultures;   bcx-  Culture Results:   No growth to date. (10.04.17 @ 23:42)    ucx-  Culture Results:   No growth (10.05.17 @ 08:33)      CAPILLARY BLOOD GLUCOSE        Lipid panel:       RADIOLOGY & ADDITIONAL TESTS:    Imaging Personally Reviewed:  [x ] YES   Transthoracic Echocardiogram (07.19.17 @ 14:49) >    Ejection Fraction (Teicholtz): 66 %    CONCLUSIONS:  1. Mitral annular calcification, otherwise normal mitral  valve. Mild mitral regurgitation.  2. Mildly dilated left atrium.  LA volume index = 35 cc/m2.  3. Normal left ventricular internal dimensions and wall  thicknesses.  4. Normal left ventricular systolic function. No segmental  wall motion abnormalities.  5. Normal right ventricular size and function.  6. Estimated right ventricular systolic pressure equals 42  mm Hg, assuming right atrial pressure equals 10 mm Hg,  consistent with mild pulmonary hypertension.         Consultant(s) Notes Reviewed:  [x ] YES     Care Discussed with [x ] Consultants [X ] Patient [x ] Family; dtr  [x ]    [x ]  Other; RN

## 2017-10-08 NOTE — DISCHARGE NOTE ADULT - PATIENT PORTAL LINK FT
“You can access the [facility] Patient Portal, offered by [facility], by registering with the following website: [email or web address]”

## 2017-10-08 NOTE — DISCHARGE NOTE ADULT - CARE PROVIDER_API CALL
Ángel Espitia), Cardiovascular Disease; Internal Medicine; Nuclear Cardiology  [address] [place] [address]  Phone: [phone]  Fax: [phone] Ángel Espitia), Cardiovascular Disease; Internal Medicine; Nuclear Cardiology  [address] [place] [address]  Phone: [phone]  Fax: [phone]    TEDDY Justice()  Phone: (   )    -  Fax: (   )    - Ángel Espitia (MD), Cardiovascular Disease; Internal Medicine; Nuclear Cardiology  [address] [place] [address]  Phone: [phone]  Fax: [phone]    Vinh, PMD()  Phone: (   )    -  Fax: (   )    -    Asad Dinh), Surgery  [address] [facility]  [address] [place] [address]  Phone: [phone]  Fax: [phone]

## 2017-10-08 NOTE — DISCHARGE NOTE ADULT - CARE PROVIDERS DIRECT ADDRESSES
,[email or web address],DirectAddress_Unknown ,[email or web address],DirectAddress_Unknown,DirectAddress_Unknown

## 2017-10-08 NOTE — DISCHARGE NOTE ADULT - NSTOBACCOHOTLINE_GEN_A_CS
[facility] Smokers Quitline ([phone]-HD-ZSQOV) [facility] Smokers Quitline (985-BD-[phone]) [facility] Smokers Quitline (805-UU-QUYYG)

## 2017-10-09 DIAGNOSIS — R60.9 EDEMA, UNSPECIFIED: ICD-10-CM

## 2017-10-09 DIAGNOSIS — E66.9 OBESITY, UNSPECIFIED: ICD-10-CM

## 2017-10-09 DIAGNOSIS — I48.92 UNSPECIFIED ATRIAL FLUTTER: ICD-10-CM

## 2017-10-09 LAB
ANION GAP SERPL CALC-SCNC: 10 MMOL/L — SIGNIFICANT CHANGE UP (ref 5–17)
BASOPHILS # BLD AUTO: 0 K/UL — SIGNIFICANT CHANGE UP (ref 0–0.2)
BASOPHILS NFR BLD AUTO: 0.3 % — SIGNIFICANT CHANGE UP (ref 0–2)
BUN SERPL-MCNC: 18 MG/DL — SIGNIFICANT CHANGE UP (ref 7–23)
CALCIUM SERPL-MCNC: 8.3 MG/DL — LOW (ref 8.5–10.1)
CHLORIDE SERPL-SCNC: 104 MMOL/L — SIGNIFICANT CHANGE UP (ref 96–108)
CO2 SERPL-SCNC: 27 MMOL/L — SIGNIFICANT CHANGE UP (ref 22–31)
CREAT SERPL-MCNC: 0.83 MG/DL — SIGNIFICANT CHANGE UP (ref 0.5–1.3)
EOSINOPHIL # BLD AUTO: 0.1 K/UL — SIGNIFICANT CHANGE UP (ref 0–0.5)
EOSINOPHIL NFR BLD AUTO: 0.5 % — SIGNIFICANT CHANGE UP (ref 0–6)
GLUCOSE SERPL-MCNC: 97 MG/DL — SIGNIFICANT CHANGE UP (ref 70–99)
HCT VFR BLD CALC: 28 % — LOW (ref 34.5–45)
HGB BLD-MCNC: 9.1 G/DL — LOW (ref 11.5–15.5)
LYMPHOCYTES # BLD AUTO: 1.5 K/UL — SIGNIFICANT CHANGE UP (ref 1–3.3)
LYMPHOCYTES # BLD AUTO: 8.3 % — LOW (ref 13–44)
MCHC RBC-ENTMCNC: 26.1 PG — LOW (ref 27–34)
MCHC RBC-ENTMCNC: 32.3 GM/DL — SIGNIFICANT CHANGE UP (ref 32–36)
MCV RBC AUTO: 80.9 FL — SIGNIFICANT CHANGE UP (ref 80–100)
MONOCYTES # BLD AUTO: 1 K/UL — HIGH (ref 0–0.9)
MONOCYTES NFR BLD AUTO: 5.5 % — SIGNIFICANT CHANGE UP (ref 2–14)
NEUTROPHILS # BLD AUTO: 15.4 K/UL — HIGH (ref 1.8–7.4)
NEUTROPHILS NFR BLD AUTO: 85.4 % — HIGH (ref 43–77)
PLATELET # BLD AUTO: 477 K/UL — HIGH (ref 150–400)
POTASSIUM SERPL-MCNC: 3.9 MMOL/L — SIGNIFICANT CHANGE UP (ref 3.5–5.3)
POTASSIUM SERPL-SCNC: 3.9 MMOL/L — SIGNIFICANT CHANGE UP (ref 3.5–5.3)
RBC # BLD: 3.46 M/UL — LOW (ref 3.8–5.2)
RBC # FLD: 15.5 % — HIGH (ref 11–15)
SODIUM SERPL-SCNC: 141 MMOL/L — SIGNIFICANT CHANGE UP (ref 135–145)
VANCOMYCIN TROUGH SERPL-MCNC: 12.5 UG/ML — SIGNIFICANT CHANGE UP (ref 10–20)
WBC # BLD: 18 K/UL — HIGH (ref 3.8–10.5)
WBC # FLD AUTO: 18 K/UL — HIGH (ref 3.8–10.5)

## 2017-10-09 PROCEDURE — 99233 SBSQ HOSP IP/OBS HIGH 50: CPT

## 2017-10-09 PROCEDURE — 99232 SBSQ HOSP IP/OBS MODERATE 35: CPT

## 2017-10-09 RX ORDER — ASPIRIN/CALCIUM CARB/MAGNESIUM 324 MG
81 TABLET ORAL DAILY
Qty: 0 | Refills: 0 | Status: DISCONTINUED | OUTPATIENT
Start: 2017-10-09 | End: 2017-10-12

## 2017-10-09 RX ORDER — FUROSEMIDE 40 MG
40 TABLET ORAL DAILY
Qty: 0 | Refills: 0 | Status: DISCONTINUED | OUTPATIENT
Start: 2017-10-09 | End: 2017-10-12

## 2017-10-09 RX ORDER — ATORVASTATIN CALCIUM 80 MG/1
10 TABLET, FILM COATED ORAL AT BEDTIME
Qty: 0 | Refills: 0 | Status: DISCONTINUED | OUTPATIENT
Start: 2017-10-09 | End: 2017-10-11

## 2017-10-09 RX ADMIN — Medication 250 MILLIGRAM(S): at 22:39

## 2017-10-09 RX ADMIN — PIPERACILLIN AND TAZOBACTAM 25 GRAM(S): 4; .5 INJECTION, POWDER, LYOPHILIZED, FOR SOLUTION INTRAVENOUS at 06:39

## 2017-10-09 RX ADMIN — RIVAROXABAN 20 MILLIGRAM(S): KIT at 17:56

## 2017-10-09 RX ADMIN — ATORVASTATIN CALCIUM 10 MILLIGRAM(S): 80 TABLET, FILM COATED ORAL at 22:40

## 2017-10-09 RX ADMIN — Medication 40 MILLIGRAM(S): at 05:20

## 2017-10-09 RX ADMIN — PIPERACILLIN AND TAZOBACTAM 25 GRAM(S): 4; .5 INJECTION, POWDER, LYOPHILIZED, FOR SOLUTION INTRAVENOUS at 22:40

## 2017-10-09 RX ADMIN — PIPERACILLIN AND TAZOBACTAM 25 GRAM(S): 4; .5 INJECTION, POWDER, LYOPHILIZED, FOR SOLUTION INTRAVENOUS at 17:56

## 2017-10-09 RX ADMIN — Medication 25 MILLIGRAM(S): at 05:20

## 2017-10-09 RX ADMIN — Medication 250 MILLIGRAM(S): at 05:22

## 2017-10-09 NOTE — PROGRESS NOTE ADULT - ASSESSMENT
76yo lady with a PMH of HTN, Aflutter on Xarelto; admitted c/o BLE edema w/ weeping X 1 week with difficulty walking, associated with intermittent dyspnea on exertion; patient is normally on Lasix  but two days ago started to get pain and erythema with increased swelling. 76yo lady with a PMH of HTN, Aflutter on Xarelto; admitted c/o BLE edema w/ weeping X 1 week with difficulty walking, associated with intermittent dyspnea on exertion

## 2017-10-09 NOTE — PROGRESS NOTE ADULT - ASSESSMENT
LLE skin is drying off and peeling ,swelling going down  will benefit from vascular eval   Leukocytosis :sec to cellulitis implying   wont need CT leg at this point 77 yr old female seen with

## 2017-10-09 NOTE — PROGRESS NOTE ADULT - ASSESSMENT
78yo female with a PMH of HTN, Aflutter on Xarelto; admitted c/o BLE edema w/ weeping X 1 week with difficulty walking, associated with intermittent dyspnea on exertion; patient is normally on lasix and compliant but two days ago started to get pain and erythema with increased swelling. She denies fever/chills.  Denied chest pain/palpitations/syncope. In ER, troponin mildly elevated but found to be in Aflutter with RVR.... HRs to 120-130s.  Given bbs/lasix. LE doppler negative for DVT. ECG/tele:  Aflutter 90-120s with rate-related changes. Also noted with Cellulitis of left lower extremity. started on IV Zosyn, vanco. Evaluated by ID. bcx No growth to date. EF-66%.As per cardiology- EP/Ischemic evaluation as outpt

## 2017-10-09 NOTE — PROGRESS NOTE ADULT - PROBLEM SELECTOR PLAN 2
c/w asa, statin, bbl  Echo with preserved EF  likely need ischemic cardiac w/u & EPS as outpatient once pt is clinically stable   Pt f/u with Dr. Suárez as out patient c/w asa, statin, bbl  Echo with preserved EF  likely need ischemic cardiac w/u & EPS as outpatient once pt is clinically stable   Pt f/u with Dr. Mckenna as out patient

## 2017-10-09 NOTE — PROGRESS NOTE ADULT - PROBLEM SELECTOR PLAN 1
- Swelling and pain  improved as per pt  - On zosyn & vancomycin IV   - Vanc level noted, Blood Cx negative so far   - WBC trending down ,24-->18   - ID on board, Discussed with   - trend labs, vancomycin trough level  - out of bed to chair, elevate legs, pain control

## 2017-10-09 NOTE — PROGRESS NOTE ADULT - SUBJECTIVE AND OBJECTIVE BOX
Patient is a 77y old  Female who presents with a chief complaint of left leg swelling and weeping (08 Oct 2017 11:04)      INTERVAL HPI / OVERNIGHT EVENTS:    MEDICATIONS  (STANDING):  aspirin enteric coated 81 milliGRAM(s) Oral daily  atorvastatin 10 milliGRAM(s) Oral at bedtime  furosemide    Tablet 40 milliGRAM(s) Oral daily  metoprolol succinate ER 25 milliGRAM(s) Oral daily  piperacillin/tazobactam IVPB. 3.375 Gram(s) IV Intermittent every 8 hours  rivaroxaban 20 milliGRAM(s) Oral every 24 hours  vancomycin  IVPB 1000 milliGRAM(s) IV Intermittent every 12 hours    MEDICATIONS  (PRN):  oxyCODONE    5 mG/acetaminophen 325 mG 2 Tablet(s) Oral every 6 hours PRN Moderate Pain (4 - 6)      Vital Signs Last 24 Hrs  T(C): 37.7 (09 Oct 2017 17:24), Max: 37.7 (09 Oct 2017 12:35)  T(F): 99.8 (09 Oct 2017 17:24), Max: 99.8 (09 Oct 2017 12:35)  HR: 84 (09 Oct 2017 17:24) (84 - 86)  BP: 116/61 (09 Oct 2017 17:24) (99/51 - 116/61)  BP(mean): --  RR: 18 (09 Oct 2017 17:24) (16 - 18)  SpO2: 96% (09 Oct 2017 17:24) (96% - 100%)    PHYSICAL EXAM:  General :NAD  Constitutional:  well-groomed, well-developed  Respiratory: CTAB/L  Cardiovascular: S1 and S2, RRR, no M/G/R  Gastrointestinal: BS+, soft, NT/ND  Extremities: No peripheral edema  Vascular: 2+ peripheral pulses  Skin: No rashes      LABS:                        9.1    18.0  )-----------( 477      ( 09 Oct 2017 04:55 )             28.0     10-09    141  |  104  |  18  ----------------------------<  97  3.9   |  27  |  0.83    Ca    8.3<L>      09 Oct 2017 04:55  Mg     2.2     10-08            MICROBIOLOGY:  RECENT CULTURES:  10-05 .Urine Clean Catch (Midstream) XXXX XXXX   No growth    10-04 .Blood Blood-Peripheral XXXX XXXX   No growth to date.    10-04 .Blood Blood-Peripheral XXXX XXXX   No growth to date.          RADIOLOGY & ADDITIONAL STUDIES: Patient is a 77y old  Female who presents with a chief complaint of left leg swelling and weeping (08 Oct 2017 11:04)      INTERVAL HPI / OVERNIGHT EVENTS: pain is improving. no fever    MEDICATIONS  (STANDING):  aspirin enteric coated 81 milliGRAM(s) Oral daily  atorvastatin 10 milliGRAM(s) Oral at bedtime  furosemide    Tablet 40 milliGRAM(s) Oral daily  metoprolol succinate ER 25 milliGRAM(s) Oral daily  piperacillin/tazobactam IVPB. 3.375 Gram(s) IV Intermittent every 8 hours  rivaroxaban 20 milliGRAM(s) Oral every 24 hours  vancomycin  IVPB 1000 milliGRAM(s) IV Intermittent every 12 hours    MEDICATIONS  (PRN):  oxyCODONE    5 mG/acetaminophen 325 mG 2 Tablet(s) Oral every 6 hours PRN Moderate Pain (4 - 6)      Vital Signs Last 24 Hrs  T(C): 37.7 (09 Oct 2017 17:24), Max: 37.7 (09 Oct 2017 12:35)  T(F): 99.8 (09 Oct 2017 17:24), Max: 99.8 (09 Oct 2017 12:35)  HR: 84 (09 Oct 2017 17:24) (84 - 86)  BP: 116/61 (09 Oct 2017 17:24) (99/51 - 116/61)  BP(mean): --  RR: 18 (09 Oct 2017 17:24) (16 - 18)  SpO2: 96% (09 Oct 2017 17:24) (96% - 100%)    PHYSICAL EXAM:  General :NAD  Constitutional:  well-groomed, well-developed  Respiratory: CTAB/L  Cardiovascular: S1 and S2, RRR, no M/G/R  Gastrointestinal: BS+, soft, NT/ND  Extremities/skin: swelling reducing and tree bark peeling of skin   Vascular: left foot dorsalis pedis feeble        LABS:                        9.1    18.0  )-----------( 477      ( 09 Oct 2017 04:55 )             28.0     10-09    141  |  104  |  18  ----------------------------<  97  3.9   |  27  |  0.83    Ca    8.3<L>      09 Oct 2017 04:55  Mg     2.2     10-08            MICROBIOLOGY:  RECENT CULTURES:  10-05 .Urine Clean Catch (Midstream) XXXX XXXX   No growth    10-04 .Blood Blood-Peripheral XXXX XXXX   No growth to date.    10-04 .Blood Blood-Peripheral XXXX XXXX   No growth to date.          RADIOLOGY & ADDITIONAL STUDIES:

## 2017-10-09 NOTE — PROGRESS NOTE ADULT - SUBJECTIVE AND OBJECTIVE BOX
Patient is a 77y old  Female who presents with a chief complaint of left leg swelling and weeping (08 Oct 2017 11:04)       OVERNIGHT EVENTS: c/o b/l LE pain    MEDICATIONS  (STANDING):  furosemide   Injectable 40 milliGRAM(s) IV Push every 12 hours  metoprolol succinate ER 25 milliGRAM(s) Oral daily  piperacillin/tazobactam IVPB. 3.375 Gram(s) IV Intermittent every 8 hours  rivaroxaban 20 milliGRAM(s) Oral every 24 hours  vancomycin  IVPB 1000 milliGRAM(s) IV Intermittent every 12 hours    MEDICATIONS  (PRN):  oxyCODONE    5 mG/acetaminophen 325 mG 2 Tablet(s) Oral every 6 hours PRN Moderate Pain (4 - 6)    Vital Signs Last 24 Hrs  T(C): 37.7 (09 Oct 2017 12:35), Max: 37.7 (08 Oct 2017 17:20)  T(F): 99.8 (09 Oct 2017 12:35), Max: 99.8 (08 Oct 2017 17:20)  HR: 85 (09 Oct 2017 12:35) (85 - 86)  BP: 99/51 (09 Oct 2017 12:35) (99/51 - 114/63)  BP(mean): --  RR: 16 (09 Oct 2017 12:35) (16 - 18)  SpO2: 98% (09 Oct 2017 12:35) (96% - 100%)    PHYSICAL EXAM:  GENERAL: NAD, well-groomed, well-developed  HEAD:  Atraumatic, Normocephalic  EYES: EOMI, PERRLA, conjunctiva and sclera clear  ENMT: No tonsillar erythema, exudates, or enlargement; Moist mucous membranes   NECK: Supple, No JVD   NERVOUS SYSTEM:  Alert & Oriented X3, decreased ROM LE 2/2 to pain   CHEST/LUNG: Clear to auscultation  bilaterally; No rales, rhonchi, wheezing, or rubs  HEART: S1 S2, Irregular  ABDOMEN: Soft, Nontender, Nondistended; Bowel sounds present  EXTREMITIES:  trace edema b/l LE, very dry scaling skin b/l LE left>Rt, warm/ tender to touch , no ulcers,2+pulses  LYMPH: No lymphadenopathy noted  SKIN: as above     LABS:                        9.1    18.0  )-----------( 477      ( 09 Oct 2017 04:55 )             28.0     10-09    141  |  104  |  18  ----------------------------<  97  3.9   |  27  |  0.83    Ca    8.3<L>      09 Oct 2017 04:55  Mg     2.2     10-08         cardiac markers     CAPILLARY BLOOD GLUCOSE        Cultures    RADIOLOGY & ADDITIONAL TESTS:    Imaging Personally Reviewed:  [ x] YES  [ ] NO    Consultant(s) Notes Reviewed:  [x ] YES  [ ] NO    Care Discussed with Consultants/Other Providers [x ] YES  [ ] NO

## 2017-10-09 NOTE — PROGRESS NOTE ADULT - PROBLEM SELECTOR PLAN 1
cont vanco and zosyn  swelling resolved ,now skin is peeling off extensively which will come off spontaneously  apply mineral oil   pt had outpt w/u with vascular who recommend medical management like stockings etc.  No need of CT leg at this point

## 2017-10-09 NOTE — PROGRESS NOTE ADULT - PROBLEM SELECTOR PLAN 4
fluctuation in BP noted Low BP, agree with d/c of standing order Lasix, patient appears euvolemic. Continue low dose bbl if tolerated.

## 2017-10-09 NOTE — PROGRESS NOTE ADULT - SUBJECTIVE AND OBJECTIVE BOX
Patient is a 77y old  Female who presents with a chief complaint of left leg swelling and weeping (08 Oct 2017 11:04)      PAST MEDICAL & SURGICAL HISTORY:  Bronchitis  Obesity (BMI 30-39.9)  HTN (hypertension)  Cyst, ovarian  S/P dilatation and curettage: in 1994  angiogram of RLE to R/O DVT-  ~ 3-4 years ago- Negative test: angiogram of RLE to R/O DVT-  ~ 3-4 years ago- Negative test      INTERVAL HISTORY: Resting in bed, not in acute distress  	  MEDICATIONS:  MEDICATIONS  (STANDING):  aspirin enteric coated 81 milliGRAM(s) Oral daily  atorvastatin 10 milliGRAM(s) Oral at bedtime  furosemide    Tablet 40 milliGRAM(s) Oral daily  metoprolol succinate ER 25 milliGRAM(s) Oral daily  piperacillin/tazobactam IVPB. 3.375 Gram(s) IV Intermittent every 8 hours  rivaroxaban 20 milliGRAM(s) Oral every 24 hours  vancomycin  IVPB 1000 milliGRAM(s) IV Intermittent every 12 hours    MEDICATIONS  (PRN):  oxyCODONE    5 mG/acetaminophen 325 mG 2 Tablet(s) Oral every 6 hours PRN Moderate Pain (4 - 6)      Vitals:  T(F): 99.8 (10-09-17 @ 12:35), Max: 99.8 (10-08-17 @ 17:20)  HR: 85 (10-09-17 @ 12:35) (85 - 86)  BP: 99/51 (10-09-17 @ 12:35) (99/51 - 114/63)  RR: 16 (10-09-17 @ 12:35) (16 - 18)  SpO2: 98% (10-09-17 @ 12:35) (96% - 100%)  Wt(kg): -- 73.7 kg    10-08 @ 07:01  -  10-09 @ 07:00  --------------------------------------------------------  IN:  Total IN: 0 mL    OUT:    Voided: 250 mL  Total OUT: 250 mL    Total NET: -250 mL      10-09 @ 07:01  -  10-09 @ 14:37  --------------------------------------------------------  IN:    Oral Fluid: 900 mL  Total IN: 900 mL    OUT:  Total OUT: 0 mL    Total NET: 900 mL    PHYSICAL EXAM:  Neuro: Awake, responsive  CV: S1 S2 RRR  Lungs: CTABL  GI: Soft, BS +, ND, NT  Extremities: No edema    TELEMETRY: RSR  	    RADIOLOGY: < from: Xray Chest 1 View AP/PA. (10.04.17 @ 18:52) >    There are no focal air space opacities. The cardiac and mediastinal   contours are prominent, which may be due to magnification from AP   technique and shallow inspiration. The osseous structures are intact.    IMPRESSION:    No focal air space opacities.      < end of copied text >      DIAGNOSTIC TESTING:    [ x] Echocardiogram: < from: Transthoracic Echocardiogram (07.19.17 @ 14:49) >  1. Mitral annular calcification, otherwise normal mitral  valve. Mild mitral regurgitation.  2. Mildly dilated left atrium.  LA volume index = 35 cc/m2.  3. Normal left ventricular internal dimensions and wall  thicknesses.  4. Normal left ventricular systolic function. No segmental  wall motion abnormalities.  5. Normal right ventricular size and function.  6. Estimated right ventricular systolic pressure equals 42  mm Hg, assuming right atrial pressure equals 10 mm Hg,  consistent with mild pulmonary hypertension.  ------------------------------------------------------------    < end of copied text >    LABS:	 	    09 Oct 2017 04:55    141    |  104    |  18     ----------------------------<  97     3.9     |  27     |  0.83   08 Oct 2017 07:12    140    |  103    |  22     ----------------------------<  99     3.8     |  27     |  0.84   07 Oct 2017 08:18    143    |  105    |  27     ----------------------------<  103    3.7     |  27     |  0.71     Ca    8.3        09 Oct 2017 04:55  Mg     2.2       08 Oct 2017 07:12                            9.1    18.0  )-----------( 477      ( 09 Oct 2017 04:55 )             28.0 ,                       9.7    18.2  )-----------( 428      ( 08 Oct 2017 07:12 )             29.0 ,                       9.4    22.9  )-----------( 455      ( 07 Oct 2017 08:18 )             27.3 Patient is a 77y old  Female who presents with a chief complaint of left leg swelling and weeping (08 Oct 2017 11:04)      PAST MEDICAL & SURGICAL HISTORY:  Bronchitis  Obesity (BMI 30-39.9)  HTN (hypertension)  Cyst, ovarian  S/P dilatation and curettage: in 1994  angiogram of RLE to R/O DVT-  ~ 3-4 years ago- Negative test: angiogram of RLE to R/O DVT-  ~ 3-4 years ago- Negative test      INTERVAL HISTORY: Resting in bed, not in acute distress, denies any chest pain or sob, + discomfort in lower extremities  	  MEDICATIONS:  MEDICATIONS  (STANDING):  aspirin enteric coated 81 milliGRAM(s) Oral daily  atorvastatin 10 milliGRAM(s) Oral at bedtime  furosemide    Tablet 40 milliGRAM(s) Oral daily  metoprolol succinate ER 25 milliGRAM(s) Oral daily  piperacillin/tazobactam IVPB. 3.375 Gram(s) IV Intermittent every 8 hours  rivaroxaban 20 milliGRAM(s) Oral every 24 hours  vancomycin  IVPB 1000 milliGRAM(s) IV Intermittent every 12 hours    MEDICATIONS  (PRN):  oxyCODONE    5 mG/acetaminophen 325 mG 2 Tablet(s) Oral every 6 hours PRN Moderate Pain (4 - 6)      Vitals:  T(F): 99.8 (10-09-17 @ 12:35), Max: 99.8 (10-08-17 @ 17:20)  HR: 85 (10-09-17 @ 12:35) (85 - 86)  BP: 99/51 (10-09-17 @ 12:35) (99/51 - 114/63)  RR: 16 (10-09-17 @ 12:35) (16 - 18)  SpO2: 98% (10-09-17 @ 12:35) (96% - 100%)  Wt(kg): -- 73.7 kg    10-08 @ 07:01  -  10-09 @ 07:00  --------------------------------------------------------  IN:  Total IN: 0 mL    OUT:    Voided: 250 mL  Total OUT: 250 mL    Total NET: -250 mL      10-09 @ 07:01  -  10-09 @ 14:37  --------------------------------------------------------  IN:    Oral Fluid: 900 mL  Total IN: 900 mL    OUT:  Total OUT: 0 mL    Total NET: 900 mL    PHYSICAL EXAM:  Neuro: Awake, responsive  CV: S1 S2 RRR  Lungs: CTABL  GI: Soft, BS +, ND, NT  Extremities: No edema    TELEMETRY: RSR  	    RADIOLOGY: < from: Xray Chest 1 View AP/PA. (10.04.17 @ 18:52) >    There are no focal air space opacities. The cardiac and mediastinal   contours are prominent, which may be due to magnification from AP   technique and shallow inspiration. The osseous structures are intact.    IMPRESSION:    No focal air space opacities.      < end of copied text >      DIAGNOSTIC TESTING:    [ x] Echocardiogram: < from: Transthoracic Echocardiogram (07.19.17 @ 14:49) >  1. Mitral annular calcification, otherwise normal mitral  valve. Mild mitral regurgitation.  2. Mildly dilated left atrium.  LA volume index = 35 cc/m2.  3. Normal left ventricular internal dimensions and wall  thicknesses.  4. Normal left ventricular systolic function. No segmental  wall motion abnormalities.  5. Normal right ventricular size and function.  6. Estimated right ventricular systolic pressure equals 42  mm Hg, assuming right atrial pressure equals 10 mm Hg,  consistent with mild pulmonary hypertension.  ------------------------------------------------------------    < end of copied text >    LABS:	 	    09 Oct 2017 04:55    141    |  104    |  18     ----------------------------<  97     3.9     |  27     |  0.83   08 Oct 2017 07:12    140    |  103    |  22     ----------------------------<  99     3.8     |  27     |  0.84   07 Oct 2017 08:18    143    |  105    |  27     ----------------------------<  103    3.7     |  27     |  0.71     Ca    8.3        09 Oct 2017 04:55  Mg     2.2       08 Oct 2017 07:12                            9.1    18.0  )-----------( 477      ( 09 Oct 2017 04:55 )             28.0 ,                       9.7    18.2  )-----------( 428      ( 08 Oct 2017 07:12 )             29.0 ,                       9.4    22.9  )-----------( 455      ( 07 Oct 2017 08:18 )             27.3 Patient is a 77y old  Female who presents with a chief complaint of left leg swelling and weeping (08 Oct 2017 11:04)      PAST MEDICAL & SURGICAL HISTORY:  Bronchitis  Obesity (BMI 30-39.9)  HTN (hypertension)  Cyst, ovarian  S/P dilatation and curettage: in 1994  angiogram of RLE to R/O DVT-  ~ 3-4 years ago- Negative test: angiogram of RLE to R/O DVT-  ~ 3-4 years ago- Negative test      INTERVAL HISTORY: Resting in bed, not in acute distress, denies any chest pain or sob, + discomfort in lower extremities  	  MEDICATIONS:  MEDICATIONS  (STANDING):  aspirin enteric coated 81 milliGRAM(s) Oral daily  atorvastatin 10 milliGRAM(s) Oral at bedtime  furosemide    Tablet 40 milliGRAM(s) Oral daily  metoprolol succinate ER 25 milliGRAM(s) Oral daily  piperacillin/tazobactam IVPB. 3.375 Gram(s) IV Intermittent every 8 hours  rivaroxaban 20 milliGRAM(s) Oral every 24 hours  vancomycin  IVPB 1000 milliGRAM(s) IV Intermittent every 12 hours    MEDICATIONS  (PRN):  oxyCODONE    5 mG/acetaminophen 325 mG 2 Tablet(s) Oral every 6 hours PRN Moderate Pain (4 - 6)      Vitals:  T(F): 99.8 (10-09-17 @ 12:35), Max: 99.8 (10-08-17 @ 17:20)  HR: 85 (10-09-17 @ 12:35) (85 - 86)  BP: 99/51 (10-09-17 @ 12:35) (99/51 - 114/63)  RR: 16 (10-09-17 @ 12:35) (16 - 18)  SpO2: 98% (10-09-17 @ 12:35) (96% - 100%)  Wt(kg): -- 73.7 kg    10-08 @ 07:01  -  10-09 @ 07:00  --------------------------------------------------------  IN:  Total IN: 0 mL    OUT:    Voided: 250 mL  Total OUT: 250 mL    Total NET: -250 mL      10-09 @ 07:01  -  10-09 @ 14:37  --------------------------------------------------------  IN:    Oral Fluid: 900 mL  Total IN: 900 mL    OUT:  Total OUT: 0 mL    Total NET: 900 mL    PHYSICAL EXAM:  Neuro: Awake, responsive  CV: S1 S2 RRR, atrial flutter  Lungs: diminished to bases  GI: Soft, BS +, ND, NT  Extremities:  dark discoloration to lower legs with dry skin peeling off from Left lower leg, + edema, L>R    TELEMETRY: Atrial Flutter  	    RADIOLOGY: < from: Xray Chest 1 View AP/PA. (10.04.17 @ 18:52) >    There are no focal air space opacities. The cardiac and mediastinal   contours are prominent, which may be due to magnification from AP   technique and shallow inspiration. The osseous structures are intact.    IMPRESSION:    No focal air space opacities.      < end of copied text >      DIAGNOSTIC TESTING:    [ x] Echocardiogram: < from: Transthoracic Echocardiogram (07.19.17 @ 14:49) >  1. Mitral annular calcification, otherwise normal mitral  valve. Mild mitral regurgitation.  2. Mildly dilated left atrium.  LA volume index = 35 cc/m2.  3. Normal left ventricular internal dimensions and wall  thicknesses.  4. Normal left ventricular systolic function. No segmental  wall motion abnormalities.  5. Normal right ventricular size and function.  6. Estimated right ventricular systolic pressure equals 42  mm Hg, assuming right atrial pressure equals 10 mm Hg,  consistent with mild pulmonary hypertension.  ------------------------------------------------------------    < end of copied text >    LABS:	 	    09 Oct 2017 04:55    141    |  104    |  18     ----------------------------<  97     3.9     |  27     |  0.83   08 Oct 2017 07:12    140    |  103    |  22     ----------------------------<  99     3.8     |  27     |  0.84   07 Oct 2017 08:18    143    |  105    |  27     ----------------------------<  103    3.7     |  27     |  0.71     Ca    8.3        09 Oct 2017 04:55  Mg     2.2       08 Oct 2017 07:12                            9.1    18.0  )-----------( 477      ( 09 Oct 2017 04:55 )             28.0 ,                       9.7    18.2  )-----------( 428      ( 08 Oct 2017 07:12 )             29.0 ,                       9.4    22.9  )-----------( 455      ( 07 Oct 2017 08:18 )             27.3

## 2017-10-10 LAB
ALBUMIN SERPL ELPH-MCNC: 1.7 G/DL — LOW (ref 3.3–5)
ALP SERPL-CCNC: 171 U/L — HIGH (ref 40–120)
ALT FLD-CCNC: 31 U/L — SIGNIFICANT CHANGE UP (ref 12–78)
ANION GAP SERPL CALC-SCNC: 9 MMOL/L — SIGNIFICANT CHANGE UP (ref 5–17)
AST SERPL-CCNC: 23 U/L — SIGNIFICANT CHANGE UP (ref 15–37)
BILIRUB SERPL-MCNC: 0.8 MG/DL — SIGNIFICANT CHANGE UP (ref 0.2–1.2)
BUN SERPL-MCNC: 16 MG/DL — SIGNIFICANT CHANGE UP (ref 7–23)
CALCIUM SERPL-MCNC: 8.2 MG/DL — LOW (ref 8.5–10.1)
CHLORIDE SERPL-SCNC: 102 MMOL/L — SIGNIFICANT CHANGE UP (ref 96–108)
CO2 SERPL-SCNC: 29 MMOL/L — SIGNIFICANT CHANGE UP (ref 22–31)
CREAT SERPL-MCNC: 0.76 MG/DL — SIGNIFICANT CHANGE UP (ref 0.5–1.3)
CULTURE RESULTS: SIGNIFICANT CHANGE UP
CULTURE RESULTS: SIGNIFICANT CHANGE UP
GLUCOSE SERPL-MCNC: 86 MG/DL — SIGNIFICANT CHANGE UP (ref 70–99)
HCT VFR BLD CALC: 27.8 % — LOW (ref 34.5–45)
HGB BLD-MCNC: 9.5 G/DL — LOW (ref 11.5–15.5)
MCHC RBC-ENTMCNC: 28.1 PG — SIGNIFICANT CHANGE UP (ref 27–34)
MCHC RBC-ENTMCNC: 34.1 GM/DL — SIGNIFICANT CHANGE UP (ref 32–36)
MCV RBC AUTO: 82.6 FL — SIGNIFICANT CHANGE UP (ref 80–100)
PLATELET # BLD AUTO: 447 K/UL — HIGH (ref 150–400)
POTASSIUM SERPL-MCNC: 3.8 MMOL/L — SIGNIFICANT CHANGE UP (ref 3.5–5.3)
POTASSIUM SERPL-SCNC: 3.8 MMOL/L — SIGNIFICANT CHANGE UP (ref 3.5–5.3)
PROT SERPL-MCNC: 6.6 GM/DL — SIGNIFICANT CHANGE UP (ref 6–8.3)
RBC # BLD: 3.37 M/UL — LOW (ref 3.8–5.2)
RBC # FLD: 15.1 % — HIGH (ref 11–15)
SODIUM SERPL-SCNC: 140 MMOL/L — SIGNIFICANT CHANGE UP (ref 135–145)
SPECIMEN SOURCE: SIGNIFICANT CHANGE UP
SPECIMEN SOURCE: SIGNIFICANT CHANGE UP
WBC # BLD: 16 K/UL — HIGH (ref 3.8–10.5)
WBC # FLD AUTO: 16 K/UL — HIGH (ref 3.8–10.5)

## 2017-10-10 PROCEDURE — 99233 SBSQ HOSP IP/OBS HIGH 50: CPT

## 2017-10-10 PROCEDURE — 99232 SBSQ HOSP IP/OBS MODERATE 35: CPT

## 2017-10-10 RX ORDER — MINERAL OIL
1 OIL (ML) MISCELLANEOUS
Qty: 0 | Refills: 0 | Status: DISCONTINUED | OUTPATIENT
Start: 2017-10-10 | End: 2017-10-12

## 2017-10-10 RX ADMIN — ATORVASTATIN CALCIUM 10 MILLIGRAM(S): 80 TABLET, FILM COATED ORAL at 21:53

## 2017-10-10 RX ADMIN — OXYCODONE AND ACETAMINOPHEN 2 TABLET(S): 5; 325 TABLET ORAL at 18:17

## 2017-10-10 RX ADMIN — OXYCODONE AND ACETAMINOPHEN 2 TABLET(S): 5; 325 TABLET ORAL at 16:42

## 2017-10-10 RX ADMIN — Medication 40 MILLIGRAM(S): at 06:24

## 2017-10-10 RX ADMIN — Medication 1 APPLICATION(S): at 19:58

## 2017-10-10 RX ADMIN — Medication 250 MILLIGRAM(S): at 19:58

## 2017-10-10 RX ADMIN — PIPERACILLIN AND TAZOBACTAM 25 GRAM(S): 4; .5 INJECTION, POWDER, LYOPHILIZED, FOR SOLUTION INTRAVENOUS at 21:53

## 2017-10-10 RX ADMIN — Medication 81 MILLIGRAM(S): at 12:21

## 2017-10-10 RX ADMIN — Medication 25 MILLIGRAM(S): at 06:24

## 2017-10-10 RX ADMIN — PIPERACILLIN AND TAZOBACTAM 25 GRAM(S): 4; .5 INJECTION, POWDER, LYOPHILIZED, FOR SOLUTION INTRAVENOUS at 06:24

## 2017-10-10 RX ADMIN — Medication 250 MILLIGRAM(S): at 06:24

## 2017-10-10 RX ADMIN — PIPERACILLIN AND TAZOBACTAM 25 GRAM(S): 4; .5 INJECTION, POWDER, LYOPHILIZED, FOR SOLUTION INTRAVENOUS at 13:59

## 2017-10-10 RX ADMIN — RIVAROXABAN 20 MILLIGRAM(S): KIT at 16:42

## 2017-10-10 NOTE — PROGRESS NOTE ADULT - PROBLEM SELECTOR PLAN 2
c/w asa, statin, bbl  Echo with preserved EF  likely need ischemic cardiac w/u & EPS as outpatient once pt is clinically stable   Pt f/u with Dr. Mckenna as out patient c/w asa, statin, bbl  Echo with preserved EF  likely need ischemic cardiac w/u & EPS as outpatient once pt is clinically stable   Pt f/u with Dr. Suárez as out patient

## 2017-10-10 NOTE — PROGRESS NOTE ADULT - SUBJECTIVE AND OBJECTIVE BOX
Patient is a 77y old  Female who presents with a chief complaint of left leg swelling and weeping (08 Oct 2017 11:04)      PAST MEDICAL & SURGICAL HISTORY:  Bronchitis  Obesity (BMI 30-39.9)  HTN (hypertension)  Cyst, ovarian  S/P dilatation and curettage: in 1994  angiogram of RLE to R/O DVT-  ~ 3-4 years ago- Negative test: angiogram of RLE to R/O DVT-  ~ 3-4 years ago- Negative test      INTERVAL HISTORY: Resting in bed, not in any acute distress  	  MEDICATIONS:  MEDICATIONS  (STANDING):  aspirin enteric coated 81 milliGRAM(s) Oral daily  atorvastatin 10 milliGRAM(s) Oral at bedtime  furosemide    Tablet 40 milliGRAM(s) Oral daily  metoprolol succinate ER 25 milliGRAM(s) Oral daily  mineral oil for Topical Use 1 Application(s) Topical two times a day  piperacillin/tazobactam IVPB. 3.375 Gram(s) IV Intermittent every 8 hours  rivaroxaban 20 milliGRAM(s) Oral every 24 hours  vancomycin  IVPB 1000 milliGRAM(s) IV Intermittent every 12 hours    MEDICATIONS  (PRN):  oxyCODONE    5 mG/acetaminophen 325 mG 2 Tablet(s) Oral every 6 hours PRN Moderate Pain (4 - 6)      Vitals:  T(F): 98.8 (10-10-17 @ 11:29), Max: 99.8 (10-09-17 @ 12:35)  HR: 85 (10-10-17 @ 11:29) (84 - 85)  BP: 106/55 (10-10-17 @ 11:29) (99/51 - 119/61)  RR: 17 (10-10-17 @ 11:29) (16 - 20)  SpO2: 97% (10-10-17 @ 11:29) (96% - 98%)  Wt(kg): -- 73.7 kg    10-09 @ 07:01  -  10-10 @ 07:00  --------------------------------------------------------  IN:    Oral Fluid: 1140 mL  Total IN: 1140 mL    OUT:  Total OUT: 0 mL    Total NET: 1140 mL        PHYSICAL EXAM:  Neuro: Awake, responsive  CV: S1 S2 RRR  Lungs: CTABL  GI: Soft, BS +, ND, NT  Extremities:  + edema and dark skin discoloration to lower legs with dry peeling skin to left lower leg    TELEMETRY: Atrial flutter  	  RADIOLOGY: < from: Xray Chest 1 View AP/PA. (10.04.17 @ 18:52) >    There are no focal air space opacities. The cardiac and mediastinal   contours are prominent, which may be due to magnification from AP   technique and shallow inspiration. The osseous structures are intact.    IMPRESSION:    No focal air space opacities.    < end of copied text >      DIAGNOSTIC TESTING:    [x ] Echocardiogram: < from: Transthoracic Echocardiogram (07.19.17 @ 14:49) >  1. Mitral annular calcification, otherwise normal mitral  valve. Mild mitral regurgitation.  2. Mildly dilated left atrium.  LA volume index = 35 cc/m2.  3. Normal left ventricular internal dimensions and wall  thicknesses.  4. Normal left ventricular systolic function. No segmental  wall motion abnormalities.  5. Normal right ventricular size and function.  6. Estimated right ventricular systolic pressure equals 42  mm Hg, assuming right atrial pressure equals 10 mm Hg,  consistent with mild pulmonary hypertension.    < end of copied text >    LABS:	 	    10 Oct 2017 06:47    140    |  102    |  16     ----------------------------<  86     3.8     |  29     |  0.76   09 Oct 2017 04:55    141    |  104    |  18     ----------------------------<  97     3.9     |  27     |  0.83   08 Oct 2017 07:12    140    |  103    |  22     ----------------------------<  99     3.8     |  27     |  0.84     Ca    8.2        10 Oct 2017 06:47    TPro  6.6    /  Alb  1.7    /  TBili  0.8    /  DBili  x      /  AST  23     /  ALT  31     /  AlkPhos  171    10 Oct 2017 06:47                          9.5    16.0  )-----------( 447      ( 10 Oct 2017 06:47 )             27.8 ,                       9.1    18.0  )-----------( 477      ( 09 Oct 2017 04:55 )             28.0 ,                       9.7    18.2  )-----------( 428      ( 08 Oct 2017 07:12 )             29.0 Patient is a 77y old  Female who presents with a chief complaint of left leg swelling and weeping (08 Oct 2017 11:04)      PAST MEDICAL & SURGICAL HISTORY:  Bronchitis  Obesity (BMI 30-39.9)  HTN (hypertension)  Cyst, ovarian  S/P dilatation and curettage: in 1994  angiogram of RLE to R/O DVT-  ~ 3-4 years ago- Negative test: angiogram of RLE to R/O DVT-  ~ 3-4 years ago- Negative test      INTERVAL HISTORY: Resting in chair, not in any acute distress, denies any sob or chest pain at present, c/o mild pain in lower legs, increases with movement  	  MEDICATIONS:  MEDICATIONS  (STANDING):  aspirin enteric coated 81 milliGRAM(s) Oral daily  atorvastatin 10 milliGRAM(s) Oral at bedtime  furosemide    Tablet 40 milliGRAM(s) Oral daily  metoprolol succinate ER 25 milliGRAM(s) Oral daily  mineral oil for Topical Use 1 Application(s) Topical two times a day  piperacillin/tazobactam IVPB. 3.375 Gram(s) IV Intermittent every 8 hours  rivaroxaban 20 milliGRAM(s) Oral every 24 hours  vancomycin  IVPB 1000 milliGRAM(s) IV Intermittent every 12 hours    MEDICATIONS  (PRN):  oxyCODONE    5 mG/acetaminophen 325 mG 2 Tablet(s) Oral every 6 hours PRN Moderate Pain (4 - 6)      Vitals:  T(F): 98.8 (10-10-17 @ 11:29), Max: 99.8 (10-09-17 @ 12:35)  HR: 85 (10-10-17 @ 11:29) (84 - 85)  BP: 106/55 (10-10-17 @ 11:29) (99/51 - 119/61)  RR: 17 (10-10-17 @ 11:29) (16 - 20)  SpO2: 97% (10-10-17 @ 11:29) (96% - 98%)  Wt(kg): -- 73.7 kg    10-09 @ 07:01  -  10-10 @ 07:00  --------------------------------------------------------  IN:    Oral Fluid: 1140 mL  Total IN: 1140 mL    OUT:  Total OUT: 0 mL    Total NET: 1140 mL    PHYSICAL EXAM:  Neuro: Awake, responsive  CV: S1 S2 RRR  Lungs: diminished to basesL  GI: Soft, BS +, ND, NT  Extremities:  + edema and dark  discoloration to lower legs with dry skin and skin peeling off in left lower leg    TELEMETRY: Atrial flutter  	  RADIOLOGY: < from: Xray Chest 1 View AP/PA. (10.04.17 @ 18:52) >    There are no focal air space opacities. The cardiac and mediastinal   contours are prominent, which may be due to magnification from AP   technique and shallow inspiration. The osseous structures are intact.    IMPRESSION:    No focal air space opacities.    < end of copied text >      DIAGNOSTIC TESTING:    [x ] Echocardiogram: < from: Transthoracic Echocardiogram (07.19.17 @ 14:49) >  1. Mitral annular calcification, otherwise normal mitral  valve. Mild mitral regurgitation.  2. Mildly dilated left atrium.  LA volume index = 35 cc/m2.  3. Normal left ventricular internal dimensions and wall  thicknesses.  4. Normal left ventricular systolic function. No segmental  wall motion abnormalities.  5. Normal right ventricular size and function.  6. Estimated right ventricular systolic pressure equals 42  mm Hg, assuming right atrial pressure equals 10 mm Hg,  consistent with mild pulmonary hypertension.    < end of copied text >    LABS:	 	    10 Oct 2017 06:47    140    |  102    |  16     ----------------------------<  86     3.8     |  29     |  0.76   09 Oct 2017 04:55    141    |  104    |  18     ----------------------------<  97     3.9     |  27     |  0.83   08 Oct 2017 07:12    140    |  103    |  22     ----------------------------<  99     3.8     |  27     |  0.84     Ca    8.2        10 Oct 2017 06:47    TPro  6.6    /  Alb  1.7    /  TBili  0.8    /  DBili  x      /  AST  23     /  ALT  31     /  AlkPhos  171    10 Oct 2017 06:47                          9.5    16.0  )-----------( 447      ( 10 Oct 2017 06:47 )             27.8 ,                       9.1    18.0  )-----------( 477      ( 09 Oct 2017 04:55 )             28.0 ,                       9.7    18.2  )-----------( 428      ( 08 Oct 2017 07:12 )             29.0

## 2017-10-10 NOTE — PROGRESS NOTE ADULT - ASSESSMENT
76yo female with a PMH of HTN, Aflutter on Xarelto; admitted c/o BLE edema w/ weeping X 1 week with difficulty walking, associated with intermittent dyspnea on exertion; patient is normally on lasix and compliant but two days ago started to get pain and erythema with increased swelling. She denies fever/chills.  Denied chest pain/palpitations/syncope. In ER, troponin mildly elevated but found to be in Aflutter with RVR.... HRs to 120-130s.  Given bbs/lasix. LE doppler negative for DVT. ECG/tele:  Aflutter 90-120s with rate-related changes. Also noted with Cellulitis of left lower extremity. started on IV Zosyn, vanco. Evaluated by ID. bcx No growth to date. EF-66%.As per cardiology- EP/Ischemic evaluation as outpt

## 2017-10-10 NOTE — PROGRESS NOTE ADULT - PROBLEM SELECTOR PLAN 2
-cont on beta blockers, xarelto, ASA, statin  -cont telemetry   -cardiology on board, As per cards=likely need ischemic cardiac w/u & EPS as outpatient once pt is clinically stable   Pt f/u with Dr. Suárez as out patient.

## 2017-10-10 NOTE — PROGRESS NOTE ADULT - SUBJECTIVE AND OBJECTIVE BOX
Patient is a 77y old  Female who presents with a chief complaint of left leg swelling and weeping (08 Oct 2017 11:04)       OVERNIGHT EVENTS: no events    MEDICATIONS  (STANDING):  aspirin enteric coated 81 milliGRAM(s) Oral daily  atorvastatin 10 milliGRAM(s) Oral at bedtime  furosemide    Tablet 40 milliGRAM(s) Oral daily  metoprolol succinate ER 25 milliGRAM(s) Oral daily  mineral oil for Topical Use 1 Application(s) Topical two times a day  piperacillin/tazobactam IVPB. 3.375 Gram(s) IV Intermittent every 8 hours  rivaroxaban 20 milliGRAM(s) Oral every 24 hours  vancomycin  IVPB 1000 milliGRAM(s) IV Intermittent every 12 hours    MEDICATIONS  (PRN):  oxyCODONE    5 mG/acetaminophen 325 mG 2 Tablet(s) Oral every 6 hours PRN Moderate Pain (4 - 6)    Vital Signs Last 24 Hrs  T(C): 37.1 (10 Oct 2017 11:29), Max: 37.7 (09 Oct 2017 17:24)  T(F): 98.8 (10 Oct 2017 11:29), Max: 99.8 (09 Oct 2017 17:24)  HR: 85 (10 Oct 2017 11:29) (84 - 85)  BP: 106/55 (10 Oct 2017 11:29) (106/55 - 124/65)  BP(mean): --  RR: 17 (10 Oct 2017 11:29) (17 - 20)  SpO2: 97% (10 Oct 2017 11:29) (96% - 98%)     PHYSICAL EXAM:  GENERAL: NAD, well-groomed, well-developed  HEAD:  Atraumatic, Normocephalic  EYES: EOMI, PERRLA, conjunctiva and sclera clear  ENMT: No tonsillar erythema, exudates, or enlargement; Moist mucous membranes   NECK: Supple, No JVD   NERVOUS SYSTEM:  Alert & Oriented X3, decreased ROM LE 2/2 to pain   CHEST/LUNG: Clear to auscultation  bilaterally; No rales, rhonchi, wheezing, or rubs  HEART: S1 S2, Irregular  ABDOMEN: Soft, Nontender, Nondistended; Bowel sounds present  EXTREMITIES:  trace edema b/l LE, very dry scaling skin b/l LE left>Rt, warm/ tender to touch , no ulcers,2+pulses    LABS:                        9.5    16.0  )-----------( 447      ( 10 Oct 2017 06:47 )             27.8     10-10    140  |  102  |  16  ----------------------------<  86  3.8   |  29  |  0.76    Ca    8.2<L>      10 Oct 2017 06:47    TPro  6.6  /  Alb  1.7<L>  /  TBili  0.8  /  DBili  x   /  AST  23  /  ALT  31  /  AlkPhos  171<H>  10-10       cardiac markers     CAPILLARY BLOOD GLUCOSE        Cultures    RADIOLOGY & ADDITIONAL TESTS:    Imaging Personally Reviewed:  [ x] YES  [ ] NO    Consultant(s) Notes Reviewed:  [ x] YES  [ ] NO    Care Discussed with Consultants/Other Providers [x ] YES  [ ] NO

## 2017-10-10 NOTE — PROGRESS NOTE ADULT - PROBLEM SELECTOR PLAN 4
-cont on IV lasix  -Monitor I/O's  -daily weight monitoring -switched to PO  lasix  -Monitor I/O's  -daily weight monitoring

## 2017-10-10 NOTE — PROGRESS NOTE ADULT - PROBLEM SELECTOR PLAN 1
d/c IV vanco and zosyn soon and switch to oral antibiotics   swelling resolved ,now skin is peeling off extensively which will come off spontaneously  apply mineral oil   pt had outpt w/u with vascular who recommend medical management like stockings etc.  No need of CT leg at this point

## 2017-10-10 NOTE — PROGRESS NOTE ADULT - ASSESSMENT
78yo lady with a PMH of HTN, Aflutter on Xarelto; admitted c/o BLE edema w/ weeping X 1 week with difficulty walking, associated with intermittent dyspnea on exertion

## 2017-10-10 NOTE — PROGRESS NOTE ADULT - PROBLEM SELECTOR PLAN 1
ABx as per ID  leukocytosis improving  Skin now is dark and dry with skin peeling off in left leg  ? med epps ABx as per ID  leukocytosis improving  Skin now is dark and dry with skin peeling off in left leg  ? dc Lasix  ?vascular consult ABx as per ID  leukocytosis improving  Skin now is dark and dry with skin peeling off in left leg  ? dc /decrease dose of Lasix  ?vascular consult

## 2017-10-10 NOTE — PROGRESS NOTE ADULT - SUBJECTIVE AND OBJECTIVE BOX
Patient is a 77y old  Female who presents with a chief complaint of left leg swelling and weeping (08 Oct 2017 11:04)      INTERVAL HPI / OVERNIGHT EVENTS: pain is improving. no fever    MEDICATIONS  (STANDING):  aspirin enteric coated 81 milliGRAM(s) Oral daily  atorvastatin 10 milliGRAM(s) Oral at bedtime  furosemide    Tablet 40 milliGRAM(s) Oral daily  metoprolol succinate ER 25 milliGRAM(s) Oral daily  piperacillin/tazobactam IVPB. 3.375 Gram(s) IV Intermittent every 8 hours  rivaroxaban 20 milliGRAM(s) Oral every 24 hours  vancomycin  IVPB 1000 milliGRAM(s) IV Intermittent every 12 hours    MEDICATIONS  (PRN):  oxyCODONE    5 mG/acetaminophen 325 mG 2 Tablet(s) Oral every 6 hours PRN Moderate Pain (4 - 6)      Vital Signs Last 24 Hrs  T(C): 37.7 (09 Oct 2017 17:24), Max: 37.7 (09 Oct 2017 12:35)  T(F): 99.8 (09 Oct 2017 17:24), Max: 99.8 (09 Oct 2017 12:35)  HR: 84 (09 Oct 2017 17:24) (84 - 86)  BP: 116/61 (09 Oct 2017 17:24) (99/51 - 116/61)  BP(mean): --  RR: 18 (09 Oct 2017 17:24) (16 - 18)  SpO2: 96% (09 Oct 2017 17:24) (96% - 100%)    PHYSICAL EXAM:  General :NAD  Constitutional:  well-groomed, well-developed  Respiratory: CTAB/L  Cardiovascular: S1 and S2, RRR, no M/G/R  Gastrointestinal: BS+, soft, NT/ND  Extremities/skin: swelling reducing and tree bark peeling of skin   Vascular: left foot dorsalis pedis feeble        LABS:           WBC 18-->16            MICROBIOLOGY:  RECENT CULTURES:  10-05 .Urine Clean Catch (Midstream) XXXX XXXX   No growth    10-04 .Blood Blood-Peripheral XXXX XXXX   No growth to date.    10-04 .Blood Blood-Peripheral XXXX XXXX   No growth to date.          RADIOLOGY & ADDITIONAL STUDIES: Patient is a 77y old  Female who presents with a chief complaint of left leg swelling and weeping (08 Oct 2017 11:04)      INTERVAL HPI / OVERNIGHT EVENTS: pain is improving. no fever    MEDICATIONS  (STANDING):  aspirin enteric coated 81 milliGRAM(s) Oral daily  atorvastatin 10 milliGRAM(s) Oral at bedtime  furosemide    Tablet 40 milliGRAM(s) Oral daily  metoprolol succinate ER 25 milliGRAM(s) Oral daily  piperacillin/tazobactam IVPB. 3.375 Gram(s) IV Intermittent every 8 hours  rivaroxaban 20 milliGRAM(s) Oral every 24 hours  vancomycin  IVPB 1000 milliGRAM(s) IV Intermittent every 12 hours    MEDICATIONS  (PRN):  oxyCODONE    5 mG/acetaminophen 325 mG 2 Tablet(s) Oral every 6 hours PRN Moderate Pain (4 - 6)      Vital Signs Last 24 Hrs  Tmax :afebrile  HR: 84 (09 Oct 2017 17:24) (84 - 86)  BP: 116/61 (09 Oct 2017 17:24) (99/51 - 116/61)  BP(mean): --  RR: 18 (09 Oct 2017 17:24) (16 - 18)  SpO2: 96% (09 Oct 2017 17:24) (96% - 100%)    PHYSICAL EXAM:  General :NAD  Constitutional:  well-groomed, well-developed  Respiratory: CTAB/L  Cardiovascular: S1 and S2, RRR, no M/G/R  Gastrointestinal: BS+, soft, NT/ND  Extremities/skin: swelling reducing and tree bark peeling of skin   Vascular: left foot dorsalis pedis feeble        LABS:           WBC 18-->16            MICROBIOLOGY:  RECENT CULTURES:  10-05 .Urine Clean Catch (Midstream) XXXX XXXX   No growth    10-04 .Blood Blood-Peripheral XXXX XXXX   No growth to date.    10-04 .Blood Blood-Peripheral XXXX XXXX   No growth to date.          RADIOLOGY & ADDITIONAL STUDIES:

## 2017-10-10 NOTE — PROGRESS NOTE ADULT - PROBLEM SELECTOR PLAN 1
- Swelling resolved and pain  improving as per pt  - On zosyn & vancomycin IV   -  Blood Cx negative so far   - WBC trending down ,24-->16   - ID on board, Discussed with   - As per pt she was seen by vascular doc as outpt and was suggested compression stockings, dry ,scaly,dark, peeling skin likely venous component , consulted    - out of bed to chair, elevate legs, pain control, PT eval for functional capacity  - mineral oil BID

## 2017-10-11 LAB
ANION GAP SERPL CALC-SCNC: 8 MMOL/L — SIGNIFICANT CHANGE UP (ref 5–17)
BUN SERPL-MCNC: 13 MG/DL — SIGNIFICANT CHANGE UP (ref 7–23)
CALCIUM SERPL-MCNC: 8.3 MG/DL — LOW (ref 8.5–10.1)
CHLORIDE SERPL-SCNC: 102 MMOL/L — SIGNIFICANT CHANGE UP (ref 96–108)
CO2 SERPL-SCNC: 28 MMOL/L — SIGNIFICANT CHANGE UP (ref 22–31)
CREAT SERPL-MCNC: 0.7 MG/DL — SIGNIFICANT CHANGE UP (ref 0.5–1.3)
GLUCOSE SERPL-MCNC: 99 MG/DL — SIGNIFICANT CHANGE UP (ref 70–99)
HCT VFR BLD CALC: 26.5 % — LOW (ref 34.5–45)
HGB BLD-MCNC: 8.9 G/DL — LOW (ref 11.5–15.5)
MCHC RBC-ENTMCNC: 27.5 PG — SIGNIFICANT CHANGE UP (ref 27–34)
MCHC RBC-ENTMCNC: 33.6 GM/DL — SIGNIFICANT CHANGE UP (ref 32–36)
MCV RBC AUTO: 81.8 FL — SIGNIFICANT CHANGE UP (ref 80–100)
PLATELET # BLD AUTO: 437 K/UL — HIGH (ref 150–400)
POTASSIUM SERPL-MCNC: 3.6 MMOL/L — SIGNIFICANT CHANGE UP (ref 3.5–5.3)
POTASSIUM SERPL-SCNC: 3.6 MMOL/L — SIGNIFICANT CHANGE UP (ref 3.5–5.3)
RBC # BLD: 3.24 M/UL — LOW (ref 3.8–5.2)
RBC # FLD: 16 % — HIGH (ref 11–15)
SODIUM SERPL-SCNC: 138 MMOL/L — SIGNIFICANT CHANGE UP (ref 135–145)
WBC # BLD: 12.8 K/UL — HIGH (ref 3.8–10.5)
WBC # FLD AUTO: 12.8 K/UL — HIGH (ref 3.8–10.5)

## 2017-10-11 PROCEDURE — 99233 SBSQ HOSP IP/OBS HIGH 50: CPT

## 2017-10-11 RX ORDER — ACETAMINOPHEN 500 MG
650 TABLET ORAL EVERY 6 HOURS
Qty: 0 | Refills: 0 | Status: DISCONTINUED | OUTPATIENT
Start: 2017-10-11 | End: 2017-10-12

## 2017-10-11 RX ORDER — CEPHALEXIN 500 MG
1 CAPSULE ORAL
Qty: 10 | Refills: 0 | OUTPATIENT
Start: 2017-10-11 | End: 2017-10-16

## 2017-10-11 RX ORDER — IPRATROPIUM/ALBUTEROL SULFATE 18-103MCG
0 AEROSOL WITH ADAPTER (GRAM) INHALATION
Qty: 0 | Refills: 0 | COMMUNITY

## 2017-10-11 RX ORDER — ASPIRIN/CALCIUM CARB/MAGNESIUM 324 MG
1 TABLET ORAL
Qty: 0 | Refills: 0 | DISCHARGE
Start: 2017-10-11

## 2017-10-11 RX ORDER — POLYETHYLENE GLYCOL 3350 17 G/17G
17 POWDER, FOR SOLUTION ORAL DAILY
Qty: 0 | Refills: 0 | Status: DISCONTINUED | OUTPATIENT
Start: 2017-10-11 | End: 2017-10-12

## 2017-10-11 RX ORDER — RIVAROXABAN 15 MG-20MG
1 KIT ORAL
Qty: 0 | Refills: 0 | COMMUNITY

## 2017-10-11 RX ORDER — ACETAMINOPHEN AND CHLORPHENIRAMINE MALEATE 325; 2 MG/1; MG/1
0 TABLET ORAL
Qty: 0 | Refills: 0 | COMMUNITY

## 2017-10-11 RX ORDER — LACTULOSE 10 G/15ML
15 SOLUTION ORAL
Qty: 0 | Refills: 0 | Status: DISCONTINUED | OUTPATIENT
Start: 2017-10-11 | End: 2017-10-12

## 2017-10-11 RX ORDER — METOPROLOL TARTRATE 50 MG
1 TABLET ORAL
Qty: 0 | Refills: 0 | DISCHARGE
Start: 2017-10-11

## 2017-10-11 RX ADMIN — RIVAROXABAN 20 MILLIGRAM(S): KIT at 18:12

## 2017-10-11 RX ADMIN — Medication 1 APPLICATION(S): at 05:34

## 2017-10-11 RX ADMIN — PIPERACILLIN AND TAZOBACTAM 25 GRAM(S): 4; .5 INJECTION, POWDER, LYOPHILIZED, FOR SOLUTION INTRAVENOUS at 21:39

## 2017-10-11 RX ADMIN — Medication 1 APPLICATION(S): at 18:12

## 2017-10-11 RX ADMIN — Medication 25 MILLIGRAM(S): at 05:35

## 2017-10-11 RX ADMIN — Medication 81 MILLIGRAM(S): at 11:57

## 2017-10-11 RX ADMIN — Medication 40 MILLIGRAM(S): at 05:33

## 2017-10-11 RX ADMIN — POLYETHYLENE GLYCOL 3350 17 GRAM(S): 17 POWDER, FOR SOLUTION ORAL at 11:57

## 2017-10-11 RX ADMIN — PIPERACILLIN AND TAZOBACTAM 25 GRAM(S): 4; .5 INJECTION, POWDER, LYOPHILIZED, FOR SOLUTION INTRAVENOUS at 13:14

## 2017-10-11 RX ADMIN — Medication 250 MILLIGRAM(S): at 18:12

## 2017-10-11 RX ADMIN — LACTULOSE 15 GRAM(S): 10 SOLUTION ORAL at 21:45

## 2017-10-11 RX ADMIN — Medication 1 ENEMA: at 11:57

## 2017-10-11 RX ADMIN — Medication 250 MILLIGRAM(S): at 05:18

## 2017-10-11 RX ADMIN — PIPERACILLIN AND TAZOBACTAM 25 GRAM(S): 4; .5 INJECTION, POWDER, LYOPHILIZED, FOR SOLUTION INTRAVENOUS at 05:34

## 2017-10-11 NOTE — PROGRESS NOTE ADULT - PROBLEM SELECTOR PROBLEM 7
Obesity (BMI 30-39.9)
Obesity (BMI 30-39.9)
Left upper quadrant pain
Obesity (BMI 30-39.9)

## 2017-10-11 NOTE — PROGRESS NOTE ADULT - SUBJECTIVE AND OBJECTIVE BOX
Patient is a 77y old  Female who presents with a chief complaint of left leg swelling and weeping (08 Oct 2017 11:04)       OVERNIGHT EVENTS: no events    MEDICATIONS  (STANDING):  aspirin enteric coated 81 milliGRAM(s) Oral daily  furosemide    Tablet 40 milliGRAM(s) Oral daily  metoprolol succinate ER 25 milliGRAM(s) Oral daily  mineral oil for Topical Use 1 Application(s) Topical two times a day  piperacillin/tazobactam IVPB. 3.375 Gram(s) IV Intermittent every 8 hours  polyethylene glycol 3350 17 Gram(s) Oral daily  rivaroxaban 20 milliGRAM(s) Oral every 24 hours  vancomycin  IVPB 1000 milliGRAM(s) IV Intermittent every 12 hours    MEDICATIONS  (PRN):  acetaminophen   Tablet. 650 milliGRAM(s) Oral every 6 hours PRN Mild Pain (1 - 3)       Vital Signs Last 24 Hrs  T(C): 37.2 (11 Oct 2017 11:14), Max: 37.8 (11 Oct 2017 05:32)  T(F): 99 (11 Oct 2017 11:14), Max: 100 (11 Oct 2017 05:32)  HR: 83 (11 Oct 2017 11:14) (83 - 86)  BP: 118/58 (11 Oct 2017 11:14) (103/47 - 118/58)  BP(mean): --  RR: 18 (11 Oct 2017 11:14) (16 - 18)  SpO2: 97% (11 Oct 2017 11:14) (94% - 98%)     PHYSICAL EXAM:  GENERAL: NAD, well-groomed, well-developed  HEAD:  Atraumatic, Normocephalic  EYES: EOMI, PERRLA, conjunctiva and sclera clear  ENMT: No tonsillar erythema, exudates, or enlargement; Moist mucous membranes   NECK: Supple, No JVD   NERVOUS SYSTEM:  Alert & Oriented X3, decreased ROM LE 2/2 to pain   CHEST/LUNG: Clear to auscultation  bilaterally; No rales, rhonchi, wheezing, or rubs  HEART: S1 S2, Irregular  ABDOMEN: Soft, Nontender, Nondistended; Bowel sounds present  EXTREMITIES:  trace edema b/l LE, very dry scaling skin b/l LE left>Rt, warm/ tender to touch , no ulcers,2+pulses    LABS:                        8.9    12.8  )-----------( 437      ( 11 Oct 2017 08:05 )             26.5     10-11    138  |  102  |  13  ----------------------------<  99  3.6   |  28  |  0.70    Ca    8.3<L>      11 Oct 2017 08:05    TPro  6.6  /  Alb  1.7<L>  /  TBili  0.8  /  DBili  x   /  AST  23  /  ALT  31  /  AlkPhos  171<H>  10-10       cardiac markers     CAPILLARY BLOOD GLUCOSE        Cultures    RADIOLOGY & ADDITIONAL TESTS:    Imaging Personally Reviewed:  [x ] YES  [ ] NO    Consultant(s) Notes Reviewed:  [x ] YES  [ ] NO    Care Discussed with Consultants/Other Providers [x ] YES  [ ] NO

## 2017-10-11 NOTE — PROGRESS NOTE ADULT - PROBLEM SELECTOR PLAN 1
- Swelling resolved and pain  improving as per pt  - On zosyn & vancomycin IV   -  Blood Cx negative so far   - WBC trending down ,24-->12   - ID on board, Discussed with   - As per pt she was seen by vascular doc as outpt and was suggested compression stockings, dry ,scaly,dark, peeling skin likely venous component , consulted    - out of bed to chair, elevate legs, pain control, PT eval for functional capacity  - mineral oil BID  -pt ready to be discharged, prescriptions for PO ABX sent , discussed w/ daughter

## 2017-10-11 NOTE — PROGRESS NOTE ADULT - PROBLEM SELECTOR PLAN 6
- repeat LFTs, monitor  - CT abd noted
-gastroenterologist consult
-monitor

## 2017-10-11 NOTE — DIETITIAN INITIAL EVALUATION ADULT. - PERTINENT MEDS FT
Ecotrin, Xarelto, Zosyn, Vanco, Lasix, Mineral oil, Lasix, Lipitor, Percocet, Toprol XL, Fleet enema & Miralax ordered (10/10)

## 2017-10-11 NOTE — PROGRESS NOTE ADULT - PROBLEM SELECTOR PLAN 1
d/c IV vanco and zosyn today  and switch to oral doxy + keflex  swelling resolved, now skin is peeling off extensively which will come off spontaneously  apply mineral oil   pt had outpt w/u with vascular who recommend medical management like stockings etc  No need of CT leg at this point

## 2017-10-11 NOTE — PROGRESS NOTE ADULT - PROBLEM SELECTOR PROBLEM 6
Elevated liver function tests

## 2017-10-11 NOTE — DIETITIAN INITIAL EVALUATION ADULT. - PERTINENT LABORATORY DATA
10-11 Na 138 mmol/L Glu 99 mg/dL K+ 3.6 mmol/L Cr  0.70 mg/dL BUN 13 mg/dL Phos n/a   Alb n/a   PAB n/a   Hgb 8.9 g/dL<L> Hct 26.5 %<L>, Alb=1.7(10/10), WBC=12.8

## 2017-10-11 NOTE — DIETITIAN INITIAL EVALUATION ADULT. - OTHER INFO
Pt seen for LOS.  Pt's daughter & HHA does cooking & food shopping. Pt with cellulitis LLE with BLE edema. Pt with wt loss during hospitalization with 2.6kg(2%) x 5 days due to Lasix with decreased edema. Pt presents with no BM x 1 week. Pt reports taking Miralax PTA; NP & RN informed & Miralax & Fleet enema. Pt agreed to take prunes daily. Pt reports no difficulty chewing or swallowing.

## 2017-10-11 NOTE — PROGRESS NOTE ADULT - PROBLEM SELECTOR PLAN 7
-lifestyle modifications
-lifestyle modifications
-abdominal ct noted
-abdominal ct noted, pain mgmt
-lifestyle modifications

## 2017-10-12 VITALS
SYSTOLIC BLOOD PRESSURE: 117 MMHG | OXYGEN SATURATION: 96 % | HEART RATE: 76 BPM | DIASTOLIC BLOOD PRESSURE: 72 MMHG | TEMPERATURE: 98 F | RESPIRATION RATE: 16 BRPM

## 2017-10-12 LAB — VANCOMYCIN TROUGH SERPL-MCNC: 17.9 UG/ML — SIGNIFICANT CHANGE UP (ref 10–20)

## 2017-10-12 PROCEDURE — 99239 HOSP IP/OBS DSCHRG MGMT >30: CPT

## 2017-10-12 RX ORDER — CEPHALEXIN 500 MG
1 CAPSULE ORAL
Qty: 10 | Refills: 0 | OUTPATIENT
Start: 2017-10-12 | End: 2017-10-17

## 2017-10-12 RX ORDER — COLLAGENASE CLOSTRIDIUM HIST. 250 UNIT/G
1 OINTMENT (GRAM) TOPICAL DAILY
Qty: 0 | Refills: 0 | Status: DISCONTINUED | OUTPATIENT
Start: 2017-10-12 | End: 2017-10-12

## 2017-10-12 RX ORDER — COLLAGENASE CLOSTRIDIUM HIST. 250 UNIT/G
1 OINTMENT (GRAM) TOPICAL
Qty: 1 | Refills: 0 | OUTPATIENT
Start: 2017-10-12 | End: 2017-10-26

## 2017-10-12 RX ORDER — CEPHALEXIN 500 MG
1 CAPSULE ORAL
Qty: 0 | Refills: 0 | COMMUNITY
End: 2017-10-17

## 2017-10-12 RX ADMIN — POLYETHYLENE GLYCOL 3350 17 GRAM(S): 17 POWDER, FOR SOLUTION ORAL at 11:41

## 2017-10-12 RX ADMIN — Medication 650 MILLIGRAM(S): at 03:10

## 2017-10-12 RX ADMIN — LACTULOSE 15 GRAM(S): 10 SOLUTION ORAL at 17:19

## 2017-10-12 RX ADMIN — RIVAROXABAN 20 MILLIGRAM(S): KIT at 17:19

## 2017-10-12 RX ADMIN — Medication 1 APPLICATION(S): at 17:18

## 2017-10-12 RX ADMIN — Medication 650 MILLIGRAM(S): at 11:44

## 2017-10-12 RX ADMIN — Medication 650 MILLIGRAM(S): at 02:09

## 2017-10-12 RX ADMIN — Medication 25 MILLIGRAM(S): at 05:19

## 2017-10-12 RX ADMIN — Medication 1 APPLICATION(S): at 05:19

## 2017-10-12 RX ADMIN — LACTULOSE 15 GRAM(S): 10 SOLUTION ORAL at 05:18

## 2017-10-12 RX ADMIN — PIPERACILLIN AND TAZOBACTAM 25 GRAM(S): 4; .5 INJECTION, POWDER, LYOPHILIZED, FOR SOLUTION INTRAVENOUS at 05:18

## 2017-10-12 RX ADMIN — Medication 1 APPLICATION(S): at 11:40

## 2017-10-12 RX ADMIN — Medication 40 MILLIGRAM(S): at 05:19

## 2017-10-12 RX ADMIN — Medication 81 MILLIGRAM(S): at 11:40

## 2017-10-12 RX ADMIN — Medication 250 MILLIGRAM(S): at 09:11

## 2017-10-12 RX ADMIN — PIPERACILLIN AND TAZOBACTAM 25 GRAM(S): 4; .5 INJECTION, POWDER, LYOPHILIZED, FOR SOLUTION INTRAVENOUS at 14:18

## 2017-10-12 NOTE — CONSULT NOTE ADULT - SUBJECTIVE AND OBJECTIVE BOX
Vascular Attending:    Pt seen and examined and both LE examined. pt c/o pain in the left leg, thickening of skin and pain left heel    HPI:  78yo F w/ pmh inclusive of htn, obesity, a. flutter on xarelto, c/o BLE edema w/ weeping X 1 week, intermittent dyspnea on exertion- patient is normally on lasix and yet two days ago styated to get pain and erythema with increased swelling. she denies fever . also complain of of intermittent abdominal pain with no nausea vomiting or diarrhea (04 Oct 2017 19:56)      PAST MEDICAL & SURGICAL HISTORY:  Bronchitis  Obesity (BMI 30-39.9)  HTN (hypertension)  Cyst, ovarian  S/P dilatation and curettage: in 1994  angiogram of RLE to R/O DVT-  ~ 3-4 years ago- Negative test: angiogram of RLE to R/O DVT-  ~ 3-4 years ago- Negative test        MEDICATIONS  (STANDING):  aspirin enteric coated 81 milliGRAM(s) Oral daily  collagenase Ointment 1 Application(s) Topical daily  furosemide    Tablet 40 milliGRAM(s) Oral daily  lactulose Syrup 15 Gram(s) Oral two times a day  metoprolol succinate ER 25 milliGRAM(s) Oral daily  mineral oil for Topical Use 1 Application(s) Topical two times a day  piperacillin/tazobactam IVPB. 3.375 Gram(s) IV Intermittent every 8 hours  polyethylene glycol 3350 17 Gram(s) Oral daily  rivaroxaban 20 milliGRAM(s) Oral every 24 hours  vancomycin  IVPB 1000 milliGRAM(s) IV Intermittent every 12 hours    MEDICATIONS  (PRN):  acetaminophen   Tablet. 650 milliGRAM(s) Oral every 6 hours PRN Mild Pain (1 - 3)      Allergies    No Known Allergies    Intolerances        SOCIAL HISTORY:      Vital Signs Last 24 Hrs  T(C): 36.2 (12 Oct 2017 05:32), Max: 37.3 (11 Oct 2017 23:46)  T(F): 97.1 (12 Oct 2017 05:32), Max: 99.2 (11 Oct 2017 23:46)  HR: 84 (12 Oct 2017 05:32) (83 - 86)  BP: 116/64 (12 Oct 2017 05:32) (112/59 - 118/58)  BP(mean): --  RR: 16 (12 Oct 2017 05:32) (16 - 18)  SpO2: 100% (12 Oct 2017 05:32) (96% - 100%)    P/E:-   CAROTIDS:- Bilateral carotids with no Bruits. No scars of previous catheterisation.  UPPER EXTREMITIES:- Bilateral radial artery pulses are normal and no ischemia of the Hands. No edema of the arms.  ABDOMEN:- No pulsatile mass in the abdomen and no ascites.  LOWER EXTREMITIES:- Bilateral LE with left  LE  Edema and  CVI, No varicose veins, and ulcer on the left heelThe arterial pulse are examined with palpation and Dopplers and the findings are as follows,    Wounds:- The pt has following wounds and measurements.  Pulses:   Right:                                                                          Left:  FEM yy[ ]2+ [ ]1+ [ ]doppler                                             FEM [ y]2+ [ ]1+ [ ]doppler    POP [ ]2+ [ ]1+ [ ]doppler                                             POP [ ]2+ [y ]1+ [ ]doppler    DP [ ]2+ [yy ]1+ [y ]doppler                                                DP [ ]2+ [ y]1+ [ ]doppler  PT[ ]2+ [ ]1+ [ y]doppler                                                  PT [ ]2+ [y ]1+ [ ]doppler      LABS:                        8.9    12.8  )-----------( 437      ( 11 Oct 2017 08:05 )             26.5     10-11    138  |  102  |  13  ----------------------------<  99  3.6   |  28  |  0.70    Ca    8.3<L>      11 Oct 2017 08:05            RADIOLOGY & ADDITIONAL STUDIES    EXAM:  US DPLX LWR EXT VEINS COMPL BI                            PROCEDURE DATE:  10/04/2017          INTERPRETATION:  CLINICAL INFORMATION: Severe lower extremity edema    COMPARISON: None available.    TECHNIQUE: Duplex sonography of the BILATERAL LOWER extremities with   color and spectral Doppler, with and without compression.      FINDINGS:    There is normal compressibility of the bilateral common femoral, femoral   and popliteal veins. No calf vein thrombosis is detected.    Doppler examination shows normal spontaneous and phasic flow.    There is left lower extremity soft tissue edema.    IMPRESSION:     No evidence of bilateral lower extremity deep venous thrombosis.      Impression and Plan: Patient has left heel unstageable 3x4 cms ulcer with necrotic dry skin, no sig cellulitis and has pedal pulse, there is extensive dry scalyy skin which is removed and uder it has resolvig cellulitis of the left lower leg and no calf tenderness.  Recommend to place santyl ointment locally and of loading.

## 2017-10-13 RX ORDER — COLLAGENASE CLOSTRIDIUM HIST. 250 UNIT/G
1 OINTMENT (GRAM) TOPICAL
Qty: 14 | Refills: 0
Start: 2017-10-13 | End: 2017-10-27

## 2017-10-13 NOTE — PROGRESS NOTE ADULT - SUBJECTIVE AND OBJECTIVE BOX
Patient is a 77y old  Female who presents with a chief complaint of left leg swelling and weeping (08 Oct 2017 11:04)      INTERVAL HPI / OVERNIGHT EVENTS:    MEDICATIONS  (STANDING):    MEDICATIONS  (PRN):      Vital Signs Last 24 Hrs  T(C): 36.6 (12 Oct 2017 17:48), Max: 36.6 (12 Oct 2017 17:48)  T(F): 97.9 (12 Oct 2017 17:48), Max: 97.9 (12 Oct 2017 17:48)  HR: 76 (12 Oct 2017 17:48) (76 - 87)  BP: 117/72 (12 Oct 2017 17:48) (115/60 - 130/63)  BP(mean): --  RR: 16 (12 Oct 2017 17:48) (16 - 19)  SpO2: 96% (12 Oct 2017 17:48) (96% - 97%)    PHYSICAL EXAM:  General :NAD  Respiratory: CTAB/L  Cardiovascular: S1 and S2, RRR, no M/G/R  Gastrointestinal: BS+, soft, NT/ND  Extremities: No peripheral edema  Vascular: 2+ peripheral pulses  Skin: dead bark like skin from left leg (removed ) underlying skin has mild edema but no obvious warmth or erythema .Ulcer on base of heel +      LABS:                MICROBIOLOGY:  RECENT CULTURES:        RADIOLOGY & ADDITIONAL STUDIES:

## 2017-10-13 NOTE — PROGRESS NOTE ADULT - PROBLEM SELECTOR PROBLEM 4
Peripheral edema
Atrial flutter with rapid ventricular response
Essential hypertension
Leg edema
Peripheral edema
Troponin level elevated
Essential hypertension
Peripheral edema

## 2017-10-13 NOTE — PROGRESS NOTE ADULT - PROVIDER SPECIALTY LIST ADULT
Cardiology
Hospitalist
Infectious Disease
Cardiology
Hospitalist
Hospitalist

## 2017-10-13 NOTE — PROGRESS NOTE ADULT - PROBLEM SELECTOR PROBLEM 1
Cellulitis of left lower extremity

## 2017-10-13 NOTE — PROGRESS NOTE ADULT - PROBLEM SELECTOR PLAN 1
d/c IV vanco and zosyn today and switch to oral doxy + keflex for 6 more days (total 14 days)  the dead skin bark like (removed by Vascular surgeon ) which exposed an heel ulcer which pt refused debriding so on santyl per his recommendation   apply mineral oil   pt had outpt w/u with vascular who recommend medical management like stockings etc

## 2017-10-13 NOTE — PROGRESS NOTE ADULT - PROBLEM SELECTOR PLAN 3
-likely 2/2 to A.flutter w/ RVR upon arrival in ED  -started on ASA,statin, c/w BB,   -discussed w/ -EP/Ischemic evaluation as outpt
-cont on IV lasix  -Monitor I/O's  -daily weight monitoring
-likely 2/2 to A.flutter w/ RVR upon arrival in ED  -  on ASA,statin,  BB,   -discussed w/ -EP/Ischemic evaluation as outpt
-supplemented, monitor
Rate controlled  c/w metoprolol XL  c/w Xarelto
cellulitis  on background of venous stasis of leg
Rate controlled  c/w metoprolol XL  c/w Xarelto
-likely 2/2 to A.flutter w/ RVR upon arrival in ED  -  on ASA,statin,  BB,   -discussed w/ -EP/Ischemic evaluation as outpt

## 2017-10-16 DIAGNOSIS — R79.89 OTHER SPECIFIED ABNORMAL FINDINGS OF BLOOD CHEMISTRY: ICD-10-CM

## 2017-10-16 DIAGNOSIS — R60.0 LOCALIZED EDEMA: ICD-10-CM

## 2017-10-16 DIAGNOSIS — R74.8 ABNORMAL LEVELS OF OTHER SERUM ENZYMES: ICD-10-CM

## 2017-10-16 DIAGNOSIS — I48.92 UNSPECIFIED ATRIAL FLUTTER: ICD-10-CM

## 2017-10-16 DIAGNOSIS — K57.90 DIVERTICULOSIS OF INTESTINE, PART UNSPECIFIED, WITHOUT PERFORATION OR ABSCESS WITHOUT BLEEDING: ICD-10-CM

## 2017-10-16 DIAGNOSIS — D72.829 ELEVATED WHITE BLOOD CELL COUNT, UNSPECIFIED: ICD-10-CM

## 2017-10-16 DIAGNOSIS — L03.116 CELLULITIS OF LEFT LOWER LIMB: ICD-10-CM

## 2017-10-16 DIAGNOSIS — E87.6 HYPOKALEMIA: ICD-10-CM

## 2017-10-16 DIAGNOSIS — E66.09 OTHER OBESITY DUE TO EXCESS CALORIES: ICD-10-CM

## 2017-10-16 DIAGNOSIS — L89.620 PRESSURE ULCER OF LEFT HEEL, UNSTAGEABLE: ICD-10-CM

## 2017-11-08 ENCOUNTER — APPOINTMENT (OUTPATIENT)
Dept: CARDIOLOGY | Facility: CLINIC | Age: 77
End: 2017-11-08
Payer: MEDICARE

## 2017-11-08 ENCOUNTER — NON-APPOINTMENT (OUTPATIENT)
Age: 77
End: 2017-11-08

## 2017-11-08 VITALS
SYSTOLIC BLOOD PRESSURE: 120 MMHG | WEIGHT: 171 LBS | HEART RATE: 90 BPM | BODY MASS INDEX: 34.47 KG/M2 | DIASTOLIC BLOOD PRESSURE: 70 MMHG | HEIGHT: 59 IN | OXYGEN SATURATION: 96 %

## 2017-11-08 DIAGNOSIS — S91.309A UNSPECIFIED OPEN WOUND, UNSPECIFIED FOOT, INITIAL ENCOUNTER: ICD-10-CM

## 2017-11-08 PROCEDURE — 93000 ELECTROCARDIOGRAM COMPLETE: CPT

## 2017-11-08 PROCEDURE — 99214 OFFICE O/P EST MOD 30 MIN: CPT

## 2017-11-09 PROBLEM — S91.309A WOUND, OPEN, FOOT: Status: ACTIVE | Noted: 2017-11-09

## 2018-02-21 ENCOUNTER — APPOINTMENT (OUTPATIENT)
Dept: CARDIOLOGY | Facility: CLINIC | Age: 78
End: 2018-02-21
Payer: MEDICARE

## 2018-02-21 ENCOUNTER — NON-APPOINTMENT (OUTPATIENT)
Age: 78
End: 2018-02-21

## 2018-02-21 VITALS
HEART RATE: 86 BPM | SYSTOLIC BLOOD PRESSURE: 120 MMHG | HEIGHT: 59 IN | OXYGEN SATURATION: 98 % | WEIGHT: 168 LBS | BODY MASS INDEX: 33.87 KG/M2 | DIASTOLIC BLOOD PRESSURE: 70 MMHG

## 2018-02-21 DIAGNOSIS — Z86.79 PERSONAL HISTORY OF OTHER DISEASES OF THE CIRCULATORY SYSTEM: ICD-10-CM

## 2018-02-21 DIAGNOSIS — E66.9 OBESITY, UNSPECIFIED: ICD-10-CM

## 2018-02-21 DIAGNOSIS — R60.0 LOCALIZED EDEMA: ICD-10-CM

## 2018-02-21 DIAGNOSIS — I87.303 CHRONIC VENOUS HYPERTENSION (IDIOPATHIC) W/OUT COMPLICATIONS OF BILATERAL LOWER EXTREMITY: ICD-10-CM

## 2018-02-21 PROCEDURE — 93000 ELECTROCARDIOGRAM COMPLETE: CPT

## 2018-02-21 PROCEDURE — 99214 OFFICE O/P EST MOD 30 MIN: CPT

## 2018-02-24 PROBLEM — Z86.79 HISTORY OF ATRIAL FLUTTER: Status: RESOLVED | Noted: 2017-11-08 | Resolved: 2018-02-24

## 2018-02-24 PROBLEM — R60.0 BILATERAL LEG EDEMA: Status: ACTIVE | Noted: 2017-11-08

## 2018-02-24 PROBLEM — I87.303 CHRONIC VENOUS HYPERTENSION INVOLVING BOTH SIDES: Status: ACTIVE | Noted: 2017-06-30

## 2018-02-24 PROBLEM — E66.9 MILD OBESITY: Status: ACTIVE | Noted: 2017-11-08

## 2018-06-01 ENCOUNTER — OUTPATIENT (OUTPATIENT)
Dept: OUTPATIENT SERVICES | Facility: HOSPITAL | Age: 78
LOS: 1 days | End: 2018-06-01

## 2018-06-15 ENCOUNTER — EMERGENCY (EMERGENCY)
Facility: HOSPITAL | Age: 78
LOS: 1 days | Discharge: HOME | End: 2018-06-15
Attending: EMERGENCY MEDICINE
Payer: MEDICARE

## 2018-06-15 VITALS
RESPIRATION RATE: 17 BRPM | HEART RATE: 85 BPM | OXYGEN SATURATION: 98 % | SYSTOLIC BLOOD PRESSURE: 126 MMHG | TEMPERATURE: 98 F | DIASTOLIC BLOOD PRESSURE: 68 MMHG

## 2018-06-15 VITALS
TEMPERATURE: 98 F | OXYGEN SATURATION: 99 % | SYSTOLIC BLOOD PRESSURE: 173 MMHG | HEART RATE: 128 BPM | DIASTOLIC BLOOD PRESSURE: 107 MMHG | RESPIRATION RATE: 17 BRPM

## 2018-06-15 PROCEDURE — 93005 ELECTROCARDIOGRAM TRACING: CPT

## 2018-06-15 PROCEDURE — 93010 ELECTROCARDIOGRAM REPORT: CPT

## 2018-06-15 PROCEDURE — 99283 EMERGENCY DEPT VISIT LOW MDM: CPT

## 2018-06-15 PROCEDURE — 99284 EMERGENCY DEPT VISIT MOD MDM: CPT | Mod: 25,GC

## 2018-06-15 NOTE — ED PROVIDER NOTE - NS ED ROS FT
CONST: no fevers, no chills  EYES: no pain  ENT: no sore throat   CV: no chest pain  RESP: no shortness of breath  ABD: no abdominal pain   : no dysuria  MSK: no back pain  NEURO: no headache or additional neurologic complaints  HEME: +bleeding in mouth  SKIN:  no rash

## 2018-06-15 NOTE — ED ADULT TRIAGE NOTE - CHIEF COMPLAINT QUOTE
Patient c/o bleeding from the mouth since 4 pm. Patient on Xarelto because of Afib. Patient is not sure where the blood is coming out. BP and HR elevated in triage.

## 2018-06-15 NOTE — ED PROVIDER NOTE - ATTENDING CONTRIBUTION TO CARE
76 y/o female with the above documented history and HPI who on exam appears very well and comfortable. VSs noted, oropharynx clear, neck supple, lungs CTA, cardiac sounds s/ audible m/r/g, abdomen soft, NT/ND, extremities s/ asymmetry, skin s/ rash and neurologically intact. There is nothing clinically evident to suggest any acute or emergent process. There is no clinical indication for any emergent diagnostic investigation or intervention. The patient has been discharged with appropriate instruction and follow-up accompanied by a family member.

## 2018-06-15 NOTE — ED PROVIDER NOTE - ENMT, MLM
Airway patent, Nasal mucosa clear. Mouth with normal mucosa. Throat has no vesicles, no oropharyngeal exudates and uvula is midline. Dried blood on tongue. No active bleed/mouth lac/tongue lac visualized. No blood at back of throat or in nares.

## 2018-06-15 NOTE — ED PROVIDER NOTE - CARDIAC, MLM
Normal rate, regular rhythm.  Heart sounds S1, S2.  No murmurs, rubs or gallops. 2+ distal pulses in extremities b/l

## 2018-06-15 NOTE — ED PROVIDER NOTE - MEDICAL DECISION MAKING DETAILS
77F hx afib on xarelto p/w mouth bleed. No active bleed at this time. VSS well appearing, asymptomatic. Will d/c with close f/u

## 2018-06-15 NOTE — ED PROVIDER NOTE - OBJECTIVE STATEMENT
77F hx afib on xarelto p/w mouth bleed. 77F hx afib on xarelto p/w mouth bleed. Patient states that she has had bleeding from her mouth since approximately 4 pm. Denies injury/tongue bite. Did not stop for hours and became concerned. States she went through half a bag of cotton balls with blood clots. Denies shortness of breath, dizziness, fatigue, dyspnea, chest pain, melena, hematuria, hematemesis, hemoptysis, no other complaints.

## 2018-06-15 NOTE — ED ADULT NURSE NOTE - OBJECTIVE STATEMENT
77 y.o female w. PMH of a-fib on Xarelto came into the ER w. c/o "bleeding from mouth" since 4 pm 04/14/18, pt states she is spitting up clots and cannot find the source of the bleed. Pt denies any CP, SOB n/v, fever, chills, h/a, dizziness, weakness, black or bloody stools. Pt is axox4, dry blood on tongue noted, no active bleeding noted at this time, lungs CTA, +bs abdomen soft non-distended, + central pulses felt b/l, safety and comfort maintained no acute distress noted at this time.

## 2018-06-20 DIAGNOSIS — R69 ILLNESS, UNSPECIFIED: ICD-10-CM

## 2018-07-01 ENCOUNTER — OUTPATIENT (OUTPATIENT)
Dept: OUTPATIENT SERVICES | Facility: HOSPITAL | Age: 78
LOS: 1 days | End: 2018-07-01

## 2018-07-03 DIAGNOSIS — Z71.89 OTHER SPECIFIED COUNSELING: ICD-10-CM

## 2018-08-08 ENCOUNTER — APPOINTMENT (OUTPATIENT)
Dept: CARDIOLOGY | Facility: CLINIC | Age: 78
End: 2018-08-08

## 2018-09-18 NOTE — PATIENT PROFILE ADULT. - TEACHING/LEARNING LEARNING PREFERENCES
IMPROVE VTE Individual Risk Assessment    RISK                                                                Points    [  ] Previous VTE                                                  3  [  ] Thrombophilia                                               2  [  ] Lower limb paralysis                                      2        (unable to hold up >15 seconds)    [  ] Current Cancer                                              2         (within 6 months)  [ x ] Immobilization > 24 hrs                                1  [  ] ICU/CCU stay > 24 hours                              1  [ x ] Age > 60                                                      1    IMPROVE VTE Score ____2_____  Will start on heaprin SC skill demonstration/verbal instruction c/w simvastatin 40mg. c/w pantoprazole 40mg Qd.

## 2018-10-19 ENCOUNTER — EMERGENCY (EMERGENCY)
Facility: HOSPITAL | Age: 78
LOS: 1 days | Discharge: HOME | End: 2018-10-19
Attending: EMERGENCY MEDICINE
Payer: MEDICARE

## 2018-10-19 VITALS
DIASTOLIC BLOOD PRESSURE: 80 MMHG | HEART RATE: 80 BPM | TEMPERATURE: 98 F | SYSTOLIC BLOOD PRESSURE: 126 MMHG | RESPIRATION RATE: 16 BRPM | OXYGEN SATURATION: 100 %

## 2018-10-19 VITALS
OXYGEN SATURATION: 100 % | RESPIRATION RATE: 17 BRPM | HEIGHT: 59 IN | WEIGHT: 175.05 LBS | TEMPERATURE: 98 F | HEART RATE: 85 BPM | DIASTOLIC BLOOD PRESSURE: 64 MMHG | SYSTOLIC BLOOD PRESSURE: 126 MMHG

## 2018-10-19 LAB
ALBUMIN SERPL ELPH-MCNC: 3.8 G/DL — SIGNIFICANT CHANGE UP (ref 3.3–5)
ALP SERPL-CCNC: 94 U/L — SIGNIFICANT CHANGE UP (ref 40–120)
ALT FLD-CCNC: 9 U/L — LOW (ref 10–45)
ANION GAP SERPL CALC-SCNC: 11 MMOL/L — SIGNIFICANT CHANGE UP (ref 5–17)
ANISOCYTOSIS BLD QL: SIGNIFICANT CHANGE UP
APPEARANCE UR: CLEAR — SIGNIFICANT CHANGE UP
AST SERPL-CCNC: 26 U/L — SIGNIFICANT CHANGE UP (ref 10–40)
BASE EXCESS BLDV CALC-SCNC: 3.5 MMOL/L — HIGH (ref -2–2)
BASOPHILS # BLD AUTO: 0 K/UL — SIGNIFICANT CHANGE UP (ref 0–0.2)
BASOPHILS NFR BLD AUTO: 0.7 % — SIGNIFICANT CHANGE UP (ref 0–2)
BILIRUB SERPL-MCNC: 0.5 MG/DL — SIGNIFICANT CHANGE UP (ref 0.2–1.2)
BILIRUB UR-MCNC: NEGATIVE — SIGNIFICANT CHANGE UP
BUN SERPL-MCNC: 19 MG/DL — SIGNIFICANT CHANGE UP (ref 7–23)
CA-I SERPL-SCNC: 1.24 MMOL/L — SIGNIFICANT CHANGE UP (ref 1.12–1.3)
CALCIUM SERPL-MCNC: 9.4 MG/DL — SIGNIFICANT CHANGE UP (ref 8.4–10.5)
CHLORIDE BLDV-SCNC: 108 MMOL/L — SIGNIFICANT CHANGE UP (ref 96–108)
CHLORIDE SERPL-SCNC: 107 MMOL/L — SIGNIFICANT CHANGE UP (ref 96–108)
CO2 BLDV-SCNC: 31 MMOL/L — HIGH (ref 22–30)
CO2 SERPL-SCNC: 27 MMOL/L — SIGNIFICANT CHANGE UP (ref 22–31)
COLOR SPEC: SIGNIFICANT CHANGE UP
CREAT SERPL-MCNC: 0.79 MG/DL — SIGNIFICANT CHANGE UP (ref 0.5–1.3)
DACRYOCYTES BLD QL SMEAR: SLIGHT — SIGNIFICANT CHANGE UP
DIFF PNL FLD: NEGATIVE — SIGNIFICANT CHANGE UP
ELLIPTOCYTES BLD QL SMEAR: SLIGHT — SIGNIFICANT CHANGE UP
EOSINOPHIL # BLD AUTO: 0.2 K/UL — SIGNIFICANT CHANGE UP (ref 0–0.5)
EOSINOPHIL NFR BLD AUTO: 3.9 % — SIGNIFICANT CHANGE UP (ref 0–6)
GAS PNL BLDV: 143 MMOL/L — SIGNIFICANT CHANGE UP (ref 136–145)
GAS PNL BLDV: SIGNIFICANT CHANGE UP
GIANT PLATELETS BLD QL SMEAR: PRESENT — SIGNIFICANT CHANGE UP
GLUCOSE BLDV-MCNC: 88 MG/DL — SIGNIFICANT CHANGE UP (ref 70–99)
GLUCOSE SERPL-MCNC: 95 MG/DL — SIGNIFICANT CHANGE UP (ref 70–99)
GLUCOSE UR QL: NEGATIVE — SIGNIFICANT CHANGE UP
HCO3 BLDV-SCNC: 29 MMOL/L — SIGNIFICANT CHANGE UP (ref 21–29)
HCT VFR BLD CALC: 27.7 % — LOW (ref 34.5–45)
HCT VFR BLDA CALC: 27 % — LOW (ref 39–50)
HGB BLD CALC-MCNC: 8.7 G/DL — LOW (ref 11.5–15.5)
HGB BLD-MCNC: 8.6 G/DL — LOW (ref 11.5–15.5)
HYPOCHROMIA BLD QL: SIGNIFICANT CHANGE UP
KETONES UR-MCNC: NEGATIVE — SIGNIFICANT CHANGE UP
LACTATE BLDV-MCNC: 1.3 MMOL/L — SIGNIFICANT CHANGE UP (ref 0.7–2)
LEUKOCYTE ESTERASE UR-ACNC: NEGATIVE — SIGNIFICANT CHANGE UP
LG PLATELETS BLD QL AUTO: SLIGHT — SIGNIFICANT CHANGE UP
LYMPHOCYTES # BLD AUTO: 1.3 K/UL — SIGNIFICANT CHANGE UP (ref 1–3.3)
LYMPHOCYTES # BLD AUTO: 21.1 % — SIGNIFICANT CHANGE UP (ref 13–44)
MACROCYTES BLD QL: SLIGHT — SIGNIFICANT CHANGE UP
MCHC RBC-ENTMCNC: 19.8 PG — LOW (ref 27–34)
MCHC RBC-ENTMCNC: 31.1 GM/DL — LOW (ref 32–36)
MCV RBC AUTO: 63.6 FL — LOW (ref 80–100)
MICROCYTES BLD QL: SIGNIFICANT CHANGE UP
MONOCYTES # BLD AUTO: 0.6 K/UL — SIGNIFICANT CHANGE UP (ref 0–0.9)
MONOCYTES NFR BLD AUTO: 9.3 % — SIGNIFICANT CHANGE UP (ref 2–14)
NEUTROPHILS # BLD AUTO: 4 K/UL — SIGNIFICANT CHANGE UP (ref 1.8–7.4)
NEUTROPHILS NFR BLD AUTO: 65 % — SIGNIFICANT CHANGE UP (ref 43–77)
NITRITE UR-MCNC: NEGATIVE — SIGNIFICANT CHANGE UP
OB PNL STL: NEGATIVE — SIGNIFICANT CHANGE UP
OVALOCYTES BLD QL SMEAR: SLIGHT — SIGNIFICANT CHANGE UP
PCO2 BLDV: 53 MMHG — HIGH (ref 35–50)
PH BLDV: 7.36 — SIGNIFICANT CHANGE UP (ref 7.35–7.45)
PH UR: 7 — SIGNIFICANT CHANGE UP (ref 5–8)
PLAT MORPH BLD: ABNORMAL
PLATELET # BLD AUTO: 365 K/UL — SIGNIFICANT CHANGE UP (ref 150–400)
PO2 BLDV: 21 MMHG — LOW (ref 25–45)
POIKILOCYTOSIS BLD QL AUTO: SIGNIFICANT CHANGE UP
POLYCHROMASIA BLD QL SMEAR: SLIGHT — SIGNIFICANT CHANGE UP
POTASSIUM BLDV-SCNC: 4.3 MMOL/L — SIGNIFICANT CHANGE UP (ref 3.5–5.3)
POTASSIUM SERPL-MCNC: 4.1 MMOL/L — SIGNIFICANT CHANGE UP (ref 3.5–5.3)
POTASSIUM SERPL-SCNC: 4.1 MMOL/L — SIGNIFICANT CHANGE UP (ref 3.5–5.3)
PROT SERPL-MCNC: 7.3 G/DL — SIGNIFICANT CHANGE UP (ref 6–8.3)
PROT UR-MCNC: SIGNIFICANT CHANGE UP
RBC # BLD: 4.34 M/UL — SIGNIFICANT CHANGE UP (ref 3.8–5.2)
RBC # FLD: 22.5 % — HIGH (ref 10.3–14.5)
RBC BLD AUTO: ABNORMAL
SAO2 % BLDV: 23 % — LOW (ref 67–88)
SCHISTOCYTES BLD QL AUTO: SLIGHT — SIGNIFICANT CHANGE UP
SODIUM SERPL-SCNC: 145 MMOL/L — SIGNIFICANT CHANGE UP (ref 135–145)
SP GR SPEC: 1.02 — SIGNIFICANT CHANGE UP (ref 1.01–1.02)
TARGETS BLD QL SMEAR: SIGNIFICANT CHANGE UP
UROBILINOGEN FLD QL: ABNORMAL
WBC # BLD: 6.2 K/UL — SIGNIFICANT CHANGE UP (ref 3.8–10.5)
WBC # FLD AUTO: 6.2 K/UL — SIGNIFICANT CHANGE UP (ref 3.8–10.5)

## 2018-10-19 PROCEDURE — 80053 COMPREHEN METABOLIC PANEL: CPT

## 2018-10-19 PROCEDURE — 84132 ASSAY OF SERUM POTASSIUM: CPT

## 2018-10-19 PROCEDURE — 87086 URINE CULTURE/COLONY COUNT: CPT

## 2018-10-19 PROCEDURE — 82272 OCCULT BLD FECES 1-3 TESTS: CPT

## 2018-10-19 PROCEDURE — 85014 HEMATOCRIT: CPT

## 2018-10-19 PROCEDURE — 74176 CT ABD & PELVIS W/O CONTRAST: CPT | Mod: 26

## 2018-10-19 PROCEDURE — 99284 EMERGENCY DEPT VISIT MOD MDM: CPT | Mod: GC

## 2018-10-19 PROCEDURE — 99284 EMERGENCY DEPT VISIT MOD MDM: CPT | Mod: 25

## 2018-10-19 PROCEDURE — 85027 COMPLETE CBC AUTOMATED: CPT

## 2018-10-19 PROCEDURE — 82947 ASSAY GLUCOSE BLOOD QUANT: CPT

## 2018-10-19 PROCEDURE — 82330 ASSAY OF CALCIUM: CPT

## 2018-10-19 PROCEDURE — 82435 ASSAY OF BLOOD CHLORIDE: CPT

## 2018-10-19 PROCEDURE — 83605 ASSAY OF LACTIC ACID: CPT

## 2018-10-19 PROCEDURE — 82803 BLOOD GASES ANY COMBINATION: CPT

## 2018-10-19 PROCEDURE — 81003 URINALYSIS AUTO W/O SCOPE: CPT

## 2018-10-19 PROCEDURE — 74176 CT ABD & PELVIS W/O CONTRAST: CPT

## 2018-10-19 PROCEDURE — 84295 ASSAY OF SERUM SODIUM: CPT

## 2018-10-19 RX ORDER — SODIUM CHLORIDE 9 MG/ML
500 INJECTION INTRAMUSCULAR; INTRAVENOUS; SUBCUTANEOUS ONCE
Qty: 0 | Refills: 0 | Status: COMPLETED | OUTPATIENT
Start: 2018-10-19 | End: 2018-10-19

## 2018-10-19 RX ADMIN — SODIUM CHLORIDE 500 MILLILITER(S): 9 INJECTION INTRAMUSCULAR; INTRAVENOUS; SUBCUTANEOUS at 19:09

## 2018-10-19 NOTE — ED PROVIDER NOTE - PROGRESS NOTE DETAILS
updated patient and daughter on results. patient reports symptoms improved. will discharge with pcp and urology follow-up. - resident Steven Kim

## 2018-10-19 NOTE — ED PROVIDER NOTE - MEDICAL DECISION MAKING DETAILS
78 year old female with a history of HTN, afib, CHF on xarelto presents with hematuria and LLQ pain R/O UTI, kidneys stones, will obtain blood work, CT scan and reevaluate. ZR

## 2018-10-19 NOTE — ED PROVIDER NOTE - NSFOLLOWUPINSTRUCTIONS_ED_ALL_ED_FT
Please follow-up with your primary care doctor in the next 24-48 hours   Please follow-up with a urologist in the next 1-2 weeks for the blood in the urine   If you have any worsening symptoms, abdominal pain or are unable to tolerate food or liquids please return to the emergency department

## 2018-10-19 NOTE — ED ADULT NURSE NOTE - OBJECTIVE STATEMENT
79 y/o female presenting to the ED via walking in complaining of one episode of hematuria with intermittent LLQ pain x this morning. Hx of HTN, CHF, Afib on Xarelto. Pain is 4/10 and radiating to left lower back. Patient denies pain on assessment. Patient states urinated and saw episode of hematuria which had resolved with subsequent urinations. Patient denies fevers, chills, dizziness, n/v/d, numbness, tingling. Denies weakness. A&OX3. Safety and comfort measures provided.

## 2018-10-19 NOTE — ED ADULT NURSE NOTE - NSIMPLEMENTINTERV_GEN_ALL_ED
Implemented All Fall Risk Interventions:  [place] to call system. Call bell, personal items and telephone within reach. Instruct patient to call for assistance. Room bathroom lighting operational. Non-slip footwear when patient is off stretcher. Physically safe environment: no spills, clutter or unnecessary equipment. Stretcher in lowest position, wheels locked, appropriate side rails in place. Provide visual cue, wrist band, yellow gown, etc. Monitor gait and stability. Monitor for mental status changes and reorient to person, place, and time. Review medications for side effects contributing to fall risk. Reinforce activity limits and safety measures with patient and family.

## 2018-10-20 LAB
CULTURE RESULTS: NO GROWTH — SIGNIFICANT CHANGE UP
SPECIMEN SOURCE: SIGNIFICANT CHANGE UP

## 2019-09-10 NOTE — ED PROVIDER NOTE - PMH
9/10/2019    Chief Complaint   Patient presents with   • Medicare Wellness Visit     subsequent, discuss labs     Gracie Green presents for Subsequent Annual Medicare Wellness Visit. She has no current complaints or concerns.  Patient is compliant with medication for hypertension.  Blood pressure shows good control.  She denies any symptoms associated with hypothyroidism.  She is not current medications.    Hypertension:     Patient is compliant with medication. Patient denies symptoms.    Lab Results   Component Value Date    URMIC 0.52 08/29/2019    UCR 82.50 08/29/2019    MALBCR 6.3 08/29/2019       Lab Results   Component Value Date    SODIUM 141 08/29/2019    POTASSIUM 4.0 08/29/2019    CHLORIDE 104 08/29/2019    CO2 26 08/29/2019    GLUCOSE 92 08/29/2019    BUN 12 08/29/2019    CREATININE 0.87 08/29/2019    GFRA 80 08/29/2019    GFRNA 69 08/29/2019    BCRAT 14 08/29/2019    AST 24 08/29/2019    GLOB 3.3 08/29/2019    AGR 1.2 08/29/2019    GPT 24 08/29/2019       Lipid panel:      Lab Results   Component Value Date    CHOLESTEROL 185 08/29/2019    HDL 52 08/29/2019    CALCLDL 109 08/29/2019    TRIGLYCERIDE 118 08/29/2019         Patient Care Team:  Hector Nation MD as PCP - General (Family Practice)    There are no active problems to display for this patient.      Current Outpatient Medications   Medication Sig   • lisinopril-hydroCHLOROthiazide (PRINZIDE,ZESTORETIC) 10-12.5 MG per tablet Take 1 tablet by mouth daily.   • Calcium Carbonate-Vitamin D (CALCIUM 500/D PO)    • Cholecalciferol (D3-1000 PO)    • melatonin 5 MG Take by mouth nightly.     No current facility-administered medications for this visit.        ALLERGIES:   Allergen Reactions   • Nut - Multiple   (Food) Nausea & Vomiting     Pine nuts       Past Medical History:   Diagnosis Date   • Anemia    • Elevated blood pressure reading 08/2010    (not hypertension)   • Essential hypertension    • Excessive menses 12/2010   • History of colonic  polyps     tubular  adenoma   • Hypothyroidism (acquired) 12/2010    resolved per [facility]   • Nausea with vomiting        Past Surgical History:   Procedure Laterality Date   • Colonoscopy  09/2007   • Colonoscopy  10/16/2017   • Colonoscopy w/ polypectomy  10/09/2012   • Dilation and curettage of uterus         Family History   Problem Relation Age of Onset   • Cancer, Colon Mother    • Heart disease Father    • COPD Father    • Hypertension Father    • Cancer, Colon Maternal Uncle    • Heart Maternal Uncle         heart attack       Social History     Socioeconomic History   • Marital status: /Civil Union     Spouse name: Not on file   • Number of children: Not on file   • Years of education: Not on file   • Highest education level: Not on file   Occupational History   • Not on file   Social Needs   • Financial resource strain: Not on file   • Food insecurity:     Worry: Not on file     Inability: Not on file   • Transportation needs:     Medical: Not on file     Non-medical: Not on file   Tobacco Use   • Smoking status: Former Smoker   • Smokeless tobacco: Never Used   Substance and Sexual Activity   • Alcohol use: No   • Drug use: No   • Sexual activity: Not on file   Lifestyle   • Physical activity:     Days per week: Not on file     Minutes per session: Not on file   • Stress: Not on file   Relationships   • Social connections:     Talks on phone: Not on file     Gets together: Not on file     Attends Jainism service: Not on file     Active member of club or organization: Not on file     Attends meetings of clubs or organizations: Not on file     Relationship status: Not on file   • Intimate partner violence:     Fear of current or ex partner: Not on file     Emotionally abused: Not on file     Physically abused: Not on file     Forced sexual activity: Not on file   Other Topics Concern   • Not on file   Social History Narrative   • Not on file         Alcohol Use: No                   Drug Use:    No                Visit Vitals  /68 (BP Location: [facility], Patient Position: Sitting, Cuff Size: Regular)   Pulse 65   Temp 97.8 °F (36.6 °C) (Tympanic)   Ht 5' 4\" (1.626 m)   Wt 76.1 kg   SpO2 97%   BMI 28.79 kg/m²         Diet:  Reviewed with the patient and reveals: none.    Physical activities:  sporadic irregular exercise.    Patient Questionnaire:  Patient's Medicare questionnaire was reviewed and discussed during the visit.  She is currently at baseline.  Stable.  She denies any SI/HI manic episodes, or visual/auditory hallucinations.  She has some stress with the fact that her  has Parkinson's and her mom has dementia.  She is otherwise well without concerns.  No question data found.     Vision and hearing screens documented:    No exam data present  Advance Directive: has NO advance directive - Patient declines additional information.  Cognitive Assessment: no evidence of cognitive dysfunction by direct observation    Needed Screening/Treatment:   none  Needed follow up:  None      Gracie was seen today for medicare wellness visit and imm/inj.    Diagnoses and all orders for this visit:    Medicare annual wellness visit, initial    Essential hypertension  -     COMPREHENSIVE METABOLIC PANEL; Future  -     MICROALBUMIN URINE RANDOM; Future    History of hypothyroidism  -     THYROID STIMULATING HORMONE; Future    Need for vaccination  -     INFLUENZA ENHANCED HIGH DOSE SPLIT PRES FREE VACC, IM (FLUZONE-HIGH DOSE)  -     PNEUMOCOCCAL POLYSACCHARIDE ADULT VACC, IM/SQ (PNEUMOVAX 23)    Screening for cardiovascular condition  -     LIPID PANEL WITH REFLEX; Future        Bronchitis    Cyst, ovarian    HTN (hypertension)    Obesity (BMI 30-39.9)

## 2019-10-14 ENCOUNTER — APPOINTMENT (OUTPATIENT)
Dept: OBGYN | Facility: CLINIC | Age: 79
End: 2019-10-14
Payer: MEDICARE

## 2019-10-14 ENCOUNTER — ASOB RESULT (OUTPATIENT)
Age: 79
End: 2019-10-14

## 2019-10-14 VITALS
SYSTOLIC BLOOD PRESSURE: 138 MMHG | DIASTOLIC BLOOD PRESSURE: 82 MMHG | WEIGHT: 197 LBS | HEART RATE: 116 BPM | BODY MASS INDEX: 39.72 KG/M2 | HEIGHT: 59 IN

## 2019-10-14 DIAGNOSIS — R60.0 LOCALIZED EDEMA: ICD-10-CM

## 2019-10-14 DIAGNOSIS — R22.2 LOCALIZED SWELLING, MASS AND LUMP, TRUNK: ICD-10-CM

## 2019-10-14 DIAGNOSIS — Z01.419 ENCOUNTER FOR GYNECOLOGICAL EXAMINATION (GENERAL) (ROUTINE) W/OUT ABNORMAL FINDINGS: ICD-10-CM

## 2019-10-14 DIAGNOSIS — N95.2 POSTMENOPAUSAL ATROPHIC VAGINITIS: ICD-10-CM

## 2019-10-14 PROCEDURE — 99203 OFFICE O/P NEW LOW 30 MIN: CPT

## 2019-10-14 PROCEDURE — 76857 US EXAM PELVIC LIMITED: CPT

## 2019-10-14 NOTE — PHYSICAL EXAM
[Awake] : awake [Alert] : alert [Soft] : soft [Oriented x3] : oriented to person, place, and time [Vulvar Atrophy] : vulvar atrophy [Atrophy] : atrophy [No Bleeding] : there was no active vaginal bleeding [Absent] : absent [Adnexa Absent] : absent bilaterally [Normal] : external genitalia [Mass] : no breast mass [Acute Distress] : no acute distress [Axillary LAD] : no axillary lymphadenopathy [Nipple Discharge] : no nipple discharge [Tender] : non tender

## 2019-10-14 NOTE — DISCUSSION/SUMMARY
[FreeTextEntry1] : TAS shows extensive SQ edema of abdominal wall. PAtient very relieved. Patient will return to PCP.

## 2019-12-09 NOTE — PATIENT PROFILE ADULT. - FUNCTIONAL SCREEN CURRENT LEVEL: EATING, MLM
Peripheral nerve/Neuraxial procedure note : Sciatic  Pre-Procedure  Performed by Holger Jesus MD  Location: pre-op      Pre-Anesthestic Checklist: patient identified, IV checked, site marked, risks and benefits discussed, informed consent, monitors and equipment checked, at physician/surgeon's request and post-op pain management    Timeout  Correct Patient: Yes   Correct Procedure: Yes   Correct Site: Yes   Correct Laterality: Yes   Correct Position: Yes   Site Marked: Yes   .   Procedure Documentation    .    Procedure: Sciatic, left.   Patient Position:supine Local skin infiltrated with mL of 1% lidocaine.    Ultrasound used to identify targeted nerve, plexus, or vascular marker and placed a needle adjacent to it., Ultrasound was used to visualize the spread of the anesthetic in close proximity to the above stated nerve. A permanent image is entered into the patient's record.  Patient Prep/Sterile Barriers; mask, sterile gloves, chlorhexidine gluconate and isopropyl alcohol.  .  Needle: insulated, short bevel (Arrow)   Needle Gauge: 21.  Needle Length (millimeters) 90  Insertion Method: Single Shot.        Assessment/Narrative  Paresthesias: No.  .  The placement was negative for: blood aspirated, painful injection and site bleeding.  Bolus given via needle..   Secured via.   Complications: none. Comments:  Postoperative pain block requested by surgeon for severe postoperative pain.  Patient brought to Preop for procedure.      25 cc of 0.5% Bupivacaine with epinephrine (1:400K) injected in close proximity to the sciatic nerve.  Pt tolerated well.    No complications.      The surgeon has given a verbal order transferring care of this patient to me for the performance of a regional analgesia block for post-op pain control. It is requested of me because I am uniquely trained and qualified to perform this block and the surgeon is neither trained nor qualified to perform this procedure.    Ultrasound  Interpretation, peripheral nerve block    1.  Under ultrasound guidance, a 20 gauge needle was inserted and placed in close proximity to the sciatic nerve.  2. Ultrasound was also used to visualize the spread of the anesthetic in close proximity to the nerve being blocked.  3. The nerve appeared anatomically normal.  4. There were no apparent abnormal pathological findings.  5. A permanent ultrasound image was saved n the patient's record.    ROSALEE LOVE MD   8:25 AM             (0) independent

## 2019-12-13 NOTE — PHYSICAL THERAPY INITIAL EVALUATION ADULT - SOCIAL CONCERNS
Oxybutynin Counseling:  I discussed with the patient the risks of oxybutynin including but not limited to skin rash, drowsiness, dry mouth, difficulty urinating, and blurred vision. None

## 2020-07-22 NOTE — DISCHARGE NOTE ADULT - ABILITY TO HEAR (WITH HEARING AID OR HEARING APPLIANCE IF NORMALLY USED):
Patient: Anne Marie Dominguez MRN: [id number]  SSN: [id number]   YOB: 2000  Age: 23 y.o. Sex: female     Patient is status post Procedure(s):  LAPAROSCOPIC LEFT OVARIAN CYSTECTOMY. Surgeon(s) and Role:     * Venita Gamino MD - Primary     * Oumou Rowell MD - Assisting    Local/Dose/Irrigation:  0.9% normal saline used for irrigation                Peripheral IV 07/22/20 Right Antecubital (Active)            Airway - Endotracheal Tube 07/22/20 Oral (Active)                   Dressing/Packing:  Wound Abdomen 3 trocar sites-Dressing Type: Adhesive wound dressing (Band-Aid); Sutures (07/22/20 1258)  Wound Vagina-Dressing Type: Sandra-pad (07/22/20 1258)    Splint/Cast:  ]    Other:  SCDs, barbour discontinued at end of procedure
Adequate: hears normal conversation without difficulty

## 2021-01-11 ENCOUNTER — RESULT REVIEW (OUTPATIENT)
Age: 81
End: 2021-01-11

## 2021-03-07 ENCOUNTER — APPOINTMENT (OUTPATIENT)
Dept: DISASTER EMERGENCY | Facility: CLINIC | Age: 81
End: 2021-03-07

## 2021-03-07 DIAGNOSIS — Z01.818 ENCOUNTER FOR OTHER PREPROCEDURAL EXAMINATION: ICD-10-CM

## 2021-03-08 LAB — SARS-COV-2 N GENE NPH QL NAA+PROBE: NOT DETECTED

## 2021-03-09 ENCOUNTER — APPOINTMENT (OUTPATIENT)
Dept: PULMONOLOGY | Facility: CLINIC | Age: 81
End: 2021-03-09
Payer: MEDICARE

## 2021-03-09 ENCOUNTER — LABORATORY RESULT (OUTPATIENT)
Age: 81
End: 2021-03-09

## 2021-03-09 VITALS
OXYGEN SATURATION: 94 % | SYSTOLIC BLOOD PRESSURE: 115 MMHG | TEMPERATURE: 97.8 F | DIASTOLIC BLOOD PRESSURE: 73 MMHG | HEART RATE: 97 BPM

## 2021-03-09 PROCEDURE — 94729 DIFFUSING CAPACITY: CPT

## 2021-03-09 PROCEDURE — 88738 HGB QUANT TRANSCUTANEOUS: CPT

## 2021-03-09 PROCEDURE — 99204 OFFICE O/P NEW MOD 45 MIN: CPT | Mod: 25

## 2021-03-09 PROCEDURE — 94727 GAS DIL/WSHOT DETER LNG VOL: CPT

## 2021-03-09 PROCEDURE — 36415 COLL VENOUS BLD VENIPUNCTURE: CPT

## 2021-03-09 PROCEDURE — 94010 BREATHING CAPACITY TEST: CPT

## 2021-03-09 RX ORDER — IRON/IRON ASP GLY/FA/MV-MIN 38 125-25-1MG
TABLET ORAL
Refills: 0 | Status: ACTIVE | COMMUNITY

## 2021-03-09 RX ORDER — POTASSIUM CHLORIDE 600 MG/1
TABLET, FILM COATED, EXTENDED RELEASE ORAL
Refills: 0 | Status: DISCONTINUED | COMMUNITY
End: 2021-03-09

## 2021-03-09 RX ORDER — RIVAROXABAN 20 MG/1
20 TABLET, FILM COATED ORAL
Qty: 90 | Refills: 3 | Status: DISCONTINUED | COMMUNITY
Start: 2017-11-09 | End: 2021-03-09

## 2021-03-09 RX ORDER — FUROSEMIDE 20 MG/1
20 TABLET ORAL DAILY
Qty: 30 | Refills: 1 | Status: DISCONTINUED | COMMUNITY
End: 2021-03-09

## 2021-03-09 RX ORDER — RIVAROXABAN 15 MG/1
15 TABLET, FILM COATED ORAL
Refills: 0 | Status: ACTIVE | COMMUNITY

## 2021-03-09 RX ORDER — FUROSEMIDE 40 MG/1
40 TABLET ORAL
Refills: 0 | Status: ACTIVE | COMMUNITY

## 2021-03-09 RX ORDER — METOPROLOL SUCCINATE 25 MG/1
25 TABLET, EXTENDED RELEASE ORAL
Qty: 30 | Refills: 3 | Status: DISCONTINUED | COMMUNITY
Start: 2017-11-09 | End: 2021-03-09

## 2021-03-09 RX ORDER — METOPROLOL TARTRATE 50 MG/1
50 TABLET, FILM COATED ORAL
Refills: 0 | Status: ACTIVE | COMMUNITY

## 2021-03-09 RX ORDER — FUROSEMIDE 20 MG/1
20 TABLET ORAL
Refills: 0 | Status: ACTIVE | COMMUNITY

## 2021-03-09 NOTE — HISTORY OF PRESENT ILLNESS
[Never] : never [TextBox_4] : 80F\par \par here for pulm htn eval\par \par told had abnormal ct chest--severe pulm htn, small nodules\par also has very elevated igg, supposed to see heme\par \par was told once she may have lupus, denies skin rash/joint pains etc.\par \par severely swollen lower extremities, can barely walk\par gets sob exerting herself\par sleeps sitting up in chair\par \par no toxic exposures\par not smoker, has second hand smoke exposure.

## 2021-03-09 NOTE — CONSULT LETTER
[FreeTextEntry1] : Dear Dr.Shoshanah Owens,\par \par I had the pleasure of evaluating your patient, MITZY ARELLANO today in pulmonary consultation.  Please refer to my attached note for my findings and recommendations.\par \par \par Thank you for allowing me to participate in the care of your patient, please feel free to call with any questions or concerns.\par \par \par Sincerely,\par \par Miriam Shin MD\par [facility] \par [facility]\par \par

## 2021-03-09 NOTE — ASSESSMENT
[FreeTextEntry1] : check rheum panel\par check sleep study r/o GRECIA\par trial of bronchodilator--samples of anoro given\par \par was told needs to see heme, question of amyloid?? heme referral given.\par \par

## 2021-03-09 NOTE — PHYSICAL EXAM
[No Acute Distress] : no acute distress [No Resp Distress] : no resp distress [TextBox_68] : basilar crackles

## 2021-03-09 NOTE — PROCEDURE
[FreeTextEntry1] : PFT: moderate obstructive dysfunction.  Lung volumes normal. DLCO moderately reduced.\par \par \par Reviewed:\par CT chest [facility] 2/21/21: enlarged right chambers consistent with pulm htn, 3mm nodule RUL

## 2021-03-10 ENCOUNTER — APPOINTMENT (OUTPATIENT)
Dept: CV DIAGNOSTICS | Facility: HOSPITAL | Age: 81
End: 2021-03-10

## 2021-03-10 ENCOUNTER — OUTPATIENT (OUTPATIENT)
Dept: OUTPATIENT SERVICES | Facility: HOSPITAL | Age: 81
LOS: 1 days | End: 2021-03-10
Payer: MEDICARE

## 2021-03-10 DIAGNOSIS — I25.10 ATHEROSCLEROTIC HEART DISEASE OF NATIVE CORONARY ARTERY WITHOUT ANGINA PECTORIS: ICD-10-CM

## 2021-03-10 LAB
CK SERPL-CCNC: 135 U/L
CRP SERPL-MCNC: 45 MG/L
ERYTHROCYTE [SEDIMENTATION RATE] IN BLOOD BY WESTERGREN METHOD: 36 MM/HR
RHEUMATOID FACT SER QL: 12 IU/ML

## 2021-03-10 PROCEDURE — 93018 CV STRESS TEST I&R ONLY: CPT

## 2021-03-10 PROCEDURE — A9505: CPT

## 2021-03-10 PROCEDURE — A9500: CPT

## 2021-03-10 PROCEDURE — 93016 CV STRESS TEST SUPVJ ONLY: CPT

## 2021-03-10 PROCEDURE — 78452 HT MUSCLE IMAGE SPECT MULT: CPT

## 2021-03-10 PROCEDURE — 93017 CV STRESS TEST TRACING ONLY: CPT

## 2021-03-10 PROCEDURE — 78452 HT MUSCLE IMAGE SPECT MULT: CPT | Mod: 26,MH

## 2021-03-11 LAB
ANA PAT FLD IF-IMP: ABNORMAL
ANA PAT FLD IF-IMP: ABNORMAL
ANA SER IF-ACNC: ABNORMAL
ANACR T: ABNORMAL

## 2021-03-12 LAB — MPO AB + PR3 PNL SER: NORMAL

## 2021-03-30 ENCOUNTER — APPOINTMENT (OUTPATIENT)
Dept: PULMONOLOGY | Facility: CLINIC | Age: 81
End: 2021-03-30
Payer: MEDICARE

## 2021-03-30 VITALS
TEMPERATURE: 97.8 F | HEART RATE: 73 BPM | DIASTOLIC BLOOD PRESSURE: 79 MMHG | RESPIRATION RATE: 15 BRPM | OXYGEN SATURATION: 96 % | SYSTOLIC BLOOD PRESSURE: 124 MMHG

## 2021-03-30 PROCEDURE — 99214 OFFICE O/P EST MOD 30 MIN: CPT

## 2021-03-30 NOTE — CONSULT LETTER
[FreeTextEntry1] : Dear Dr.Shoshanah Owens,\par \par I had the pleasure of evaluating your patient, MITZY ARELLANO today in pulmonary consultation.  Please refer to my attached note for my findings and recommendations.\par \par \par Thank you for allowing me to participate in the care of your patient, please feel free to call with any questions or concerns.\par \par \par Sincerely,\par \par Miriam Shin MD\par [facility] \par [facility]\par \par

## 2021-03-30 NOTE — HISTORY OF PRESENT ILLNESS
[Never] : never [TextBox_4] : doing ok\par thinks anoro helped a little\par rheum labs only positive WIN\par hasn’t seen heme yet, has appt in 2 weeks\par \par Prior hx:\par 80F\par \par here for pulm htn eval\par \par told had abnormal ct chest--severe pulm htn, small nodules\par also has very elevated igg, supposed to see heme\par \par was told once she may have lupus, denies skin rash/joint pains etc.\par \par severely swollen lower extremities, can barely walk\par gets sob exerting herself\par sleeps sitting up in chair\par \par no toxic exposures\par not smoker, has second hand smoke exposure.

## 2021-03-30 NOTE — ASSESSMENT
[FreeTextEntry1] : Rheum eval\par check sleep study r/o GRECIA\par cont anoro for now\par fu heme eval\par \par

## 2021-03-30 NOTE — PROCEDURE
[FreeTextEntry1] : \par prior exams reviewed:\par \par Rheum panel--positive WIN\par \par PFT: moderate obstructive dysfunction.  Lung volumes normal. DLCO moderately reduced.\par \par \par Reviewed:\par CT chest [facility] 2/21/21: enlarged right chambers consistent with pulm htn, 3mm nodule RUL

## 2021-04-05 ENCOUNTER — FORM ENCOUNTER (OUTPATIENT)
Age: 81
End: 2021-04-05

## 2021-04-05 ENCOUNTER — APPOINTMENT (OUTPATIENT)
Dept: PULMONOLOGY | Facility: CLINIC | Age: 81
End: 2021-04-05
Payer: MEDICARE

## 2021-04-05 PROCEDURE — 95800 SLP STDY UNATTENDED: CPT

## 2021-04-13 ENCOUNTER — OUTPATIENT (OUTPATIENT)
Dept: OUTPATIENT SERVICES | Facility: HOSPITAL | Age: 81
LOS: 1 days | Discharge: HOME | End: 2021-04-13

## 2021-04-13 DIAGNOSIS — D89.0 POLYCLONAL HYPERGAMMAGLOBULINEMIA: ICD-10-CM

## 2021-04-14 ENCOUNTER — APPOINTMENT (OUTPATIENT)
Dept: HEMATOLOGY ONCOLOGY | Facility: CLINIC | Age: 81
End: 2021-04-14
Payer: MEDICARE

## 2021-04-14 ENCOUNTER — RESULT REVIEW (OUTPATIENT)
Age: 81
End: 2021-04-14

## 2021-04-14 VITALS
RESPIRATION RATE: 15 BRPM | SYSTOLIC BLOOD PRESSURE: 114 MMHG | HEART RATE: 71 BPM | WEIGHT: 200 LBS | DIASTOLIC BLOOD PRESSURE: 69 MMHG | HEIGHT: 59 IN | TEMPERATURE: 97.2 F | OXYGEN SATURATION: 99 % | BODY MASS INDEX: 40.32 KG/M2

## 2021-04-14 DIAGNOSIS — I51.7 CARDIOMEGALY: ICD-10-CM

## 2021-04-14 LAB
BASOPHILS # BLD AUTO: 0.04 K/UL — SIGNIFICANT CHANGE UP (ref 0–0.2)
BASOPHILS NFR BLD AUTO: 0.7 % — SIGNIFICANT CHANGE UP (ref 0–2)
EOSINOPHIL # BLD AUTO: 0.2 K/UL — SIGNIFICANT CHANGE UP (ref 0–0.5)
EOSINOPHIL NFR BLD AUTO: 3.7 % — SIGNIFICANT CHANGE UP (ref 0–6)
HCT VFR BLD CALC: 33.4 % — LOW (ref 34.5–45)
HGB BLD-MCNC: 11.8 G/DL — SIGNIFICANT CHANGE UP (ref 11.5–15.5)
IMM GRANULOCYTES NFR BLD AUTO: 1.1 % — SIGNIFICANT CHANGE UP (ref 0–1.5)
LYMPHOCYTES # BLD AUTO: 1.02 K/UL — SIGNIFICANT CHANGE UP (ref 1–3.3)
LYMPHOCYTES # BLD AUTO: 19.1 % — SIGNIFICANT CHANGE UP (ref 13–44)
MCHC RBC-ENTMCNC: 27 PG — SIGNIFICANT CHANGE UP (ref 27–34)
MCHC RBC-ENTMCNC: 35.3 G/DL — SIGNIFICANT CHANGE UP (ref 32–36)
MCV RBC AUTO: 76.4 FL — LOW (ref 80–100)
MONOCYTES # BLD AUTO: 0.52 K/UL — SIGNIFICANT CHANGE UP (ref 0–0.9)
MONOCYTES NFR BLD AUTO: 9.7 % — SIGNIFICANT CHANGE UP (ref 2–14)
NEUTROPHILS # BLD AUTO: 3.51 K/UL — SIGNIFICANT CHANGE UP (ref 1.8–7.4)
NEUTROPHILS NFR BLD AUTO: 65.7 % — SIGNIFICANT CHANGE UP (ref 43–77)
NRBC # BLD: 0 /100 WBCS — SIGNIFICANT CHANGE UP (ref 0–0)
PLATELET # BLD AUTO: 254 K/UL — SIGNIFICANT CHANGE UP (ref 150–400)
RBC # BLD: 4.37 M/UL — SIGNIFICANT CHANGE UP (ref 3.8–5.2)
RBC # FLD: 17.2 % — HIGH (ref 10.3–14.5)
WBC # BLD: 5.35 K/UL — SIGNIFICANT CHANGE UP (ref 3.8–10.5)
WBC # FLD AUTO: 5.35 K/UL — SIGNIFICANT CHANGE UP (ref 3.8–10.5)

## 2021-04-14 PROCEDURE — 99204 OFFICE O/P NEW MOD 45 MIN: CPT

## 2021-04-14 PROCEDURE — 99072 ADDL SUPL MATRL&STAF TM PHE: CPT

## 2021-04-14 NOTE — HISTORY OF PRESENT ILLNESS
[de-identified] : 81yo F w/ afib on Xarelto, HTN, lymphedema referred for gammopathy, r/o amyloidosis. \par \par She is seeing Dr. Jamie Owens cardiology. Pt notes having dyspnea with minimal exertion. She has severe edema of LE from lymphedema for which she is on lasix 60mg BID. She had echo 1/27/21: normal LV function and wall motion. Moderate LAE, mild RV enlargement, severe RA enlargement, mild-moderate MR and moderate to severe TR. PASP approx 52 c/w moderate pulmonary HTN\par She had a CT chest 2/12/21 which showed cardiomegaly, mostly affecting the right chambers consistent with pulmonary HTN. 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 She is here for evaluation for amyloid. She is awaiting technetium pyrophosphate scan. \par \par She is getting her second COVID vaccine dose on Sat.

## 2021-04-27 ENCOUNTER — APPOINTMENT (OUTPATIENT)
Dept: PULMONOLOGY | Facility: CLINIC | Age: 81
End: 2021-04-27
Payer: MEDICARE

## 2021-04-27 VITALS
TEMPERATURE: 98.2 F | HEART RATE: 73 BPM | OXYGEN SATURATION: 93 % | DIASTOLIC BLOOD PRESSURE: 63 MMHG | SYSTOLIC BLOOD PRESSURE: 115 MMHG

## 2021-04-27 PROCEDURE — 99214 OFFICE O/P EST MOD 30 MIN: CPT

## 2021-04-27 PROCEDURE — 99072 ADDL SUPL MATRL&STAF TM PHE: CPT

## 2021-04-27 NOTE — ASSESSMENT
[FreeTextEntry1] : cont anoro for now for moderate obstruction on PFT, she feels this has helped a little.\par \par no evidence GRECIA\par \par follow up fat pad biopsy/heme\par \par cont fu with cardiology\par \par repeat ct chest 1 year for small nodule.

## 2021-04-27 NOTE — HISTORY OF PRESENT ILLNESS
[Never] : never [TextBox_4] : here to review sleep study\par ahi 4, no sleep disordered breathing identified\par on anoro which may have helped her breathing a little\par being worked up for amyloid

## 2021-04-27 NOTE — PROCEDURE
[FreeTextEntry1] : HST: ahi 4, no evidence GRECIA\par \par \par prior exams reviewed:\par \par Rheum panel--positive WIN\par \par PFT: moderate obstructive dysfunction.  Lung volumes normal. DLCO moderately reduced.\par \par \par Reviewed:\par CT chest [facility] 2/21/21: enlarged right chambers consistent with pulm htn, 3mm nodule RUL

## 2021-04-27 NOTE — CONSULT LETTER
[FreeTextEntry1] : Dear Dr.Shoshanah Owens ,\par \par I had the pleasure of evaluating your patient, MITZY ARELLANO today in pulmonary consultation.  Please refer to my attached note for my findings and recommendations.\par \par \par Thank you for allowing me to participate in the care of your patient, please feel free to call with any questions or concerns.\par \par \par Sincerely,\par \par Miriam Shin MD\par [facility] \par [facility]\par \par

## 2021-07-13 ENCOUNTER — NON-APPOINTMENT (OUTPATIENT)
Age: 81
End: 2021-07-13

## 2021-07-21 ENCOUNTER — RX RENEWAL (OUTPATIENT)
Age: 81
End: 2021-07-21

## 2021-07-28 ENCOUNTER — OUTPATIENT (OUTPATIENT)
Dept: OUTPATIENT SERVICES | Facility: HOSPITAL | Age: 81
LOS: 1 days | End: 2021-07-28
Payer: MEDICARE

## 2021-07-28 ENCOUNTER — APPOINTMENT (OUTPATIENT)
Dept: NUCLEAR MEDICINE | Facility: IMAGING CENTER | Age: 81
End: 2021-07-28
Payer: MEDICARE

## 2021-07-28 DIAGNOSIS — Z00.8 ENCOUNTER FOR OTHER GENERAL EXAMINATION: ICD-10-CM

## 2021-07-28 PROCEDURE — 78800 RP LOCLZJ TUM 1 AREA 1 D IMG: CPT

## 2021-07-28 PROCEDURE — 78830 RP LOCLZJ TUM SPECT W/CT 1: CPT | Mod: 26

## 2021-07-28 PROCEDURE — 78830 RP LOCLZJ TUM SPECT W/CT 1: CPT

## 2021-07-28 PROCEDURE — A9538: CPT

## 2021-09-19 ENCOUNTER — APPOINTMENT (OUTPATIENT)
Dept: DISASTER EMERGENCY | Facility: CLINIC | Age: 81
End: 2021-09-19

## 2021-09-19 DIAGNOSIS — Z01.818 ENCOUNTER FOR OTHER PREPROCEDURAL EXAMINATION: ICD-10-CM

## 2021-09-20 LAB — SARS-COV-2 N GENE NPH QL NAA+PROBE: NOT DETECTED

## 2021-09-21 ENCOUNTER — NON-APPOINTMENT (OUTPATIENT)
Age: 81
End: 2021-09-21

## 2021-09-22 ENCOUNTER — OUTPATIENT (OUTPATIENT)
Dept: OUTPATIENT SERVICES | Facility: HOSPITAL | Age: 81
LOS: 1 days | End: 2021-09-22
Payer: MEDICARE

## 2021-09-22 VITALS
OXYGEN SATURATION: 98 % | DIASTOLIC BLOOD PRESSURE: 71 MMHG | RESPIRATION RATE: 16 BRPM | SYSTOLIC BLOOD PRESSURE: 134 MMHG | HEART RATE: 71 BPM

## 2021-09-22 VITALS
OXYGEN SATURATION: 98 % | RESPIRATION RATE: 18 BRPM | DIASTOLIC BLOOD PRESSURE: 87 MMHG | SYSTOLIC BLOOD PRESSURE: 136 MMHG

## 2021-09-22 DIAGNOSIS — Z90.710 ACQUIRED ABSENCE OF BOTH CERVIX AND UTERUS: Chronic | ICD-10-CM

## 2021-09-22 DIAGNOSIS — R94.39 ABNORMAL RESULT OF OTHER CARDIOVASCULAR FUNCTION STUDY: ICD-10-CM

## 2021-09-22 LAB
ALBUMIN SERPL ELPH-MCNC: 4.3 G/DL — SIGNIFICANT CHANGE UP (ref 3.3–5)
ALP SERPL-CCNC: 76 U/L — SIGNIFICANT CHANGE UP (ref 40–120)
ALT FLD-CCNC: 6 U/L — LOW (ref 10–45)
ANION GAP SERPL CALC-SCNC: 13 MMOL/L — SIGNIFICANT CHANGE UP (ref 5–17)
APTT BLD: 33.6 SEC — SIGNIFICANT CHANGE UP (ref 27.5–35.5)
AST SERPL-CCNC: 19 U/L — SIGNIFICANT CHANGE UP (ref 10–40)
BILIRUB SERPL-MCNC: 0.3 MG/DL — SIGNIFICANT CHANGE UP (ref 0.2–1.2)
BUN SERPL-MCNC: 19 MG/DL — SIGNIFICANT CHANGE UP (ref 7–23)
CALCIUM SERPL-MCNC: 9.8 MG/DL — SIGNIFICANT CHANGE UP (ref 8.4–10.5)
CHLORIDE SERPL-SCNC: 104 MMOL/L — SIGNIFICANT CHANGE UP (ref 96–108)
CO2 SERPL-SCNC: 24 MMOL/L — SIGNIFICANT CHANGE UP (ref 22–31)
CREAT SERPL-MCNC: 1.02 MG/DL — SIGNIFICANT CHANGE UP (ref 0.5–1.3)
GLUCOSE SERPL-MCNC: 93 MG/DL — SIGNIFICANT CHANGE UP (ref 70–99)
HCT VFR BLD CALC: 35.7 % — SIGNIFICANT CHANGE UP (ref 34.5–45)
HGB BLD-MCNC: 11.8 G/DL — SIGNIFICANT CHANGE UP (ref 11.5–15.5)
INR BLD: 1.15 RATIO — SIGNIFICANT CHANGE UP (ref 0.88–1.16)
MCHC RBC-ENTMCNC: 25.2 PG — LOW (ref 27–34)
MCHC RBC-ENTMCNC: 33.1 GM/DL — SIGNIFICANT CHANGE UP (ref 32–36)
MCV RBC AUTO: 76.3 FL — LOW (ref 80–100)
NRBC # BLD: 0 /100 WBCS — SIGNIFICANT CHANGE UP (ref 0–0)
PLATELET # BLD AUTO: 337 K/UL — SIGNIFICANT CHANGE UP (ref 150–400)
POTASSIUM SERPL-MCNC: 4.6 MMOL/L — SIGNIFICANT CHANGE UP (ref 3.5–5.3)
POTASSIUM SERPL-SCNC: 4.6 MMOL/L — SIGNIFICANT CHANGE UP (ref 3.5–5.3)
PROT SERPL-MCNC: 8.4 G/DL — HIGH (ref 6–8.3)
PROTHROM AB SERPL-ACNC: 13.7 SEC — HIGH (ref 10.6–13.6)
RBC # BLD: 4.68 M/UL — SIGNIFICANT CHANGE UP (ref 3.8–5.2)
RBC # FLD: 16.1 % — HIGH (ref 10.3–14.5)
SODIUM SERPL-SCNC: 141 MMOL/L — SIGNIFICANT CHANGE UP (ref 135–145)
WBC # BLD: 5.74 K/UL — SIGNIFICANT CHANGE UP (ref 3.8–10.5)
WBC # FLD AUTO: 5.74 K/UL — SIGNIFICANT CHANGE UP (ref 3.8–10.5)

## 2021-09-22 PROCEDURE — C1769: CPT

## 2021-09-22 PROCEDURE — 99152 MOD SED SAME PHYS/QHP 5/>YRS: CPT

## 2021-09-22 PROCEDURE — C1887: CPT

## 2021-09-22 PROCEDURE — 80053 COMPREHEN METABOLIC PANEL: CPT

## 2021-09-22 PROCEDURE — 93456 R HRT CORONARY ARTERY ANGIO: CPT | Mod: 26

## 2021-09-22 PROCEDURE — 85730 THROMBOPLASTIN TIME PARTIAL: CPT

## 2021-09-22 PROCEDURE — 93005 ELECTROCARDIOGRAM TRACING: CPT

## 2021-09-22 PROCEDURE — C1894: CPT

## 2021-09-22 PROCEDURE — 99153 MOD SED SAME PHYS/QHP EA: CPT

## 2021-09-22 PROCEDURE — 93010 ELECTROCARDIOGRAM REPORT: CPT

## 2021-09-22 PROCEDURE — 85610 PROTHROMBIN TIME: CPT

## 2021-09-22 PROCEDURE — 85027 COMPLETE CBC AUTOMATED: CPT

## 2021-09-22 PROCEDURE — C1889: CPT

## 2021-09-22 PROCEDURE — 93456 R HRT CORONARY ARTERY ANGIO: CPT

## 2021-09-22 RX ORDER — FUROSEMIDE 40 MG
1 TABLET ORAL
Qty: 0 | Refills: 0 | DISCHARGE

## 2021-09-22 RX ORDER — FONDAPARINUX SODIUM 2.5 MG/.5ML
1 INJECTION, SOLUTION SUBCUTANEOUS
Qty: 0 | Refills: 0 | DISCHARGE

## 2021-09-22 RX ORDER — FUROSEMIDE 40 MG
60 TABLET ORAL
Qty: 0 | Refills: 0 | DISCHARGE

## 2021-09-22 RX ORDER — CHOLECALCIFEROL (VITAMIN D3) 125 MCG
1 CAPSULE ORAL
Qty: 0 | Refills: 0 | DISCHARGE

## 2021-09-22 RX ORDER — METOPROLOL TARTRATE 50 MG
1 TABLET ORAL
Qty: 0 | Refills: 0 | DISCHARGE

## 2021-09-22 NOTE — H&P CARDIOLOGY - HISTORY OF PRESENT ILLNESS
80 yo F with PMhx atrial flutter,   HTN  presents for right an left heart cath. She  has been c/o dyspnea and can ambulate a half block before having SOB . She also has severe edema of her legs. Her lasix has been increased which has helped alleviated her SOB .     She had a CT chest which demonstrated possible pulmonary HTN .  She had a stress test at [facility] in 3/21 which revealed large moderate defect in the inferolateral apical lateral inferior and inferoseptal wall were partially reversible suggestive of infarct with mild jennifer-infarct ischemia. Mild right increase right ventricular tracer uptake. EF 65%.   She had elevated kappa lambda ratio and elevated serum and urine immunoglobulin. She had elevated BNP.    ECHO from 1/2021 revealed normal LV fxn and motion, moderate LAE , mild RV enlargement , severe R enlargement , mild-mod MR, moderate to severe TR, moderate pulm HTN.  82 yo F with PMhx atrial flutter on xarelto last taken on Sunday night 9/18 , HTN  presents for right an left heart cath. She  has been c/o dyspnea and can ambulate a half block before having SOB . SOB has been worsening over the past year.  She also has severe edema of her legs for 2 years . Her lasix has been increased, she is now on 40mg BID ,  which has helped alleviated her SOB .     She had a CT chest which demonstrated possible pulmonary HTN .  She had a stress test at [facility] in 3/21 which revealed large moderate defect in the inferolateral apical lateral inferior and inferoseptal wall were partially reversible suggestive of infarct with mild jennifer-infarct ischemia. Mild right increase right ventricular tracer uptake. EF 65%.   She had elevated kappa lambda ratio and elevated serum and urine immunoglobulin. She had elevated BNP.    ECHO from 1/2021 revealed normal LV fxn and motion, moderate LAE , mild RV enlargement , severe R enlargement , mild-mod MR, moderate to severe TR, moderate pulm HTN.     No fever or chills. No N/V/D or abd pain.  80 yo F with PMhx atrial flutter on xarelto last taken on Sunday night 9/18 , HTN  presents for right an left heart cath. She  has been c/o dyspnea and can ambulate a half block before having SOB . SOB has been worsening over the past year.  She also has severe edema of her legs for 2 years . Her lasix has been increased, she is now on 40mg BID ,  which has helped alleviated her SOB .     She had a CT chest which demonstrated possible pulmonary HTN .  She had a stress test at [facility] in 3/21 which revealed large moderate defect in the inferolateral apical lateral inferior and inferoseptal wall were partially reversible suggestive of infarct with mild jennifer-infarct ischemia. Mild right increase right ventricular tracer uptake. EF 65%.   She had elevated kappa lambda ratio and elevated serum and urine immunoglobulin. She had elevated BNP.    ECHO from 1/2021 revealed normal LV fxn and motion, moderate LAE , mild RV enlargement , severe R enlargement , mild-mod MR, moderate to severe TR, moderate pulm HTN.     No fever or chills. No N/V/D or abd pain. COVID PCR negative on 9/19 , COVID Pfizer vaccine x2 received last dose 4/2021 .

## 2021-09-22 NOTE — H&P CARDIOLOGY - NSICDXPASTMEDICALHX_GEN_ALL_CORE_FT
PAST MEDICAL HISTORY:  Atrial flutter     Bronchitis     Cyst, ovarian     HTN (hypertension)     Obesity (BMI 30-39.9)

## 2021-09-22 NOTE — H&P CARDIOLOGY - NSICDXPASTSURGICALHX_GEN_ALL_CORE_FT
PAST SURGICAL HISTORY:  angiogram of RLE to R/O DVT-  ~ 3-4 years ago- Negative test angiogram of RLE to R/O DVT-  ~ 3-4 years ago- Negative test    S/P dilatation and curettage in 1994     PAST SURGICAL HISTORY:  angiogram of RLE to R/O DVT-  ~ 3-4 years ago- Negative test angiogram of RLE to R/O DVT-  ~ 3-4 years ago- Negative test    S/P dilatation and curettage in 1994    S/P hysterectomy

## 2021-09-22 NOTE — ASU DISCHARGE PLAN (ADULT/PEDIATRIC) - CARE PROVIDER_API CALL
Jamie Owens  CARDIOLOGY  [address], [address]  [place] [address]  Phone: [phone]  Fax: [phone]  Established Patient  Follow Up Time: 2 weeks

## 2021-10-25 ENCOUNTER — RX RENEWAL (OUTPATIENT)
Age: 81
End: 2021-10-25

## 2022-01-24 PROBLEM — I48.92 UNSPECIFIED ATRIAL FLUTTER: Chronic | Status: ACTIVE | Noted: 2021-09-22

## 2022-02-13 ENCOUNTER — OUTPATIENT (OUTPATIENT)
Dept: OUTPATIENT SERVICES | Facility: HOSPITAL | Age: 82
LOS: 1 days | Discharge: HOME | End: 2022-02-13

## 2022-02-13 DIAGNOSIS — D89.0 POLYCLONAL HYPERGAMMAGLOBULINEMIA: ICD-10-CM

## 2022-02-13 DIAGNOSIS — Z90.710 ACQUIRED ABSENCE OF BOTH CERVIX AND UTERUS: Chronic | ICD-10-CM

## 2022-02-16 ENCOUNTER — RESULT REVIEW (OUTPATIENT)
Age: 82
End: 2022-02-16

## 2022-02-16 ENCOUNTER — APPOINTMENT (OUTPATIENT)
Dept: HEMATOLOGY ONCOLOGY | Facility: CLINIC | Age: 82
End: 2022-02-16
Payer: MEDICARE

## 2022-02-16 VITALS
DIASTOLIC BLOOD PRESSURE: 71 MMHG | RESPIRATION RATE: 16 BRPM | HEART RATE: 72 BPM | WEIGHT: 181 LBS | TEMPERATURE: 97.4 F | OXYGEN SATURATION: 99 % | SYSTOLIC BLOOD PRESSURE: 112 MMHG | BODY MASS INDEX: 36.56 KG/M2

## 2022-02-16 LAB
BASOPHILS # BLD AUTO: 0.04 K/UL — SIGNIFICANT CHANGE UP (ref 0–0.2)
BASOPHILS NFR BLD AUTO: 0.7 % — SIGNIFICANT CHANGE UP (ref 0–2)
EOSINOPHIL # BLD AUTO: 0.17 K/UL — SIGNIFICANT CHANGE UP (ref 0–0.5)
EOSINOPHIL NFR BLD AUTO: 3 % — SIGNIFICANT CHANGE UP (ref 0–6)
HCT VFR BLD CALC: 33.7 % — LOW (ref 34.5–45)
HGB BLD-MCNC: 11.2 G/DL — LOW (ref 11.5–15.5)
IMM GRANULOCYTES NFR BLD AUTO: 0.7 % — SIGNIFICANT CHANGE UP (ref 0–1.5)
LYMPHOCYTES # BLD AUTO: 1.16 K/UL — SIGNIFICANT CHANGE UP (ref 1–3.3)
LYMPHOCYTES # BLD AUTO: 20.3 % — SIGNIFICANT CHANGE UP (ref 13–44)
MCHC RBC-ENTMCNC: 25.3 PG — LOW (ref 27–34)
MCHC RBC-ENTMCNC: 33.2 G/DL — SIGNIFICANT CHANGE UP (ref 32–36)
MCV RBC AUTO: 76.1 FL — LOW (ref 80–100)
MONOCYTES # BLD AUTO: 0.49 K/UL — SIGNIFICANT CHANGE UP (ref 0–0.9)
MONOCYTES NFR BLD AUTO: 8.6 % — SIGNIFICANT CHANGE UP (ref 2–14)
NEUTROPHILS # BLD AUTO: 3.81 K/UL — SIGNIFICANT CHANGE UP (ref 1.8–7.4)
NEUTROPHILS NFR BLD AUTO: 66.7 % — SIGNIFICANT CHANGE UP (ref 43–77)
NRBC # BLD: 0 /100 WBCS — SIGNIFICANT CHANGE UP (ref 0–0)
PLATELET # BLD AUTO: 276 K/UL — SIGNIFICANT CHANGE UP (ref 150–400)
RBC # BLD: 4.43 M/UL — SIGNIFICANT CHANGE UP (ref 3.8–5.2)
RBC # FLD: 16.5 % — HIGH (ref 10.3–14.5)
WBC # BLD: 5.71 K/UL — SIGNIFICANT CHANGE UP (ref 3.8–10.5)
WBC # FLD AUTO: 5.71 K/UL — SIGNIFICANT CHANGE UP (ref 3.8–10.5)

## 2022-02-16 PROCEDURE — 99213 OFFICE O/P EST LOW 20 MIN: CPT

## 2022-02-17 NOTE — HISTORY OF PRESENT ILLNESS
[de-identified] : 82yo F w/ afib on Xarelto, HTN, lymphedema referred for gammopathy, r/o amyloidosis. \par \par She is seeing Dr. Jamie Owens cardiology. Pt notes having dyspnea with minimal exertion. She has severe edema of LE from lymphedema for which she is on lasix 60mg BID. She had echo 1/27/21: normal LV function and wall motion. Moderate LAE, mild RV enlargement, severe RA enlargement, mild-moderate MR and moderate to severe TR. PASP approx 52 c/w moderate pulmonary HTN\par She had a CT chest 2/12/21 which showed cardiomegaly, mostly affecting the right chambers consistent with pulmonary HTN. 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 She is here for evaluation for amyloid. She is awaiting technetium pyrophosphate scan. \par \par She received both COVID vaccine doses [de-identified] : She did not get fat pad done. Unclear if she had technetium pyrophosphate scan. \par \par She now returns for f/u for new anemia. Labs from 12/30/21 showed Hgb 9.6. Hgb from 1/11/22 was 9.3. HbEP showed HbC trait. Iron studies were normal - Fe 55, TIBC 308, TSAT 18%, ferritin 48. B12 514, folate 11.8. \par She saw GI last week - started on a new medication for stomach (does not recall what it is).

## 2022-02-17 NOTE — CONSULT LETTER
[Dear  ___] : Dear  [unfilled], [Consult Letter:] : I had the pleasure of evaluating your patient, [unfilled]. [Please see my note below.] : Please see my note below. [Consult Closing:] : Thank you very much for allowing me to participate in the care of this patient.  If you have any questions, please do not hesitate to contact me. [Sincerely,] : Sincerely, [FreeTextEntry3] : Zora Navarro MD\par Hematology\par \par [facility]\par [address]\[place] [address]\par

## 2022-02-17 NOTE — ASSESSMENT
[FreeTextEntry1] : 81yo F w/ afib on Xarelto, HTN, lymphedema referred back for anemia. \par Hgb improved today to 11.2. She was recently started a medication by her gastroenterologist - unclear what it is. She has Hb C trait which I reviewed with patient and with daughter. She likely also has an underlying thalassemia given her microcytosis. Iron studies wnl. At this time, will monitor pt's bloodwork \par All questions answered\par RTC 6 mo

## 2022-02-17 NOTE — PHYSICAL EXAM
[Normal] : affect appropriate [de-identified] : ambulates w/ cane [de-identified] : b/l LE edema

## 2022-08-14 ENCOUNTER — OUTPATIENT (OUTPATIENT)
Dept: OUTPATIENT SERVICES | Facility: HOSPITAL | Age: 82
LOS: 1 days | Discharge: HOME | End: 2022-08-14

## 2022-08-14 DIAGNOSIS — Z90.710 ACQUIRED ABSENCE OF BOTH CERVIX AND UTERUS: Chronic | ICD-10-CM

## 2022-08-14 DIAGNOSIS — D89.0 POLYCLONAL HYPERGAMMAGLOBULINEMIA: ICD-10-CM

## 2022-08-17 ENCOUNTER — APPOINTMENT (OUTPATIENT)
Dept: HEMATOLOGY ONCOLOGY | Facility: CLINIC | Age: 82
End: 2022-08-17

## 2022-08-23 ENCOUNTER — EMERGENCY (EMERGENCY)
Facility: HOSPITAL | Age: 82
LOS: 1 days | Discharge: HOME | End: 2022-08-23
Attending: EMERGENCY MEDICINE
Payer: MEDICARE

## 2022-08-23 VITALS
SYSTOLIC BLOOD PRESSURE: 150 MMHG | OXYGEN SATURATION: 95 % | TEMPERATURE: 97 F | DIASTOLIC BLOOD PRESSURE: 76 MMHG | RESPIRATION RATE: 18 BRPM | HEART RATE: 68 BPM

## 2022-08-23 VITALS
HEART RATE: 77 BPM | DIASTOLIC BLOOD PRESSURE: 78 MMHG | RESPIRATION RATE: 18 BRPM | WEIGHT: 175.05 LBS | TEMPERATURE: 98 F | HEIGHT: 59 IN | OXYGEN SATURATION: 95 % | SYSTOLIC BLOOD PRESSURE: 121 MMHG

## 2022-08-23 DIAGNOSIS — Z90.710 ACQUIRED ABSENCE OF BOTH CERVIX AND UTERUS: Chronic | ICD-10-CM

## 2022-08-23 LAB
ALBUMIN SERPL ELPH-MCNC: 4.8 G/DL — SIGNIFICANT CHANGE UP (ref 3.3–5)
ALP SERPL-CCNC: 69 U/L — SIGNIFICANT CHANGE UP (ref 40–120)
ALT FLD-CCNC: 7 U/L — LOW (ref 10–45)
ANION GAP SERPL CALC-SCNC: 11 MMOL/L — SIGNIFICANT CHANGE UP (ref 5–17)
APTT BLD: 48.3 SEC — HIGH (ref 27.5–35.5)
AST SERPL-CCNC: 24 U/L — SIGNIFICANT CHANGE UP (ref 10–40)
BASOPHILS # BLD AUTO: 0.04 K/UL — SIGNIFICANT CHANGE UP (ref 0–0.2)
BASOPHILS NFR BLD AUTO: 0.8 % — SIGNIFICANT CHANGE UP (ref 0–2)
BILIRUB SERPL-MCNC: 0.4 MG/DL — SIGNIFICANT CHANGE UP (ref 0.2–1.2)
BLD GP AB SCN SERPL QL: NEGATIVE — SIGNIFICANT CHANGE UP
BUN SERPL-MCNC: 25 MG/DL — HIGH (ref 7–23)
CALCIUM SERPL-MCNC: 10.3 MG/DL — SIGNIFICANT CHANGE UP (ref 8.4–10.5)
CHLORIDE SERPL-SCNC: 100 MMOL/L — SIGNIFICANT CHANGE UP (ref 96–108)
CO2 SERPL-SCNC: 28 MMOL/L — SIGNIFICANT CHANGE UP (ref 22–31)
CREAT SERPL-MCNC: 1 MG/DL — SIGNIFICANT CHANGE UP (ref 0.5–1.3)
EGFR: 56 ML/MIN/1.73M2 — LOW
EOSINOPHIL # BLD AUTO: 0.18 K/UL — SIGNIFICANT CHANGE UP (ref 0–0.5)
EOSINOPHIL NFR BLD AUTO: 3.6 % — SIGNIFICANT CHANGE UP (ref 0–6)
GLUCOSE SERPL-MCNC: 91 MG/DL — SIGNIFICANT CHANGE UP (ref 70–99)
HCT VFR BLD CALC: 37.6 % — SIGNIFICANT CHANGE UP (ref 34.5–45)
HGB BLD-MCNC: 12.8 G/DL — SIGNIFICANT CHANGE UP (ref 11.5–15.5)
IMM GRANULOCYTES NFR BLD AUTO: 0.4 % — SIGNIFICANT CHANGE UP (ref 0–1.5)
INR BLD: 2.16 RATIO — HIGH (ref 0.88–1.16)
LYMPHOCYTES # BLD AUTO: 1.29 K/UL — SIGNIFICANT CHANGE UP (ref 1–3.3)
LYMPHOCYTES # BLD AUTO: 25.5 % — SIGNIFICANT CHANGE UP (ref 13–44)
MCHC RBC-ENTMCNC: 27.4 PG — SIGNIFICANT CHANGE UP (ref 27–34)
MCHC RBC-ENTMCNC: 34 GM/DL — SIGNIFICANT CHANGE UP (ref 32–36)
MCV RBC AUTO: 80.3 FL — SIGNIFICANT CHANGE UP (ref 80–100)
MONOCYTES # BLD AUTO: 0.56 K/UL — SIGNIFICANT CHANGE UP (ref 0–0.9)
MONOCYTES NFR BLD AUTO: 11.1 % — SIGNIFICANT CHANGE UP (ref 2–14)
NEUTROPHILS # BLD AUTO: 2.96 K/UL — SIGNIFICANT CHANGE UP (ref 1.8–7.4)
NEUTROPHILS NFR BLD AUTO: 58.6 % — SIGNIFICANT CHANGE UP (ref 43–77)
NRBC # BLD: 0 /100 WBCS — SIGNIFICANT CHANGE UP (ref 0–0)
OB PNL STL: NEGATIVE — SIGNIFICANT CHANGE UP
PLATELET # BLD AUTO: 226 K/UL — SIGNIFICANT CHANGE UP (ref 150–400)
POTASSIUM SERPL-MCNC: 4.3 MMOL/L — SIGNIFICANT CHANGE UP (ref 3.5–5.3)
POTASSIUM SERPL-SCNC: 4.3 MMOL/L — SIGNIFICANT CHANGE UP (ref 3.5–5.3)
PROT SERPL-MCNC: 8.2 G/DL — SIGNIFICANT CHANGE UP (ref 6–8.3)
PROTHROM AB SERPL-ACNC: 25.3 SEC — HIGH (ref 10.5–13.4)
RBC # BLD: 4.68 M/UL — SIGNIFICANT CHANGE UP (ref 3.8–5.2)
RBC # FLD: 15.8 % — HIGH (ref 10.3–14.5)
RH IG SCN BLD-IMP: POSITIVE — SIGNIFICANT CHANGE UP
SARS-COV-2 RNA SPEC QL NAA+PROBE: SIGNIFICANT CHANGE UP
SODIUM SERPL-SCNC: 139 MMOL/L — SIGNIFICANT CHANGE UP (ref 135–145)
WBC # BLD: 5.05 K/UL — SIGNIFICANT CHANGE UP (ref 3.8–10.5)
WBC # FLD AUTO: 5.05 K/UL — SIGNIFICANT CHANGE UP (ref 3.8–10.5)

## 2022-08-23 PROCEDURE — 86900 BLOOD TYPING SEROLOGIC ABO: CPT

## 2022-08-23 PROCEDURE — 99284 EMERGENCY DEPT VISIT MOD MDM: CPT | Mod: 25

## 2022-08-23 PROCEDURE — 86901 BLOOD TYPING SEROLOGIC RH(D): CPT

## 2022-08-23 PROCEDURE — 99285 EMERGENCY DEPT VISIT HI MDM: CPT | Mod: 25

## 2022-08-23 PROCEDURE — U0005: CPT

## 2022-08-23 PROCEDURE — 71045 X-RAY EXAM CHEST 1 VIEW: CPT | Mod: 26

## 2022-08-23 PROCEDURE — 85025 COMPLETE CBC W/AUTO DIFF WBC: CPT

## 2022-08-23 PROCEDURE — 71045 X-RAY EXAM CHEST 1 VIEW: CPT

## 2022-08-23 PROCEDURE — 82272 OCCULT BLD FECES 1-3 TESTS: CPT

## 2022-08-23 PROCEDURE — 85610 PROTHROMBIN TIME: CPT

## 2022-08-23 PROCEDURE — 93005 ELECTROCARDIOGRAM TRACING: CPT

## 2022-08-23 PROCEDURE — 80053 COMPREHEN METABOLIC PANEL: CPT

## 2022-08-23 PROCEDURE — 93010 ELECTROCARDIOGRAM REPORT: CPT

## 2022-08-23 PROCEDURE — 86850 RBC ANTIBODY SCREEN: CPT

## 2022-08-23 PROCEDURE — 85730 THROMBOPLASTIN TIME PARTIAL: CPT

## 2022-08-23 PROCEDURE — U0003: CPT

## 2022-08-23 NOTE — ED PROVIDER NOTE - PATIENT PORTAL LINK FT
You can access the FollowMyHealth Patient Portal offered by [facility] by registering at the following website: [email or web address]. By joining [employer]’s FollowMyHealth portal, you will also be able to view your health information using other applications (apps) compatible with our system.

## 2022-08-23 NOTE — ED ADULT NURSE NOTE - OBJECTIVE STATEMENT
Pt presents to the ED c/o rectal bleed. PMH Afib on Xarelto, HTN, HDL, leg swelling. As per pt she has been bleeding x2weeks, went to see her primary MD for clearance for a dental procedure and was told to come to the ED. Pt is AOX4. breathing unlabored N RA symmetrical rise and fall of the chest. Abdomen is soft and nondistended. Skin is warm and dry to touch. Pt denies any CP, SOB, fever, chills, N//V. On stretcher at lowest position.

## 2022-08-23 NOTE — ED PROVIDER NOTE - NSICDXPASTSURGICALHX_GEN_ALL_CORE_FT
PAST SURGICAL HISTORY:  angiogram of RLE to R/O DVT-  ~ 3-4 years ago- Negative test angiogram of RLE to R/O DVT-  ~ 3-4 years ago- Negative test    S/P dilatation and curettage in 1994    S/P hysterectomy

## 2022-08-23 NOTE — ED ADULT NURSE NOTE - NSIMPLEMENTINTERV_GEN_ALL_ED
Implemented All Fall with Harm Risk Interventions:  [place] to call system. Call bell, personal items and telephone within reach. Instruct patient to call for assistance. Room bathroom lighting operational. Non-slip footwear when patient is off stretcher. Physically safe environment: no spills, clutter or unnecessary equipment. Stretcher in lowest position, wheels locked, appropriate side rails in place. Provide visual cue, wrist band, yellow gown, etc. Monitor gait and stability. Monitor for mental status changes and reorient to person, place, and time. Review medications for side effects contributing to fall risk. Reinforce activity limits and safety measures with patient and family. Provide visual clues: red socks.

## 2022-08-23 NOTE — ED PROVIDER NOTE - ATTENDING APP SHARED VISIT CONTRIBUTION OF CARE
RGUJRAL 81yo f hx listed presents with brb when wiping after a BM. Pt states she has history of constipation and does strain at times. Denies any dark stools or clots. No abd pain, n/v, weakness, lightheadedness. Pt spoke to PCP and sent in for eval since patient is xarelto. Pt has had colonscopy and has a gastroenterologist.  On exam, Patient is awake,alert,oriented x 3. Patient is well appearing and in no acute distress. Patient's chest is clear to ausculation, +s1s2. Abdomen is soft nd/nt +BS. Extremity with no swelling or calf tenderness.  Rectal exam per resident.   Check labs, EKG and eval. RGUJRAL 83yo f hx listed presents with brb when wiping after a BM. Pt states she has history of constipation and does strain at times. Denies any dark stools or clots. No abd pain, n/v, weakness, lightheadedness. Pt spoke to PCP and sent in for eval since patient is xarelto. Pt has had colonscopy and has a gastroenterologist.  On exam, Patient is awake,alert,oriented x 3. Patient is well appearing and in no acute distress. Patient's chest is clear to ausculation, +s1s2. Abdomen is soft nd/nt +BS. Extremity with no swelling or calf tenderness.  Rectal exam per resident.   Check labs, EKG and eval.    Discussed plan with patient and daughter, states they would prefer to go home and monitor symptoms, will follow up GI and strict return precautions discussed. Discussed to manage constipation and straining.

## 2022-08-23 NOTE — ED PROVIDER NOTE - OBJECTIVE STATEMENT
82 year old female history of HTN, CHF, afib on xarelto 82 year old female history of HTN, CHF, afib on xarelto sent in by her PCP Dr Ric Broderick for BRBPR.  Accompanied by her daughter that is assisting with history.  Has been noticing BRBPR only on he tissue for about 2 weeks.  Pt has not complained or rectal pain, no dark stool, no belly pain NVD.  No chest pain, SoB, or fevers, no increased fatigue.  HAs had something similar in the past but resolved on its own.  Blood only on the tissue not in the toilet.  No recent changes in mediaction. 82 year old female history of HTN, CHF, afib on Xarelto sent in by her PCP Dr Ric Broderick for BRBPR.  Accompanied by her daughter that is assisting with history.  Has been noticing BRBPR only on he tissue for about 2 weeks.  Pt has not complained or rectal pain, no dark stool, no belly pain NVD.  No chest pain, SoB, or fevers, no increased fatigue.  HAs had something similar in the past but resolved on its own.  Blood only on the tissue not in the toilet.  No recent changes in medication.

## 2022-08-23 NOTE — ED PROVIDER NOTE - GASTROINTESTINAL, MLM
Abdomen soft, non-tender, no guarding. Abdomen soft, non-tender, no guarding.  Light brown stool on JONE wihtout any BRB.

## 2022-08-23 NOTE — ED PROVIDER NOTE - CARDIAC, MLM
Normal rate, regular rhythm.  Heart sounds S1, S2.  No murmurs, rubs or gallops. S1 S2 tachy irregular.

## 2022-08-23 NOTE — ED PROVIDER NOTE - NSFOLLOWUPINSTRUCTIONS_ED_ALL_ED_FT
1- Stool softeners if she is constipated, you can start colace follow dosing instruction s on the box  2- Continue all medications as prescribed  3- Follow up with you GI doctor or our clinic  4- If you have any worsening bleeding, you're more tired, feel lightheaded, have chest pain, shortness of breath or any new or worsening complaints come back to the ER immediately

## 2022-08-23 NOTE — ED PROVIDER NOTE - NSFOLLOWUPCLINICS_GEN_ALL_ED_FT
[facility]  Gastroenterology  [address] [place] [address] [address]  [place] [address]  Phone: [phone]  Fax:

## 2022-08-23 NOTE — ED PROVIDER NOTE - PROGRESS NOTE DETAILS
Blood count stable, pt feeling well.  Discussed blood work results with both patient as well as daughter.  They both feel comfortable going home, have a private GI doc hat they will call today.  Will put referral into our system for GI to make sure she is able to see hers in an appropriate amount of time.  Return precautions discussed.  Nila

## 2022-09-22 ENCOUNTER — RESULT REVIEW (OUTPATIENT)
Age: 82
End: 2022-09-22

## 2022-09-22 ENCOUNTER — APPOINTMENT (OUTPATIENT)
Dept: HEMATOLOGY ONCOLOGY | Facility: CLINIC | Age: 82
End: 2022-09-22

## 2022-09-22 VITALS
TEMPERATURE: 97.1 F | SYSTOLIC BLOOD PRESSURE: 130 MMHG | DIASTOLIC BLOOD PRESSURE: 79 MMHG | WEIGHT: 179 LBS | OXYGEN SATURATION: 94 % | RESPIRATION RATE: 15 BRPM | BODY MASS INDEX: 36.15 KG/M2 | HEART RATE: 65 BPM

## 2022-09-22 DIAGNOSIS — D64.9 ANEMIA, UNSPECIFIED: ICD-10-CM

## 2022-09-22 LAB
BASOPHILS # BLD AUTO: 0.04 K/UL — SIGNIFICANT CHANGE UP (ref 0–0.2)
BASOPHILS NFR BLD AUTO: 1 % — SIGNIFICANT CHANGE UP (ref 0–2)
EOSINOPHIL # BLD AUTO: 0.15 K/UL — SIGNIFICANT CHANGE UP (ref 0–0.5)
EOSINOPHIL NFR BLD AUTO: 3.6 % — SIGNIFICANT CHANGE UP (ref 0–6)
HCT VFR BLD CALC: 35.3 % — SIGNIFICANT CHANGE UP (ref 34.5–45)
HGB BLD-MCNC: 12.2 G/DL — SIGNIFICANT CHANGE UP (ref 11.5–15.5)
IMM GRANULOCYTES NFR BLD AUTO: 0.2 % — SIGNIFICANT CHANGE UP (ref 0–0.9)
LYMPHOCYTES # BLD AUTO: 1.12 K/UL — SIGNIFICANT CHANGE UP (ref 1–3.3)
LYMPHOCYTES # BLD AUTO: 26.7 % — SIGNIFICANT CHANGE UP (ref 13–44)
MCHC RBC-ENTMCNC: 27.4 PG — SIGNIFICANT CHANGE UP (ref 27–34)
MCHC RBC-ENTMCNC: 34.6 G/DL — SIGNIFICANT CHANGE UP (ref 32–36)
MCV RBC AUTO: 79.1 FL — LOW (ref 80–100)
MONOCYTES # BLD AUTO: 0.44 K/UL — SIGNIFICANT CHANGE UP (ref 0–0.9)
MONOCYTES NFR BLD AUTO: 10.5 % — SIGNIFICANT CHANGE UP (ref 2–14)
NEUTROPHILS # BLD AUTO: 2.43 K/UL — SIGNIFICANT CHANGE UP (ref 1.8–7.4)
NEUTROPHILS NFR BLD AUTO: 58 % — SIGNIFICANT CHANGE UP (ref 43–77)
NRBC # BLD: 0 /100 WBCS — SIGNIFICANT CHANGE UP (ref 0–0)
PLATELET # BLD AUTO: 203 K/UL — SIGNIFICANT CHANGE UP (ref 150–400)
RBC # BLD: 4.46 M/UL — SIGNIFICANT CHANGE UP (ref 3.8–5.2)
RBC # FLD: 15.6 % — HIGH (ref 10.3–14.5)
WBC # BLD: 4.19 K/UL — SIGNIFICANT CHANGE UP (ref 3.8–10.5)
WBC # FLD AUTO: 4.19 K/UL — SIGNIFICANT CHANGE UP (ref 3.8–10.5)

## 2022-09-22 PROCEDURE — 99213 OFFICE O/P EST LOW 20 MIN: CPT

## 2022-09-22 NOTE — CONSULT LETTER
[Dear  ___] : Dear  [unfilled], [Consult Letter:] : I had the pleasure of evaluating your patient, [unfilled]. [Please see my note below.] : Please see my note below. [Consult Closing:] : Thank you very much for allowing me to participate in the care of this patient.  If you have any questions, please do not hesitate to contact me. [Sincerely,] : Sincerely, [FreeTextEntry3] : Zora Navarro MD\par Hematology\par \par [facility]\par [address]\[place] [address]\par

## 2022-09-22 NOTE — ASSESSMENT
[FreeTextEntry1] : 79yo F w/ afib on Xarelto, HTN, lymphedema referred back for anemia. \par Hgb improved today to 12.2.\par She has Hb C trait. She likely also has an underlying thalassemia given her microcytosis. Iron studies wnl. At this time, will monitor pt's bloodwork \par All questions answered\par RTC as needed

## 2022-09-22 NOTE — HISTORY OF PRESENT ILLNESS
[de-identified] : 82yo F w/ afib on Xarelto, HTN, lymphedema referred for gammopathy, r/o amyloidosis. \par \par She is seeing Dr. Jamie Owens cardiology. Pt notes having dyspnea with minimal exertion. She has severe edema of LE from lymphedema for which she is on lasix 60mg BID. She had echo 1/27/21: normal LV function and wall motion. Moderate LAE, mild RV enlargement, severe RA enlargement, mild-moderate MR and moderate to severe TR. PASP approx 52 c/w moderate pulmonary HTN\par She had a CT chest 2/12/21 which showed cardiomegaly, mostly affecting the right chambers consistent with pulmonary HTN. 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 She is here for evaluation for amyloid. She is awaiting technetium pyrophosphate scan. \par \par She received both COVID vaccine doses [de-identified] : She did not get fat pad done. Unclear if she had technetium pyrophosphate scan. \par Labs from 12/30/21 showed Hgb 9.6. Hgb from 1/11/22 was 9.3. HbEP showed HbC trait. Iron studies were normal - Fe 55, TIBC 308, TSAT 18%, ferritin 48. B12 514, folate 11.8. It improved to 11.2 on visit in 2/2022. \par She has intermittent bleeding with BM so was advised to go to the ED. Stool occult was negative but was recommended to take Miralax.

## 2022-09-22 NOTE — PHYSICAL EXAM
[Normal] : affect appropriate [de-identified] : ambulates w/ cane [de-identified] : b/l LE edema

## 2022-09-29 ENCOUNTER — APPOINTMENT (OUTPATIENT)
Dept: GASTROENTEROLOGY | Facility: CLINIC | Age: 82
End: 2022-09-29

## 2022-11-01 ENCOUNTER — APPOINTMENT (OUTPATIENT)
Dept: CARDIOLOGY | Facility: CLINIC | Age: 82
End: 2022-11-01

## 2022-11-07 ENCOUNTER — APPOINTMENT (OUTPATIENT)
Dept: GASTROENTEROLOGY | Facility: CLINIC | Age: 82
End: 2022-11-07

## 2023-01-11 ENCOUNTER — APPOINTMENT (OUTPATIENT)
Dept: PULMONOLOGY | Facility: CLINIC | Age: 83
End: 2023-01-11
Payer: COMMERCIAL

## 2023-01-11 VITALS — SYSTOLIC BLOOD PRESSURE: 129 MMHG | OXYGEN SATURATION: 97 % | DIASTOLIC BLOOD PRESSURE: 75 MMHG | HEART RATE: 68 BPM

## 2023-01-11 DIAGNOSIS — I27.20 PULMONARY HYPERTENSION, UNSPECIFIED: ICD-10-CM

## 2023-01-11 PROCEDURE — 99214 OFFICE O/P EST MOD 30 MIN: CPT

## 2023-01-11 NOTE — HISTORY OF PRESENT ILLNESS
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 by cardiologist\par has not followed up with heme.\par \par was previously on anoro which helped her breathing but no longer taking bc ran out\par \par has pulm htn on cath? PASP 80\par \par says shes been feeling ok\par doing therapy for her lymphedema, leg wraps etc. legs still swollen but less than before\par can walk around at home, uses wheelchair outside of home.

## 2023-01-11 NOTE — ASSESSMENT
[FreeTextEntry1] : repeat ct chest now eval nodule \par \par needs fu with heme in regards to amyloid\par \par would hold off on vasodilator therapy until amyloid diagnosis is more clear, cardiac amyloid? patient without any respiratory complaints.

## 2023-01-11 NOTE — PROCEDURE
[FreeTextEntry1] : \par prior exams reviewed:\par \par HST: ahi 4, no evidence GRECIA\par \par \par Rheum panel--positive WIN\par \par PFT: moderate obstructive dysfunction.  Lung volumes normal. DLCO moderately reduced.\par \par \par Reviewed:\par CT chest [facility] 2/21/21: enlarged right chambers consistent with pulm htn, 3mm nodule RUL

## 2023-01-25 NOTE — ED PROVIDER NOTE - CPE EDP HEME LYMPH NORM
Okay to restart Orencia  
Patient Name: Ale  Caller Name:  Name of Facility:  Callback Number:Contact Information   [phone]  Best Availability:  Can a Detailed Message be left? Y  Fax Number:  Additional Information: Pt was in the hospital and they took her off of her Orencia ClickJect 125 MG/ML Solution Auto-injector and she does not see Malini until 2/8/23 and is out of medication and she would like to talk to a nurse  Did you confirm the message with the caller?  Patient has been advised of 24-48 hour call back policy?  
Patient is called and told she may restart her orencia today.  
She was taken off the Orencia due to a possible UTI and was in the hospital and they took her off it two weeks ago.  She has the click jet pens at home ( 3 )   She just got home Saturday 1/21/23 and she  Is home now and no fever and not on any antibiotics right now.  She was admitted for a fall and dizziness. Had  Rib injury noted with no fracture  Afebrile  Has fatigue  Still sore to ribs but can breath a deep breath in better now  Drinking well and pushing fluids  Wondering if she may re start the Orencia today?  
normal...

## 2023-02-05 ENCOUNTER — OUTPATIENT (OUTPATIENT)
Dept: OUTPATIENT SERVICES | Facility: HOSPITAL | Age: 83
LOS: 1 days | Discharge: HOME | End: 2023-02-05

## 2023-02-05 DIAGNOSIS — Z90.710 ACQUIRED ABSENCE OF BOTH CERVIX AND UTERUS: Chronic | ICD-10-CM

## 2023-02-05 DIAGNOSIS — D89.0 POLYCLONAL HYPERGAMMAGLOBULINEMIA: ICD-10-CM

## 2023-02-13 ENCOUNTER — APPOINTMENT (OUTPATIENT)
Dept: HEMATOLOGY ONCOLOGY | Facility: CLINIC | Age: 83
End: 2023-02-13

## 2023-02-23 ENCOUNTER — RESULT REVIEW (OUTPATIENT)
Age: 83
End: 2023-02-23

## 2023-02-23 ENCOUNTER — APPOINTMENT (OUTPATIENT)
Dept: HEMATOLOGY ONCOLOGY | Facility: CLINIC | Age: 83
End: 2023-02-23

## 2023-02-23 ENCOUNTER — APPOINTMENT (OUTPATIENT)
Dept: HEMATOLOGY ONCOLOGY | Facility: CLINIC | Age: 83
End: 2023-02-23
Payer: MEDICARE

## 2023-02-23 ENCOUNTER — LABORATORY RESULT (OUTPATIENT)
Age: 83
End: 2023-02-23

## 2023-02-23 VITALS
TEMPERATURE: 97.2 F | DIASTOLIC BLOOD PRESSURE: 75 MMHG | OXYGEN SATURATION: 98 % | WEIGHT: 143.96 LBS | BODY MASS INDEX: 29.08 KG/M2 | SYSTOLIC BLOOD PRESSURE: 142 MMHG | HEART RATE: 66 BPM | RESPIRATION RATE: 20 BRPM

## 2023-02-23 LAB
BASOPHILS # BLD AUTO: 0.03 K/UL — SIGNIFICANT CHANGE UP (ref 0–0.2)
BASOPHILS NFR BLD AUTO: 0.6 % — SIGNIFICANT CHANGE UP (ref 0–2)
EOSINOPHIL # BLD AUTO: 0.18 K/UL — SIGNIFICANT CHANGE UP (ref 0–0.5)
EOSINOPHIL NFR BLD AUTO: 3.8 % — SIGNIFICANT CHANGE UP (ref 0–6)
HCT VFR BLD CALC: 37.1 % — SIGNIFICANT CHANGE UP (ref 34.5–45)
HGB BLD-MCNC: 12.6 G/DL — SIGNIFICANT CHANGE UP (ref 11.5–15.5)
IMM GRANULOCYTES NFR BLD AUTO: 0.2 % — SIGNIFICANT CHANGE UP (ref 0–0.9)
LYMPHOCYTES # BLD AUTO: 1.02 K/UL — SIGNIFICANT CHANGE UP (ref 1–3.3)
LYMPHOCYTES # BLD AUTO: 21.6 % — SIGNIFICANT CHANGE UP (ref 13–44)
MCHC RBC-ENTMCNC: 27.3 PG — SIGNIFICANT CHANGE UP (ref 27–34)
MCHC RBC-ENTMCNC: 34 G/DL — SIGNIFICANT CHANGE UP (ref 32–36)
MCV RBC AUTO: 80.5 FL — SIGNIFICANT CHANGE UP (ref 80–100)
MONOCYTES # BLD AUTO: 0.38 K/UL — SIGNIFICANT CHANGE UP (ref 0–0.9)
MONOCYTES NFR BLD AUTO: 8 % — SIGNIFICANT CHANGE UP (ref 2–14)
NEUTROPHILS # BLD AUTO: 3.11 K/UL — SIGNIFICANT CHANGE UP (ref 1.8–7.4)
NEUTROPHILS NFR BLD AUTO: 65.8 % — SIGNIFICANT CHANGE UP (ref 43–77)
NRBC # BLD: 0 /100 WBCS — SIGNIFICANT CHANGE UP (ref 0–0)
PLATELET # BLD AUTO: 215 K/UL — SIGNIFICANT CHANGE UP (ref 150–400)
RBC # BLD: 4.61 M/UL — SIGNIFICANT CHANGE UP (ref 3.8–5.2)
RBC # FLD: 15.1 % — HIGH (ref 10.3–14.5)
WBC # BLD: 4.73 K/UL — SIGNIFICANT CHANGE UP (ref 3.8–10.5)
WBC # FLD AUTO: 4.73 K/UL — SIGNIFICANT CHANGE UP (ref 3.8–10.5)

## 2023-02-23 PROCEDURE — 99215 OFFICE O/P EST HI 40 MIN: CPT

## 2023-02-23 NOTE — HISTORY OF PRESENT ILLNESS
[de-identified] : 82yo F w/ afib on Xarelto, HTN, lymphedema referred for gammopathy, r/o amyloidosis. \par \par She is seeing Dr. Jamie Owens cardiology. Pt notes having dyspnea with minimal exertion. She has severe edema of LE from lymphedema for which she is on lasix 60mg BID. She had echo 1/27/21: normal LV function and wall motion. Moderate LAE, mild RV enlargement, severe RA enlargement, mild-moderate MR and moderate to severe TR. PASP approx 52 c/w moderate pulmonary HTN\par She had a CT chest 2/12/21 which showed cardiomegaly, mostly affecting the right chambers consistent with pulmonary HTN. 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 She is here for evaluation for amyloid. She is awaiting technetium pyrophosphate scan. \par \par She received both COVID vaccine doses [de-identified] : She did not get fat pad done. Labs from 12/30/21 showed Hgb 9.6. Hgb from 1/11/22 was 9.3. HbEP showed HbC trait. Iron studies were normal - Fe 55, TIBC 308, TSAT 18%, ferritin 48. B12 514, folate 11.8. It improved to 11.2 on visit in 2/2022. \par \par She was diagnosed with amyloid based on ? genetic testing from cardiologist Dr. Jamie Ware. \par I called Dr. Ware with pt and daughter. Pt has an elevated kappa/lambda ratio. Fax sent to us showed SFLC ratio of 2.16 (42.6/19.7) from 1/2021. 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 \par \par 7/2021 technetium pyrophosphate scan: Cardiac amyloid Imaging study is  not suggestive of transthyretin cardiac amyloidosis.\par \par

## 2023-02-23 NOTE — ASSESSMENT
[FreeTextEntry1] : 83yo F w/ afib on Xarelto, HTN, lymphedema referred back for amyloidosis\par Pt was previously seen for anemia. She has Hb C trait. She likely also has an underlying thalassemia given her microcytosis. Iron studies wnl. Her Hgb is now wnl\par I spoke with Dr. Ware today with pt and daughter present. Pt has an elevated kappa/lambda ratio. Fax sent to us showed SFLC ratio of 2.16 (42.6/19.7) from 1/2021. 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 \par 7/2021 technetium pyrophosphate scan: Cardiac amyloid Imaging study is  not suggestive of transthyretin cardiac amyloidosis. Dr. Ware will try to get another for f/u\par She does not appear that she has AL amyloid. Will repeat MM labs and will try again to get fat pad biopsy. \par RTC 2 mo for review\par

## 2023-03-08 ENCOUNTER — RESULT REVIEW (OUTPATIENT)
Age: 83
End: 2023-03-08

## 2023-03-13 LAB
FERRITIN SERPL-MCNC: 228 NG/ML
IRON SATN MFR SERPL: 19 %
IRON SERPL-MCNC: 55 UG/DL
TIBC SERPL-MCNC: 289 UG/DL
UIBC SERPL-MCNC: 233 UG/DL

## 2023-03-14 ENCOUNTER — OUTPATIENT (OUTPATIENT)
Dept: OUTPATIENT SERVICES | Facility: HOSPITAL | Age: 83
LOS: 1 days | End: 2023-03-14
Payer: MEDICARE

## 2023-03-14 ENCOUNTER — APPOINTMENT (OUTPATIENT)
Dept: ULTRASOUND IMAGING | Facility: IMAGING CENTER | Age: 83
End: 2023-03-14
Payer: MEDICARE

## 2023-03-14 ENCOUNTER — RESULT REVIEW (OUTPATIENT)
Age: 83
End: 2023-03-14

## 2023-03-14 DIAGNOSIS — E85.9 AMYLOIDOSIS, UNSPECIFIED: ICD-10-CM

## 2023-03-14 DIAGNOSIS — Z90.710 ACQUIRED ABSENCE OF BOTH CERVIX AND UTERUS: Chronic | ICD-10-CM

## 2023-03-14 PROCEDURE — 88305 TISSUE EXAM BY PATHOLOGIST: CPT

## 2023-03-14 PROCEDURE — 76942 ECHO GUIDE FOR BIOPSY: CPT

## 2023-03-14 PROCEDURE — 88313 SPECIAL STAINS GROUP 2: CPT | Mod: 26

## 2023-03-14 PROCEDURE — 20206 BIOPSY MUSCLE PERQ NEEDLE: CPT

## 2023-03-14 PROCEDURE — 88305 TISSUE EXAM BY PATHOLOGIST: CPT | Mod: 26

## 2023-03-14 PROCEDURE — 76942 ECHO GUIDE FOR BIOPSY: CPT | Mod: 26

## 2023-03-14 PROCEDURE — 88313 SPECIAL STAINS GROUP 2: CPT

## 2023-03-16 LAB — SURGICAL PATHOLOGY STUDY: SIGNIFICANT CHANGE UP

## 2023-03-27 ENCOUNTER — APPOINTMENT (OUTPATIENT)
Dept: NEUROLOGY | Facility: CLINIC | Age: 83
End: 2023-03-27
Payer: MEDICARE

## 2023-03-27 PROCEDURE — 99204 OFFICE O/P NEW MOD 45 MIN: CPT

## 2023-03-27 NOTE — HISTORY OF PRESENT ILLNESS
[FreeTextEntry1] : Aysha Kim is an 82 year old F with a PMHx TTR Amyloidosis, Afib, HTN, Lower extremity edema. She presents for initial evaluation in the Neurology clinic at [facility]. She was referred by her cardiologist, Dr. Ware, for neurological evaluation in the setting of amyloidosis.\par \par She has some trouble swallowing her pills. Sometimes coughs with them. She had a tooth pulled recently and was placed on amoxicillin last week. She reports getting tired. She walks around the house. Sometimes if she sits she cannot get up on her own. She uses two canes. \par \par Patient notes some confusion and disorientation but daughter has not noticed.\par No dizziness/lightheadedness.\par No nausea/vomiting. \par Sometimes she has numbness/tingling in the right hand. 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 No history of seizures or stroke. \par No headaches. \par She has constipation, but has regular bowel movements. \par No trouble with urination. \par She needs assistance with getting dressed. \par Once she stands her balance is okay.\par No pain.\par \par Family history: unremarkable\par Social history: she walks around as much as she can, but also stays social and continues to write and sew. She does have aids at home.

## 2023-03-27 NOTE — ED ADULT NURSE NOTE - DATE OF LAST VACCINATION
As long as she has no known allergy to the components she can get it. She should be able to find this on CDC website or even our COVID website.        HISTORY and PHYSICAL      CHIEF COMPLAINT:  Anxiety    Reason for Admission:  Anxiety    History Obtained From:  patient, chart    HISTORY OF PRESENT ILLNESS:      The patient is a 29 y.o. female who is admitted to the [place] [address] unit with worsening mood issues. She has no physical complaints. She has no CP or SOA. She has no abdominal pain or N/V. She has no dysuria. She has had no fevers. She has no pain complaints. Past Medical History:        Diagnosis Date    Acute anxiety 2022    Acute psychosis ([facility] 2020    Bipolar 1 disorder ([facility]     Bipolar depression ([facility] 2022    COPD (chronic obstructive pulmonary disease) ([facility])     Esophageal perforation     Genital herpes     Suicidal ideation     Tobacco abuse      Past Surgical History:        Procedure Laterality Date     SECTION  2020     SECTION  2022         Medications Prior to Admission:    Medications Prior to Admission: lamoTRIgine (LAMICTAL) 25 MG tablet, Take 2 tablets by mouth daily (Patient not taking: No sig reported)  risperiDONE (RISPERDAL M-TABS) 2 MG disintegrating tablet, Take 1 tablet by mouth 2 times daily as needed (anxiety) (Patient not taking: No sig reported)  apixaban (ELIQUIS) 5 MG TABS tablet, Take 1 tablet by mouth 2 times daily (Patient not taking: No sig reported)  traZODone (DESYREL) 50 MG tablet, Take 1 tablet by mouth nightly (Patient not taking: No sig reported)    Allergies:  Penicillins    Social History:   TOBACCO:   reports that she has quit smoking. Her smoking use included cigarettes. She started smoking about 10 years ago. She has a 9.00 pack-year smoking history. She has never used smokeless tobacco.  ETOH:   reports current alcohol use. DRUGS:   reports that she does not currently use drugs after having used the following drugs: Marijuana Osbaldo Semen).   MARITAL STATUS:  Not   OCCUPATION:  Not working        Localo History:       Problem Relation Age of Onset    No Known Problems Mother     No Known Problems Father     No Known Problems Sister     No Known Problems Sister     No Known Problems Brother     No Known Problems Son     No Known Problems Son     No Known Problems Daughter      REVIEW OF SYSTEMS:  Constitutional: neg  CV: neg  Pulmonary: neg  GI: neg  : neg  Psych: anxiety  Neuro: neg  Skin: neg  MusculoSkeletal: neg  HEENT: neg  Joints: neg    Vitals:  /74   Pulse (!) 101   Temp 97.7 °F (36.5 °C) (Temporal)   Resp 22   Ht 5' 3\" (1.6 m)   Wt 144 lb 6.4 oz (65.5 kg)   LMP  (LMP Unknown)   SpO2 99%   BMI 25.58 kg/m²     PHYSICAL EXAM:  Gen: NAD, alert  HEENT: WNL  Lymph: no LAD  Neck: no JVD or masses  Chest: CTA bilat  CV: RRR  Abdomen: NT/ND  Extrem: no C/C/E  Neuro: non focal  Skin: no rashes  Joints: no redness    DATA:  I have reviewed the admission labs and imaging tests.     ASSESSMENT AND PLAN:      Principal Problem:    Anxiety---follow with Psych    H/O PE/DVT--continue Eliquis    Anemia    [facility] Prabhakar Russell MD  11:02 PM 3/26/2023 23-Apr-2021

## 2023-03-27 NOTE — DISCUSSION/SUMMARY
[FreeTextEntry1] : Aysha Kim is an 82 year old F with a PMHx TTR Amyloidosis, Afib, HTN, Lower extremity edema. She presents for initial evaluation in the Neurology clinic at [facility]. She was referred by her cardiologist, Dr. Ware, for neurological evaluation in the setting of amyloidosis.\par \par At present, the patient's neurological examination is significant for mild parkinsonian features. However, cognition is intact. Her lower extremity edema significantly limits her mobility. \par Discussed in detail potential neurological manifestations of amyloidosis. Her daughter acknowledged her understanding.\par Patient is not interested in physical therapy at the moment. \par No further neurological work up recommended at this time, and no new medications recommended.\par \par RTC 6 months\par \par All questions were answered to their satisfaction. I spent 45 minutes on this encounter.

## 2023-03-28 NOTE — ED ADULT NURSE NOTE - HAVE YOU HAD A FIRST COVID-19 BOOSTER?
Subjective    Mr. Stubbs is 53 y.o. male    Chief Complaint: Kidney Stones    History of Present Illness  Patient is a 53-year-old gentleman here for 6-month follow-up with history of kidney stones.  He had a KUB prior to his appointment today.  He had perhaps 1 day where he felt pain and but no other stone episodes since he was last seen.  He has not had any gross hematuria or urinary tract infections.  He has had previous ureteroscopy laser lithotripsy with stent 09/19/2022.  This was for a left proximal ureteral stone he also has a stone in the lower part of the left kidney.    The following portions of the patient's history were reviewed and updated as appropriate: allergies, current medications, past family history, past medical history, past social history, past surgical history and problem list.    Review of Systems   Gastrointestinal: Negative.    Genitourinary: Negative.    Musculoskeletal: Negative.    All other systems reviewed and are negative.        Current Outpatient Medications:   •  levothyroxine (Synthroid) 88 MCG tablet, Take 1 tablet by mouth Daily., Disp: 30 tablet, Rfl: 1  •  lisinopril (PRINIVIL,ZESTRIL) 10 MG tablet, Take 1 tablet by mouth Daily., Disp: 90 tablet, Rfl: 1  •  omeprazole (priLOSEC) 40 MG capsule, Take 1 capsule by mouth Daily., Disp: 90 capsule, Rfl: 1  •  QUEtiapine (SEROquel) 100 MG tablet, Take 1 tablet by mouth Every Night., Disp: 90 tablet, Rfl: 1  •  rosuvastatin (CRESTOR) 20 MG tablet, Take 1 tablet by mouth every night at bedtime., Disp: 90 tablet, Rfl: 1  •  tamsulosin (FLOMAX) 0.4 MG capsule 24 hr capsule, TAKE 1 CAPSULE BY MOUTH DAILY FOR 14 DAYS, Disp: 90 capsule, Rfl: 11    Past Medical History:   Diagnosis Date   • Cataracts, both eyes    • Elevated cholesterol    • Gastric ulcer    • GERD (gastroesophageal reflux disease)    • Hyperlipidemia    • Hypertension    • Kidney stone    • Sleep apnea        Past Surgical History:   Procedure Laterality Date   • CATARACT  EXTRACTION WITH INTRAOCULAR LENS IMPLANT Bilateral    • COLONOSCOPY  08/11/2008    small polyp removed from the rectum,otherwise normal   • COLONOSCOPY N/A 07/15/2021    Procedure: COLONOSCOPY WITH ANESTHESIA;  Surgeon: Fuad Guerra DO;  Location: [facility];  Service: Gastroenterology;  Laterality: N/A;  pre op: hx polyps  post op:polyp  PCP: Arnulfo Snow MD   • CYSTOSCOPY W/ LASER LITHOTRIPSY     • CYSTOSCOPY, RETROGRADE PYELOGRAM AND STENT INSERTION Right 02/18/2021    Procedure: CYSTOSCOPY RETROGRADE PYELOGRAM AND STENT INSERTION;  Surgeon: Guicho Umana MD;  Location: [facility] OR;  Service: Urology;  Laterality: Right;   • ENDOSCOPY      for bleeding ulcer   • ENDOSCOPY N/A 07/15/2021    Procedure: ESOPHAGOGASTRODUODENOSCOPY WITH ANESTHESIA;  Surgeon: Fuad Guerra DO;  Location: [facility];  Service: Gastroenterology;  Laterality: N/A;  pre op: gerd  post op:esophagitis  PCP: Arnulfo Snow MD   • EXTRACORPOREAL SHOCK WAVE LITHOTRIPSY (ESWL) Right 03/29/2021    Procedure: EXTRACORPOREAL SHOCKWAVE LITHOTRIPSY;  Surgeon: Guicho Umana MD;  Location: [facility] OR;  Service: Urology;  Laterality: Right;   • EXTRACORPOREAL SHOCK WAVE LITHOTRIPSY (ESWL) Right 01/14/2022    Procedure: RIGHT EXTRACORPOREAL SHOCKWAVE LITHOTRIPSY;  Surgeon: Silverio Barrios MD;  Location: [facility] OR;  Service: Urology;  Laterality: Right;   • INCISION AND DRAINAGE LEG Left    • KIDNEY STONE SURGERY     • URETEROSCOPY LASER LITHOTRIPSY WITH STENT INSERTION Right 02/18/2021    Procedure: URETEROSCOPY LASER LITHOTRIPSY WITH STENT INSERTION;  Surgeon: Guicho Umana MD;  Location: [facility] OR;  Service: Urology;  Laterality: Right;   • URETEROSCOPY LASER LITHOTRIPSY WITH STENT INSERTION Right 03/04/2021    Procedure: URETEROSCOPY LASER LITHOTRIPSY WITH STENT INSERTION;  Surgeon: Guicho Umana MD;  Location: [facility] OR;  Service: Urology;  Laterality: Right;   • URETEROSCOPY LASER LITHOTRIPSY WITH  "STENT INSERTION Left 2022    Procedure: URETEROSCOPY LASER LITHOTRIPSY WITH STENT INSERTION;  Surgeon: Homar Loyd MD;  Location:  [facility] OR;  Service: Urology;  Laterality: Left;   • URETEROSCOPY LASER LITHOTRIPSY WITH STENT INSERTION Left 2022    Procedure: URETEROSCOPY LASER LITHOTRIPSY WITH STENT INSERTION ;  Surgeon: Homar Loyd MD;  Location:  [facility] OR;  Service: Urology;  Laterality: Left;       Social History     Socioeconomic History   • Marital status:    Tobacco Use   • Smoking status: Former     Packs/day: 1.00     Years: 10.00     Pack years: 10.00     Types: Cigars, Cigarettes     Quit date: 2021     Years since quittin.1   • Smokeless tobacco: Never   Vaping Use   • Vaping Use: Never used   Substance and Sexual Activity   • Alcohol use: Not Currently   • Drug use: Never   • Sexual activity: Defer       Family History   Problem Relation Age of Onset   • Colon cancer Father    • Hypertension Father    • Hypertension Mother    • Esophageal cancer Neg Hx        Objective    Temp 97.4 °F (36.3 °C)   Ht 160 cm (63\")   Wt 83 kg (183 lb)   BMI 32.42 kg/m²     Physical Exam  Vitals reviewed.   Constitutional:       General: He is not in acute distress.     Appearance: Normal appearance.   HENT:      Head: Normocephalic and atraumatic.   Pulmonary:      Effort: Pulmonary effort is normal.   Skin:     General: Skin is warm and dry.   Neurological:      Mental Status: He is alert and oriented to person, place, and time.   Psychiatric:         Mood and Affect: Mood normal.         Behavior: Behavior normal.             Results for orders placed or performed in visit on 23   POC Urinalysis Dipstick, Multipro    Specimen: Urine   Result Value Ref Range    Color Yellow Yellow, Straw, Dark Yellow, Tatyana    Clarity, UA Clear Clear    Glucose, UA Negative Negative mg/dL    Bilirubin Negative Negative    Ketones, UA Negative Negative    Specific Gravity  1.030 1.005 - 1.030 "    Blood, UA Negative Negative    pH, Urine 5.5 5.0 - 8.0    Protein, POC Negative Negative mg/dL    Urobilinogen, UA 0.2 E.U./dL Normal, 0.2 E.U./dL    Nitrite, UA Negative Negative    Leukocytes Negative Negative   IPSS Questionnaire (AUA-7):  Incomplete emptying  Over the past month, how often have you had a sensation of not emptying your bladder completely after you finish?: Less than half the time (04/04/23 0859)  Frequency  Over the past month, how often have you had to urinate again less than two hours after you finishing urinating ?: Less than half the time (04/04/23 0859)  Intermittency  Over the past month, how often have you found you stopped and started again several time when you urinated ?: Less than half the time (04/04/23 0859)  Urgency  Over the last month, how difficult  have you found it to postpone urination ?: Less than half the time (04/04/23 0859)  Weak Stream  Over the past month, how often have you had a weak urinary stream ?: About half the time (04/04/23 0859)  Straining  Over the past month, how often have you had to push or strain to begin urination ?: Less than half the time (04/04/23 0859)  Nocturia  Over the past month, how many times did you most typically get up to urinate from the time you went to bed until the time you got up in the morning ?: Less than 1 time in 5 (04/04/23 0859)  Quality of life due to urinary symptoms  If you were to spend the rest of your life with your urinary condition the way it is now, how would feel about that?: Mixed - about equally satisfied (04/04/23 0859)    Scores  Total IPSS Score: 14 (04/04/23 0859)  Total Score = Symtomatic Level: moderately symptomatic: 8-19 (04/04/23 0859)     KUB independent review    A KUB is available for me to review today.  The image is inspected for a bowel gas pattern and the general bone structure of the spine and pelvis. The kidneys are then inspected closely.  Renal outline is noted if identifiable. The kidney,  collecting system, and anticipated path of the ureter are examined for calcifications including those in the true pelvis.  This film reveals:    On the right there are no calcificaitons seen in the kidney or the expected course of the ureter. .    On the left there is a single renal stone measuring 5 mm.      Assessment and Plan    Diagnoses and all orders for this visit:    1. Kidney stone on left side (Primary)  -     POC Urinalysis Dipstick, Multipro  -     XR Abdomen KUB; Future    Stone the left lower pole actually appears smaller than previous KUB it is in the lower aspect of the kidney no other obvious stones are seen he has had no stone episodes since he was last seen I recommend 6-month follow-up with KUB to follow this radiographically at this point.             Yes

## 2023-04-12 ENCOUNTER — RX RENEWAL (OUTPATIENT)
Age: 83
End: 2023-04-12

## 2023-04-12 RX ORDER — UMECLIDINIUM BROMIDE AND VILANTEROL TRIFENATATE 62.5; 25 UG/1; UG/1
62.5-25 POWDER RESPIRATORY (INHALATION)
Qty: 60 | Refills: 2 | Status: ACTIVE | COMMUNITY
Start: 2021-03-30 | End: 1900-01-01

## 2023-04-13 ENCOUNTER — OUTPATIENT (OUTPATIENT)
Dept: OUTPATIENT SERVICES | Facility: HOSPITAL | Age: 83
LOS: 1 days | Discharge: HOME | End: 2023-04-13

## 2023-04-13 DIAGNOSIS — Z90.710 ACQUIRED ABSENCE OF BOTH CERVIX AND UTERUS: Chronic | ICD-10-CM

## 2023-04-13 DIAGNOSIS — D89.0 POLYCLONAL HYPERGAMMAGLOBULINEMIA: ICD-10-CM

## 2023-04-20 ENCOUNTER — RESULT REVIEW (OUTPATIENT)
Age: 83
End: 2023-04-20

## 2023-04-20 ENCOUNTER — APPOINTMENT (OUTPATIENT)
Dept: HEMATOLOGY ONCOLOGY | Facility: CLINIC | Age: 83
End: 2023-04-20
Payer: MEDICARE

## 2023-04-20 ENCOUNTER — APPOINTMENT (OUTPATIENT)
Dept: HEMATOLOGY ONCOLOGY | Facility: CLINIC | Age: 83
End: 2023-04-20

## 2023-04-20 VITALS
HEART RATE: 78 BPM | WEIGHT: 175 LBS | BODY MASS INDEX: 35.28 KG/M2 | DIASTOLIC BLOOD PRESSURE: 78 MMHG | RESPIRATION RATE: 16 BRPM | TEMPERATURE: 98 F | OXYGEN SATURATION: 98 % | SYSTOLIC BLOOD PRESSURE: 122 MMHG | HEIGHT: 59 IN

## 2023-04-20 DIAGNOSIS — D89.0 POLYCLONAL HYPERGAMMAGLOBULINEMIA: ICD-10-CM

## 2023-04-20 DIAGNOSIS — E85.9 AMYLOIDOSIS, UNSPECIFIED: ICD-10-CM

## 2023-04-20 LAB
ALBUMIN SERPL ELPH-MCNC: 4.3 G/DL
ALP BLD-CCNC: 78 U/L
ALT SERPL-CCNC: 7 U/L
ANION GAP SERPL CALC-SCNC: 11 MMOL/L
AST SERPL-CCNC: 19 U/L
BASOPHILS # BLD AUTO: 0.03 K/UL — SIGNIFICANT CHANGE UP (ref 0–0.2)
BASOPHILS NFR BLD AUTO: 0.6 % — SIGNIFICANT CHANGE UP (ref 0–2)
BILIRUB SERPL-MCNC: 0.2 MG/DL
BUN SERPL-MCNC: 21 MG/DL
CALCIUM SERPL-MCNC: 9.8 MG/DL
CHLORIDE SERPL-SCNC: 103 MMOL/L
CO2 SERPL-SCNC: 28 MMOL/L
CREAT SERPL-MCNC: 0.83 MG/DL
EGFR: 70 ML/MIN/1.73M2
EOSINOPHIL # BLD AUTO: 0.21 K/UL — SIGNIFICANT CHANGE UP (ref 0–0.5)
EOSINOPHIL NFR BLD AUTO: 4.1 % — SIGNIFICANT CHANGE UP (ref 0–6)
GLUCOSE SERPL-MCNC: 86 MG/DL
HCT VFR BLD CALC: 34.5 % — SIGNIFICANT CHANGE UP (ref 34.5–45)
HGB BLD-MCNC: 11.7 G/DL — SIGNIFICANT CHANGE UP (ref 11.5–15.5)
IMM GRANULOCYTES NFR BLD AUTO: 0.6 % — SIGNIFICANT CHANGE UP (ref 0–0.9)
LYMPHOCYTES # BLD AUTO: 1.05 K/UL — SIGNIFICANT CHANGE UP (ref 1–3.3)
LYMPHOCYTES # BLD AUTO: 20.6 % — SIGNIFICANT CHANGE UP (ref 13–44)
MCHC RBC-ENTMCNC: 27.5 PG — SIGNIFICANT CHANGE UP (ref 27–34)
MCHC RBC-ENTMCNC: 33.9 G/DL — SIGNIFICANT CHANGE UP (ref 32–36)
MCV RBC AUTO: 81 FL — SIGNIFICANT CHANGE UP (ref 80–100)
MONOCYTES # BLD AUTO: 0.42 K/UL — SIGNIFICANT CHANGE UP (ref 0–0.9)
MONOCYTES NFR BLD AUTO: 8.2 % — SIGNIFICANT CHANGE UP (ref 2–14)
NEUTROPHILS # BLD AUTO: 3.36 K/UL — SIGNIFICANT CHANGE UP (ref 1.8–7.4)
NEUTROPHILS NFR BLD AUTO: 65.9 % — SIGNIFICANT CHANGE UP (ref 43–77)
NRBC # BLD: 0 /100 WBCS — SIGNIFICANT CHANGE UP (ref 0–0)
PLATELET # BLD AUTO: 265 K/UL — SIGNIFICANT CHANGE UP (ref 150–400)
POTASSIUM SERPL-SCNC: 4.6 MMOL/L
PROT SERPL-MCNC: 7.4 G/DL
RBC # BLD: 4.26 M/UL — SIGNIFICANT CHANGE UP (ref 3.8–5.2)
RBC # FLD: 15.5 % — HIGH (ref 10.3–14.5)
SODIUM SERPL-SCNC: 141 MMOL/L
WBC # BLD: 5.1 K/UL — SIGNIFICANT CHANGE UP (ref 3.8–10.5)
WBC # FLD AUTO: 5.1 K/UL — SIGNIFICANT CHANGE UP (ref 3.8–10.5)

## 2023-04-20 PROCEDURE — 99214 OFFICE O/P EST MOD 30 MIN: CPT

## 2023-04-20 NOTE — HISTORY OF PRESENT ILLNESS
[de-identified] : 82yo F w/ afib on Xarelto, HTN, lymphedema referred for gammopathy, r/o amyloidosis. \par \par She is seeing Dr. Jamie Owens cardiology. Pt notes having dyspnea with minimal exertion. She has severe edema of LE from lymphedema for which she is on lasix 60mg BID. She had echo 1/27/21: normal LV function and wall motion. Moderate LAE, mild RV enlargement, severe RA enlargement, mild-moderate MR and moderate to severe TR. PASP approx 52 c/w moderate pulmonary HTN\par She had a CT chest 2/12/21 which showed cardiomegaly, mostly affecting the right chambers consistent with pulmonary HTN. 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 She is here for evaluation for amyloid. She is awaiting technetium pyrophosphate scan. \par \par She received both COVID vaccine doses [de-identified] : She did not get fat pad done. Labs from 12/30/21 showed Hgb 9.6. Hgb from 1/11/22 was 9.3. HbEP showed HbC trait. Iron studies were normal - Fe 55, TIBC 308, TSAT 18%, ferritin 48. B12 514, folate 11.8. It improved to 11.2 on visit in 2/2022. \par \par She was diagnosed with amyloid based on ? genetic testing from cardiologist Dr. Jamie Ware. \par I called Dr. Ware with pt and daughter. Pt has an elevated kappa/lambda ratio. Fax sent to us showed SFLC ratio of 2.16 (42.6/19.7) from 1/2021. 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 \par \par 7/2021 technetium pyrophosphate scan: Cardiac amyloid Imaging study is  not suggestive of transthyretin cardiac amyloidosis.\par \par 4/20/23: Patient is here with her daughter. She reports to be doing well. She states that she is recovering well post fat pad biopsy. 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

## 2023-04-20 NOTE — ASSESSMENT
[FreeTextEntry1] : 81yo F w/ afib on Xarelto, HTN, lymphedema referred back for amyloidosis\par Pt was previously seen for anemia. She has Hb C trait. She likely also has an underlying thalassemia given her microcytosis. Iron studies wnl. Her Hgb is now wnl\par I spoke with Dr. Ware today with pt and daughter present. Pt has an elevated kappa/lambda ratio. Fax sent to us showed SFLC ratio of 2.16 (42.6/19.7) from 1/2021. 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 \par 7/2021 technetium pyrophosphate scan: Cardiac amyloid Imaging study is  not suggestive of transthyretin cardiac amyloidosis.Following with Dr Ware (cardiologist) \par She does not appear that she has AL amyloid. MM labs stablewith normal free lambda. Fat pad biospy showing amyloid deposition in the walls of vascular structures with congo red stain being positive. However, this is not AL amyloidosis, but ATTR per testing at [facility]. ATTR is not a hematological amyloid protein. Refer patient the Dr. Ángel Mares for consult. \par RTC 6 mo \par

## 2023-04-23 LAB
ALBUMIN MFR SERPL ELPH: 53.9 %
ALBUMIN SERPL-MCNC: 4 G/DL
ALBUMIN/GLOB SERPL: 1.2 RATIO
ALPHA1 GLOB MFR SERPL ELPH: 5 %
ALPHA1 GLOB SERPL ELPH-MCNC: 0.4 G/DL
ALPHA2 GLOB MFR SERPL ELPH: 11.2 %
ALPHA2 GLOB SERPL ELPH-MCNC: 0.8 G/DL
B-GLOBULIN MFR SERPL ELPH: 10.1 %
B-GLOBULIN SERPL ELPH-MCNC: 0.7 G/DL
DEPRECATED KAPPA LC FREE/LAMBDA SER: 2.04 RATIO
GAMMA GLOB FLD ELPH-MCNC: 1.5 G/DL
GAMMA GLOB MFR SERPL ELPH: 19.8 %
IGA SER QL IEP: 224 MG/DL
IGG SER QL IEP: 1804 MG/DL
IGM SER QL IEP: 63 MG/DL
INTERPRETATION SERPL IEP-IMP: NORMAL
KAPPA LC CSF-MCNC: 1.85 MG/DL
KAPPA LC SERPL-MCNC: 3.78 MG/DL
M PROTEIN SPEC IFE-MCNC: NORMAL
PROT SERPL-MCNC: 7.4 G/DL
PROT SERPL-MCNC: 7.4 G/DL

## 2023-07-12 NOTE — REVIEW OF SYSTEMS
Detail Level: Zone Initiate Treatment: Terbinafine 250mg QD for two weeks\\nKetoconazole 2% bid for two weeks [Lower Ext Edema] : lower extremity edema [SOB on Exertion] : shortness of breath during exertion [Negative] : Allergic/Immunologic

## 2023-12-11 ENCOUNTER — APPOINTMENT (OUTPATIENT)
Dept: NEUROLOGY | Facility: CLINIC | Age: 83
End: 2023-12-11

## 2024-06-13 NOTE — ED PROVIDER NOTE - PSYCHIATRIC, MLM
.
Alert and oriented to person, place, time/situation. normal mood and affect. no apparent risk to self or others.
